# Patient Record
Sex: FEMALE | Race: WHITE | NOT HISPANIC OR LATINO | Employment: FULL TIME | URBAN - METROPOLITAN AREA
[De-identification: names, ages, dates, MRNs, and addresses within clinical notes are randomized per-mention and may not be internally consistent; named-entity substitution may affect disease eponyms.]

---

## 2018-06-06 ENCOUNTER — APPOINTMENT (EMERGENCY)
Dept: RADIOLOGY | Facility: HOSPITAL | Age: 63
End: 2018-06-06
Payer: COMMERCIAL

## 2018-06-06 ENCOUNTER — HOSPITAL ENCOUNTER (EMERGENCY)
Facility: HOSPITAL | Age: 63
Discharge: HOME | End: 2018-06-06
Attending: EMERGENCY MEDICINE | Admitting: EMERGENCY MEDICINE
Payer: COMMERCIAL

## 2018-06-06 VITALS
DIASTOLIC BLOOD PRESSURE: 65 MMHG | OXYGEN SATURATION: 97 % | SYSTOLIC BLOOD PRESSURE: 155 MMHG | RESPIRATION RATE: 16 BRPM | TEMPERATURE: 97.9 F | HEART RATE: 66 BPM

## 2018-06-06 DIAGNOSIS — S80.211A ABRASION OF RIGHT KNEE: ICD-10-CM

## 2018-06-06 DIAGNOSIS — M25.522 LEFT ELBOW PAIN: Primary | ICD-10-CM

## 2018-06-06 DIAGNOSIS — S60.511A: ICD-10-CM

## 2018-06-06 PROCEDURE — 73080 X-RAY EXAM OF ELBOW: CPT

## 2018-06-06 PROCEDURE — 99283 EMERGENCY DEPT VISIT LOW MDM: CPT

## 2018-06-06 PROCEDURE — 73090 X-RAY EXAM OF FOREARM: CPT

## 2018-06-06 RX ORDER — SIMVASTATIN 20 MG
20 TABLET ORAL
COMMUNITY

## 2018-06-06 RX ORDER — HYDROCHLOROTHIAZIDE 12.5 MG/1
12.5 TABLET ORAL DAILY
COMMUNITY

## 2018-06-06 RX ORDER — LISINOPRIL 20 MG/1
20 TABLET ORAL DAILY
COMMUNITY

## 2018-06-06 NOTE — ED PROVIDER NOTES
History  Chief Complaint   Patient presents with    Fall     pt fell on Saturday slipped on flip flops  pt noted bruise to left arm  no swelling  no thinners does take baby asa and fish oil  able to move arm      Pt was wearing flip flops and tripped  Broke fall with both hands  Scraped right hand and right knee  Having pain in left elbow since  Noticed a large bruise to her left elbow and was encouraged by coworkers to be evaluated  Pt has full ROM of left elbow  Pt believes last Tetanus shot was in past 5 years  History provided by:  Patient   used: No    Arm Pain   Location:  Left elbow/forearm  Duration:  4 days  Timing:  Constant  Progression:  Unchanged  Chronicity:  New  Worsened by: Movement  Associated symptoms: no abdominal pain, no chest pain, no headaches, no nausea, no shortness of breath and no vomiting        Prior to Admission Medications   Prescriptions Last Dose Informant Patient Reported? Taking? ASPIRIN 81 PO   Yes Yes   Sig: Take by mouth daily   Omega-3 Fatty Acids (FISH OIL PO)   Yes Yes   Sig: Take by mouth daily   hydrochlorothiazide (HYDRODIURIL) 12 5 mg tablet   Yes Yes   Sig: Take 12 5 mg by mouth daily   lisinopril (ZESTRIL) 20 mg tablet   Yes Yes   Sig: Take 20 mg by mouth daily   simvastatin (ZOCOR) 20 mg tablet   Yes Yes   Sig: Take 20 mg by mouth daily at bedtime      Facility-Administered Medications: None       Past Medical History:   Diagnosis Date    Hypertension        Past Surgical History:   Procedure Laterality Date    HYSTERECTOMY         History reviewed  No pertinent family history  I have reviewed and agree with the history as documented  Social History   Substance Use Topics    Smoking status: Never Smoker    Smokeless tobacco: Never Used    Alcohol use Yes      Comment: social         Review of Systems   HENT: Negative for ear discharge and facial swelling  Eyes: Negative for photophobia and pain     Respiratory: Negative for chest tightness, shortness of breath and stridor  Cardiovascular: Negative for chest pain  Gastrointestinal: Negative for abdominal distention, abdominal pain, nausea and vomiting  Musculoskeletal: Negative for back pain, joint swelling and neck pain  Left elbow/forearm     Skin: Positive for wound (Abrasions to right knee/hand)  Neurological: Negative for dizziness, facial asymmetry, speech difficulty, weakness, light-headedness, numbness and headaches  All other systems reviewed and are negative  Physical Exam  Physical Exam   Constitutional: She appears well-developed and well-nourished  No distress  HENT:   Head: Normocephalic and atraumatic  Eyes: No scleral icterus  Neck: No JVD present  Cardiovascular: Intact distal pulses  Pulmonary/Chest: Effort normal    Musculoskeletal: She exhibits no edema or deformity  Right elbow: Normal        Left elbow: She exhibits normal range of motion, no swelling, no effusion, no deformity and no laceration  Tenderness found  Lateral epicondyle tenderness noted  Right wrist: Normal         Left wrist: Normal         Right knee: Normal    Neurological: She is alert  She has normal strength  No cranial nerve deficit  Gait normal    Skin: Skin is warm and dry  Abrasion (right hand, right knee) and ecchymosis (Left elbow) noted  No rash noted  She is not diaphoretic  No erythema  No pallor  Psychiatric: She has a normal mood and affect  Pleasant   Nursing note and vitals reviewed        Vital Signs  ED Triage Vitals [06/06/18 1640]   Temperature Pulse Respirations Blood Pressure SpO2   97 9 °F (36 6 °C) 66 16 (!) 186/82 97 %      Temp Source Heart Rate Source Patient Position - Orthostatic VS BP Location FiO2 (%)   Oral Monitor Sitting Right arm --      Pain Score       No Pain           Vitals:    06/06/18 1640 06/06/18 1706   BP: (!) 186/82 155/65   Pulse: 66    Patient Position - Orthostatic VS: Sitting Sitting       Visual Acuity      ED Medications  Medications - No data to display    Diagnostic Studies  Results Reviewed     None                 XR forearm 2 views LEFT   ED Interpretation by Yoanna Lockett DO (06/06 1721)   No fracture      Final Result by Madiha Malhotra MD (06/06 1732)      No acute osseous abnormality  Workstation performed: [id number]         XR elbow 3+ vw LEFT   ED Interpretation by Yoanna Lockett DO (06/06 1725)   No fracture      Final Result by Madiha Malhotra MD (06/06 1731)      No acute osseous abnormality  Workstation performed: [id number]                    Procedures  Procedures       Phone Contacts  ED Phone Contact    ED Course                               MDM  Number of Diagnoses or Management Options  Diagnosis management comments: s/p mechanical fall - Will check xrays to r/o fx, pt reports she is UTD w/ Tetanus  Amount and/or Complexity of Data Reviewed  Tests in the radiology section of CPT®: reviewed and ordered      CritCare Time    Disposition  Final diagnoses:   Left elbow pain   Abrasion of right knee   Abrasion of palm of right hand     Time reflects when diagnosis was documented in both MDM as applicable and the Disposition within this note     Time User Action Codes Description Comment    6/6/2018  5:25 PM Laurie Taylor 46 [M25 522] Left elbow pain     6/6/2018  5:26 PM Brandon, [address] Abrasion of right knee     6/6/2018  5:26 PM Brandon, [address] Abrasion of palm of right hand       ED Disposition     ED Disposition Condition Comment    Discharge  Mariel Hines discharge to home/self care      Condition at discharge: Good        Follow-up Information     Follow up With Specialties Details Why Contact Info    Shantel Womack MD Orthopedic Surgery Schedule an appointment as soon as possible for a visit If symptoms persist [address] [place] [address]  [phone]            Patient's Medications Discharge Prescriptions    No medications on file     No discharge procedures on file      ED Provider  Electronically Signed by           Yoanna Luong 22, DO  06/06/18 7493

## 2018-06-06 NOTE — DISCHARGE INSTRUCTIONS
Abrasion   WHAT YOU NEED TO KNOW:   An abrasion is a scrape on your skin  It happens when your skin rubs against a rough surface  Some examples of an abrasion include rug burn, a skinned elbow, or road rash  Abrasions can be many shapes and sizes  The wound may hurt, bleed, bruise, or swell  DISCHARGE INSTRUCTIONS:   Return to the emergency department if:   · The bleeding does not stop after 10 minutes of firm pressure  · You cannot rinse one or more foreign objects out of your wound  · You have red streaks on your skin coming from your wound  Contact your healthcare provider if:   · You have a fever or chills  · Your abrasion is red, warm, swollen, or draining pus  · You have questions or concerns about your condition or care  Care for your abrasion:   · Wash your hands and dry them with a clean towel  · Press a clean cloth against your wound to stop any bleeding  · Rinse your wound with a lot of clean water  Do not use harsh soap, alcohol, or iodine solutions  · Use a clean, wet cloth to remove any objects, such as small pieces of rocks or dirt  · Rub antibiotic ointment on your wound  This may help prevent infection and help your wound heal     · Cover the wound with a non-stick bandage  Change the bandage daily, and if gets wet or dirty  Follow up with your healthcare provider as directed:  Write down your questions so you remember to ask them during your visits  © 2017 [address] [employer] Information is for End User's use only and may not be sold, redistributed or otherwise used for commercial purposes  All illustrations and images included in CareNotes® are the copyrighted property of [employer]  or Tahir Spivey  The above information is an  only  It is not intended as medical advice for individual conditions or treatments   Talk to your doctor, nurse or pharmacist before following any medical regimen to see if it is safe and effective for you  Elbow Sprain   WHAT YOU NEED TO KNOW:   An elbow sprain is caused by a stretched or torn ligament in the elbow joint  Ligaments are the strong tissues that connect bones  DISCHARGE INSTRUCTIONS:   Return to the emergency department if:   · The skin of your injured arm looks bluish or pale (less color than normal)  · You have new or increased numbness in your injured arm  Contact your healthcare provider if:   · You have increased swelling and pain in your elbow  · You have new or increased stiffness or trouble moving your injured arm  · You have questions or concerns about your condition or care  Medicines:   · Prescription pain medicine  may be given  Ask how to take this medicine safely  · Take your medicine as directed  Contact your healthcare provider if you think your medicine is not helping or if you have side effects  Tell him or her if you are allergic to any medicine  Keep a list of the medicines, vitamins, and herbs you take  Include the amounts, and when and why you take them  Bring the list or the pill bottles to follow-up visits  Carry your medicine list with you in case of an emergency  Rest your elbow: You will need to rest your elbow for 1 to 2 days after your injury  This will help decrease the risk of more damage to your elbow  Ice your elbow:  Apply ice on your elbow for 15 to 20 minutes every hour or as directed  Use an ice pack, or put crushed ice in a plastic bag  Cover it with a towel  Ice helps prevent tissue damage and decreases swelling and pain  Compress your elbow:  Compression provides support and helps decrease swelling and movement so your elbow can heal  You may be told to keep your elbow wrapped with a tight elastic bandage  Follow instructions about how to apply your bandage  Elevate your elbow:  Elevate your elbow above the level of your heart as often as you can  This will help decrease swelling and pain   Prop your elbow on pillows or blankets to keep it elevated comfortably  Exercise your elbow: You should begin to exercise your arm in a few days, once you are able to move your elbow without pain  Exercises will help decrease stiffness and improve the strength of your arm  Ask your healthcare provider what kind of exercises you should do  Prevent another elbow sprain:   · Make sure you warm up and stretch before you exercise  · Do not exercise when you feel pain or you are tired  · Wear equipment to protect yourself when you play sports  · Stop exercising and playing sports if your symptoms from a past injury return  Follow up with your healthcare provider within 1 week:  Write down any questions you have so you remember to ask them in your follow-up visits  © 2017 [address] [facility] Information is for End User's use only and may not be sold, redistributed or otherwise used for commercial purposes  All illustrations and images included in CareNotes® are the copyrighted property of A D [employer] M [employer]  or Tahir Spivey  The above information is an  only  It is not intended as medical advice for individual conditions or treatments  Talk to your doctor, nurse or pharmacist before following any medical regimen to see if it is safe and effective for you

## 2018-10-23 ENCOUNTER — APPOINTMENT (OUTPATIENT)
Dept: LAB | Facility: MEDICAL CENTER | Age: 63
End: 2018-10-23
Payer: COMMERCIAL

## 2018-10-23 ENCOUNTER — TRANSCRIBE ORDERS (OUTPATIENT)
Dept: ADMINISTRATIVE | Facility: HOSPITAL | Age: 63
End: 2018-10-23

## 2018-10-23 DIAGNOSIS — I51.9 MYXEDEMA HEART DISEASE: ICD-10-CM

## 2018-10-23 DIAGNOSIS — E03.9 MYXEDEMA HEART DISEASE: Primary | ICD-10-CM

## 2018-10-23 DIAGNOSIS — Z51.81 ENCOUNTER FOR THERAPEUTIC DRUG MONITORING: ICD-10-CM

## 2018-10-23 DIAGNOSIS — I10 BENIGN HYPERTENSION: ICD-10-CM

## 2018-10-23 DIAGNOSIS — E03.9 MYXEDEMA HEART DISEASE: ICD-10-CM

## 2018-10-23 DIAGNOSIS — E78.5 HYPERLIPIDEMIA, UNSPECIFIED HYPERLIPIDEMIA TYPE: ICD-10-CM

## 2018-10-23 DIAGNOSIS — I51.9 MYXEDEMA HEART DISEASE: Primary | ICD-10-CM

## 2018-10-23 LAB
ALBUMIN SERPL BCP-MCNC: 3.8 G/DL (ref 3.5–5)
ALP SERPL-CCNC: 63 U/L (ref 46–116)
ALT SERPL W P-5'-P-CCNC: 31 U/L (ref 12–78)
ANION GAP SERPL CALCULATED.3IONS-SCNC: 5 MMOL/L (ref 4–13)
AST SERPL W P-5'-P-CCNC: 18 U/L (ref 5–45)
BILIRUB SERPL-MCNC: 0.47 MG/DL (ref 0.2–1)
BUN SERPL-MCNC: 16 MG/DL (ref 5–25)
CALCIUM SERPL-MCNC: 9.9 MG/DL (ref 8.3–10.1)
CHLORIDE SERPL-SCNC: 104 MMOL/L (ref 100–108)
CHOLEST SERPL-MCNC: 168 MG/DL (ref 50–200)
CO2 SERPL-SCNC: 28 MMOL/L (ref 21–32)
CREAT SERPL-MCNC: 0.68 MG/DL (ref 0.6–1.3)
GFR SERPL CREATININE-BSD FRML MDRD: 92 ML/MIN/1.73SQ M
GLUCOSE P FAST SERPL-MCNC: 84 MG/DL (ref 65–99)
HDLC SERPL-MCNC: 46 MG/DL (ref 40–60)
LDLC SERPL CALC-MCNC: 85 MG/DL (ref 0–100)
NONHDLC SERPL-MCNC: 122 MG/DL
POTASSIUM SERPL-SCNC: 4.4 MMOL/L (ref 3.5–5.3)
PROT SERPL-MCNC: 7.2 G/DL (ref 6.4–8.2)
SODIUM SERPL-SCNC: 137 MMOL/L (ref 136–145)
TRIGL SERPL-MCNC: 185 MG/DL
TSH SERPL DL<=0.05 MIU/L-ACNC: 4.95 UIU/ML (ref 0.36–3.74)

## 2018-10-23 PROCEDURE — 80053 COMPREHEN METABOLIC PANEL: CPT

## 2018-10-23 PROCEDURE — 36415 COLL VENOUS BLD VENIPUNCTURE: CPT

## 2018-10-23 PROCEDURE — 80061 LIPID PANEL: CPT

## 2018-10-23 PROCEDURE — 84443 ASSAY THYROID STIM HORMONE: CPT

## 2019-02-15 ENCOUNTER — APPOINTMENT (OUTPATIENT)
Dept: LAB | Facility: MEDICAL CENTER | Age: 64
End: 2019-02-15
Payer: COMMERCIAL

## 2019-02-15 ENCOUNTER — TRANSCRIBE ORDERS (OUTPATIENT)
Dept: ADMINISTRATIVE | Facility: HOSPITAL | Age: 64
End: 2019-02-15

## 2019-02-15 DIAGNOSIS — Z51.81 ENCOUNTER FOR THERAPEUTIC DRUG MONITORING: ICD-10-CM

## 2019-02-15 DIAGNOSIS — E03.9 HYPOTHYROIDISM (ACQUIRED): ICD-10-CM

## 2019-02-15 DIAGNOSIS — E03.9 HYPOTHYROIDISM (ACQUIRED): Primary | ICD-10-CM

## 2019-02-15 LAB — TSH SERPL DL<=0.05 MIU/L-ACNC: 3.35 UIU/ML (ref 0.36–3.74)

## 2019-02-15 PROCEDURE — 36415 COLL VENOUS BLD VENIPUNCTURE: CPT

## 2019-02-15 PROCEDURE — 84443 ASSAY THYROID STIM HORMONE: CPT

## 2019-04-29 ENCOUNTER — APPOINTMENT (OUTPATIENT)
Dept: LAB | Facility: MEDICAL CENTER | Age: 64
End: 2019-04-29
Payer: COMMERCIAL

## 2019-04-29 ENCOUNTER — TRANSCRIBE ORDERS (OUTPATIENT)
Dept: ADMINISTRATIVE | Facility: HOSPITAL | Age: 64
End: 2019-04-29

## 2019-04-29 DIAGNOSIS — Z51.81 ENCOUNTER FOR MEDICATION MONITORING: ICD-10-CM

## 2019-04-29 DIAGNOSIS — E03.9 HYPOTHYROIDISM, ADULT: ICD-10-CM

## 2019-04-29 DIAGNOSIS — E03.9 HYPOTHYROIDISM, ADULT: Primary | ICD-10-CM

## 2019-04-29 LAB — TSH SERPL DL<=0.05 MIU/L-ACNC: 3.71 UIU/ML (ref 0.36–3.74)

## 2019-04-29 PROCEDURE — 84443 ASSAY THYROID STIM HORMONE: CPT

## 2019-04-29 PROCEDURE — 36415 COLL VENOUS BLD VENIPUNCTURE: CPT

## 2019-07-13 ENCOUNTER — HOSPITAL ENCOUNTER (EMERGENCY)
Facility: HOSPITAL | Age: 64
Discharge: HOME | End: 2019-07-13
Attending: EMERGENCY MEDICINE
Payer: COMMERCIAL

## 2019-07-13 ENCOUNTER — APPOINTMENT (EMERGENCY)
Dept: RADIOLOGY | Facility: HOSPITAL | Age: 64
End: 2019-07-13
Payer: COMMERCIAL

## 2019-07-13 ENCOUNTER — APPOINTMENT (EMERGENCY)
Dept: CT IMAGING | Facility: HOSPITAL | Age: 64
End: 2019-07-13
Payer: COMMERCIAL

## 2019-07-13 VITALS
DIASTOLIC BLOOD PRESSURE: 60 MMHG | WEIGHT: 203.93 LBS | SYSTOLIC BLOOD PRESSURE: 126 MMHG | RESPIRATION RATE: 16 BRPM | HEART RATE: 93 BPM | OXYGEN SATURATION: 100 % | TEMPERATURE: 98.1 F

## 2019-07-13 DIAGNOSIS — K21.9 GERD (GASTROESOPHAGEAL REFLUX DISEASE): ICD-10-CM

## 2019-07-13 DIAGNOSIS — E03.9 HYPOTHYROIDISM, UNSPECIFIED TYPE: ICD-10-CM

## 2019-07-13 DIAGNOSIS — Z51.81 ENCOUNTER FOR THERAPEUTIC DRUG MONITORING: ICD-10-CM

## 2019-07-13 DIAGNOSIS — R10.9 ABDOMINAL PAIN: Primary | ICD-10-CM

## 2019-07-13 DIAGNOSIS — K44.9 HIATAL HERNIA: ICD-10-CM

## 2019-07-13 DIAGNOSIS — R51.9 FACIAL PAIN: ICD-10-CM

## 2019-07-13 DIAGNOSIS — J32.9 CHRONIC SINUSITIS, UNSPECIFIED LOCATION: ICD-10-CM

## 2019-07-13 DIAGNOSIS — R09.81 NASAL CONGESTION: ICD-10-CM

## 2019-07-13 DIAGNOSIS — R05.9 COUGH: ICD-10-CM

## 2019-07-13 LAB
ALBUMIN SERPL BCP-MCNC: 3.6 G/DL (ref 3.5–5)
ALP SERPL-CCNC: 163 U/L (ref 46–116)
ALT SERPL W P-5'-P-CCNC: 43 U/L (ref 12–78)
ANION GAP SERPL CALCULATED.3IONS-SCNC: 9 MMOL/L (ref 4–13)
AST SERPL W P-5'-P-CCNC: 26 U/L (ref 5–45)
BACTERIA UR QL AUTO: ABNORMAL /HPF
BASOPHILS # BLD AUTO: 0.04 THOUSANDS/ΜL (ref 0–0.1)
BASOPHILS NFR BLD AUTO: 0 % (ref 0–1)
BILIRUB SERPL-MCNC: 0.41 MG/DL (ref 0.2–1)
BILIRUB UR QL STRIP: NEGATIVE
BUN SERPL-MCNC: 19 MG/DL (ref 5–25)
CALCIUM SERPL-MCNC: 9.6 MG/DL (ref 8.3–10.1)
CHLORIDE SERPL-SCNC: 100 MMOL/L (ref 100–108)
CLARITY UR: CLEAR
CO2 SERPL-SCNC: 27 MMOL/L (ref 21–32)
COLOR UR: YELLOW
CREAT SERPL-MCNC: 1.05 MG/DL (ref 0.6–1.3)
EOSINOPHIL # BLD AUTO: 1.97 THOUSAND/ΜL (ref 0–0.61)
EOSINOPHIL NFR BLD AUTO: 17 % (ref 0–6)
ERYTHROCYTE [DISTWIDTH] IN BLOOD BY AUTOMATED COUNT: 12.4 % (ref 11.6–15.1)
GFR SERPL CREATININE-BSD FRML MDRD: 55 ML/MIN/1.73SQ M
GLUCOSE SERPL-MCNC: 119 MG/DL (ref 65–140)
GLUCOSE UR STRIP-MCNC: NEGATIVE MG/DL
HCT VFR BLD AUTO: 45.2 % (ref 34.8–46.1)
HGB BLD-MCNC: 14.9 G/DL (ref 11.5–15.4)
HGB UR QL STRIP.AUTO: NEGATIVE
IMM GRANULOCYTES # BLD AUTO: 0.06 THOUSAND/UL (ref 0–0.2)
IMM GRANULOCYTES NFR BLD AUTO: 1 % (ref 0–2)
KETONES UR STRIP-MCNC: NEGATIVE MG/DL
LEUKOCYTE ESTERASE UR QL STRIP: ABNORMAL
LIPASE SERPL-CCNC: 113 U/L (ref 73–393)
LYMPHOCYTES # BLD AUTO: 0.31 THOUSANDS/ΜL (ref 0.6–4.47)
LYMPHOCYTES NFR BLD AUTO: 3 % (ref 14–44)
MCH RBC QN AUTO: 30.7 PG (ref 26.8–34.3)
MCHC RBC AUTO-ENTMCNC: 33 G/DL (ref 31.4–37.4)
MCV RBC AUTO: 93 FL (ref 82–98)
MONOCYTES # BLD AUTO: 0.79 THOUSAND/ΜL (ref 0.17–1.22)
MONOCYTES NFR BLD AUTO: 7 % (ref 4–12)
NEUTROPHILS # BLD AUTO: 8.51 THOUSANDS/ΜL (ref 1.85–7.62)
NEUTS SEG NFR BLD AUTO: 72 % (ref 43–75)
NITRITE UR QL STRIP: NEGATIVE
NON-SQ EPI CELLS URNS QL MICRO: ABNORMAL /HPF
NRBC BLD AUTO-RTO: 0 /100 WBCS
PH UR STRIP.AUTO: 5.5 [PH] (ref 4.5–8)
PLATELET # BLD AUTO: 254 THOUSANDS/UL (ref 149–390)
PMV BLD AUTO: 9.6 FL (ref 8.9–12.7)
POTASSIUM SERPL-SCNC: 4.2 MMOL/L (ref 3.5–5.3)
PROT SERPL-MCNC: 8 G/DL (ref 6.4–8.2)
PROT UR STRIP-MCNC: NEGATIVE MG/DL
RBC # BLD AUTO: 4.85 MILLION/UL (ref 3.81–5.12)
RBC #/AREA URNS AUTO: ABNORMAL /HPF
SODIUM SERPL-SCNC: 136 MMOL/L (ref 136–145)
SP GR UR STRIP.AUTO: <=1.005 (ref 1–1.03)
TROPONIN I SERPL-MCNC: <0.02 NG/ML
UROBILINOGEN UR QL STRIP.AUTO: 0.2 E.U./DL
WBC # BLD AUTO: 11.68 THOUSAND/UL (ref 4.31–10.16)
WBC #/AREA URNS AUTO: ABNORMAL /HPF

## 2019-07-13 PROCEDURE — 99284 EMERGENCY DEPT VISIT MOD MDM: CPT | Performed by: PHYSICIAN ASSISTANT

## 2019-07-13 PROCEDURE — 80053 COMPREHEN METABOLIC PANEL: CPT | Performed by: PHYSICIAN ASSISTANT

## 2019-07-13 PROCEDURE — 36415 COLL VENOUS BLD VENIPUNCTURE: CPT | Performed by: PHYSICIAN ASSISTANT

## 2019-07-13 PROCEDURE — 84484 ASSAY OF TROPONIN QUANT: CPT | Performed by: PHYSICIAN ASSISTANT

## 2019-07-13 PROCEDURE — 74177 CT ABD & PELVIS W/CONTRAST: CPT

## 2019-07-13 PROCEDURE — 83690 ASSAY OF LIPASE: CPT | Performed by: PHYSICIAN ASSISTANT

## 2019-07-13 PROCEDURE — 96361 HYDRATE IV INFUSION ADD-ON: CPT

## 2019-07-13 PROCEDURE — 71046 X-RAY EXAM CHEST 2 VIEWS: CPT

## 2019-07-13 PROCEDURE — 81001 URINALYSIS AUTO W/SCOPE: CPT

## 2019-07-13 PROCEDURE — 96360 HYDRATION IV INFUSION INIT: CPT

## 2019-07-13 PROCEDURE — 85025 COMPLETE CBC W/AUTO DIFF WBC: CPT | Performed by: PHYSICIAN ASSISTANT

## 2019-07-13 PROCEDURE — 99285 EMERGENCY DEPT VISIT HI MDM: CPT

## 2019-07-13 PROCEDURE — 93005 ELECTROCARDIOGRAM TRACING: CPT

## 2019-07-13 RX ORDER — OMEPRAZOLE 40 MG/1
40 CAPSULE, DELAYED RELEASE ORAL DAILY
Qty: 20 CAPSULE | Refills: 0 | Status: SHIPPED | OUTPATIENT
Start: 2019-07-13

## 2019-07-13 RX ORDER — LEVOTHYROXINE SODIUM 0.03 MG/1
25 TABLET ORAL DAILY
COMMUNITY

## 2019-07-13 RX ORDER — ONDANSETRON 4 MG/1
4 TABLET, ORALLY DISINTEGRATING ORAL EVERY 8 HOURS PRN
Qty: 10 TABLET | Refills: 0 | Status: SHIPPED | OUTPATIENT
Start: 2019-07-13 | End: 2019-09-25

## 2019-07-13 RX ORDER — ONDANSETRON 2 MG/ML
4 INJECTION INTRAMUSCULAR; INTRAVENOUS ONCE
Status: DISCONTINUED | OUTPATIENT
Start: 2019-07-13 | End: 2019-07-13 | Stop reason: HOSPADM

## 2019-07-13 RX ADMIN — SODIUM CHLORIDE 1000 ML: 0.9 INJECTION, SOLUTION INTRAVENOUS at 15:17

## 2019-07-13 RX ADMIN — IOHEXOL 100 ML: 350 INJECTION, SOLUTION INTRAVENOUS at 15:55

## 2019-07-13 NOTE — ED PROVIDER NOTES
History  Chief Complaint   Patient presents with    Abdominal Pain     abdomen pain and vomiting     75 y o  Female who presents for evaluation of N/V and waking up sweating since approximately 03:00, notes occasional wheeze  vague generalized abdominal discomfort worse LUQ  Notes similar episode approximately 1 week ago self limitng  Pt notes mild nausea at presents, abdominal discomfort;  Denies fevers, chills, SOB, CP, HA, blurry vision, no known sick contacts  Notes she has had post nasal discharge, congestion and nasal sounding voice x several weeks was treated with levaquin for sinusitis several weeks ago  + former smoker, + maternal CAD history  Prior to Admission Medications   Prescriptions Last Dose Informant Patient Reported? Taking? ASPIRIN 81 PO   Yes Yes   Sig: Take by mouth daily   Omega-3 Fatty Acids (FISH OIL PO)   Yes Yes   Sig: Take by mouth daily   hydrochlorothiazide (HYDRODIURIL) 12 5 mg tablet   Yes Yes   Sig: Take 12 5 mg by mouth daily   levothyroxine 25 mcg tablet   Yes Yes   Sig: Take 25 mcg by mouth daily   lisinopril (ZESTRIL) 20 mg tablet   Yes Yes   Sig: Take 20 mg by mouth daily   simvastatin (ZOCOR) 20 mg tablet   Yes Yes   Sig: Take 20 mg by mouth daily at bedtime      Facility-Administered Medications: None       Past Medical History:   Diagnosis Date    Hypertension        Past Surgical History:   Procedure Laterality Date    HYSTERECTOMY         History reviewed  No pertinent family history  I have reviewed and agree with the history as documented  Social History     Tobacco Use    Smoking status: Former Smoker    Smokeless tobacco: Never Used   Substance Use Topics    Alcohol use: Yes     Comment: social     Drug use: No        Review of Systems   Constitutional: Positive for diaphoresis  HENT: Positive for postnasal drip and rhinorrhea  Respiratory: Positive for wheezing  Gastrointestinal: Positive for nausea and vomiting     All other systems reviewed and are negative  Physical Exam  Physical Exam   Constitutional: She is oriented to person, place, and time  She appears well-developed  Non-toxic appearance  She does not appear ill  Eyes: Pupils are equal, round, and reactive to light  EOM are normal    Cardiovascular: Normal rate and normal heart sounds  Pulmonary/Chest: Effort normal and breath sounds normal    Abdominal: Soft  Normal appearance and bowel sounds are normal  There is tenderness in the left upper quadrant  There is no rigidity, no rebound, no guarding, no CVA tenderness, no tenderness at McBurney's point and negative Saldaña's sign  Neurological: She is alert and oriented to person, place, and time  Skin: Skin is warm and dry  Capillary refill takes less than 2 seconds  Psychiatric: She has a normal mood and affect  Her behavior is normal    Nursing note and vitals reviewed        Vital Signs  ED Triage Vitals   Temperature Pulse Respirations Blood Pressure SpO2   07/13/19 1418 07/13/19 1418 07/13/19 1418 07/13/19 1418 07/13/19 1418   98 1 °F (36 7 °C) (!) 108 16 151/75 98 %      Temp Source Heart Rate Source Patient Position - Orthostatic VS BP Location FiO2 (%)   07/13/19 1418 07/13/19 1418 07/13/19 1418 07/13/19 1418 --   Temporal Monitor Sitting Right arm       Pain Score       07/13/19 1501       No Pain           Vitals:    07/13/19 1418 07/13/19 1501   BP: 151/75 122/57   Pulse: (!) 108 90   Patient Position - Orthostatic VS: Sitting Sitting         Visual Acuity      ED Medications  Medications   sodium chloride 0 9 % bolus 1,000 mL (1,000 mL Intravenous New Bag 7/13/19 1517)   ondansetron (ZOFRAN) injection 4 mg (4 mg Intravenous Not Given 7/13/19 1520)   iohexol (OMNIPAQUE) 350 MG/ML injection (MULTI-DOSE) 100 mL (100 mL Intravenous Given 7/13/19 1555)       Diagnostic Studies  Results Reviewed     Procedure Component Value Units Date/Time    Comprehensive metabolic panel [[id number]]  (Abnormal) Collected:  07/13/19 1517    Lab Status:  Final result Specimen:  Blood from Arm, Right Updated:  07/13/19 1544     Sodium 136 mmol/L      Potassium 4 2 mmol/L      Chloride 100 mmol/L      CO2 27 mmol/L      ANION GAP 9 mmol/L      BUN 19 mg/dL      Creatinine 1 05 mg/dL      Glucose 119 mg/dL      Calcium 9 6 mg/dL      AST 26 U/L      ALT 43 U/L      Alkaline Phosphatase 163 U/L      Total Protein 8 0 g/dL      Albumin 3 6 g/dL      Total Bilirubin 0 41 mg/dL      eGFR 55 ml/min/1 73sq m     Narrative:       National Kidney Disease Foundation guidelines for Chronic Kidney Disease (CKD):     Stage 1 with normal or high GFR (GFR > 90 mL/min/1 73 square meters)    Stage 2 Mild CKD (GFR = 60-89 mL/min/1 73 square meters)    Stage 3A Moderate CKD (GFR = 45-59 mL/min/1 73 square meters)    Stage 3B Moderate CKD (GFR = 30-44 mL/min/1 73 square meters)    Stage 4 Severe CKD (GFR = 15-29 mL/min/1 73 square meters)    Stage 5 End Stage CKD (GFR <15 mL/min/1 73 square meters)  Note: GFR calculation is accurate only with a steady state creatinine    Lipase [[id number]]  (Normal) Collected:  07/13/19 1517    Lab Status:  Final result Specimen:  Blood from Arm, Right Updated:  07/13/19 1544     Lipase 113 u/L     Troponin I [[id number]]  (Normal) Collected:  07/13/19 1517    Lab Status:  Final result Specimen:  Blood from Arm, Right Updated:  07/13/19 1542     Troponin I <0 02 ng/mL     CBC and differential [[id number]]  (Abnormal) Collected:  07/13/19 1517    Lab Status:  Final result Specimen:  Blood from Arm, Right Updated:  07/13/19 1523     WBC 11 68 Thousand/uL      RBC 4 85 Million/uL      Hemoglobin 14 9 g/dL      Hematocrit 45 2 %      MCV 93 fL      MCH 30 7 pg      MCHC 33 0 g/dL      RDW 12 4 %      MPV 9 6 fL      Platelets 545 Thousands/uL      nRBC 0 /100 WBCs      Neutrophils Relative 72 %      Immat GRANS % 1 %      Lymphocytes Relative 3 %      Monocytes Relative 7 %      Eosinophils Relative 17 %      Basophils Relative 0 % Neutrophils Absolute 8 51 Thousands/µL      Immature Grans Absolute 0 06 Thousand/uL      Lymphocytes Absolute 0 31 Thousands/µL      Monocytes Absolute 0 79 Thousand/µL      Eosinophils Absolute 1 97 Thousand/µL      Basophils Absolute 0 04 Thousands/µL     POCT urinalysis dipstick [[id number]]     Lab Status:  No result Specimen:  Urine                  XR chest 2 views   Final Result by Buzz Ramon MD (07/13 4116)      No acute cardiopulmonary disease  Workstation performed: [id number]         CT abdomen pelvis with contrast    (Results Pending)              Procedures  Procedures       ED Course                               MDM  Number of Diagnoses or Management Options  Diagnosis management comments: Will check CBC, CMP, IVF, zofran, troponin, EKG, CXR and CT abdomen and pelvis  CXR no acute findings, CBC mild elevated WBCs, troponin negative, EKG no acute MI,   Pt was signed out to Dr Anuradha Perkins for further evaluation and management CT results pending      Disposition  Final diagnoses:   None     ED Disposition     None      Follow-up Information    None         Patient's Medications   Discharge Prescriptions    No medications on file     No discharge procedures on file      ED Provider  Electronically Signed by           Helene Orozco PA-C  07/13/19 2605

## 2019-07-13 NOTE — ED CARE HANDOFF
Emergency Department Sign Out Note        Sign out and transfer of care from [facility]  See Separate Emergency Department note  The patient, Galen Nieto, was evaluated by the previous provider for Abdominal pain  Workup Completed:  Labs, urine, CT A/P     ED Course / Workup Pending (followup):  Please see note below  ED Course as of Jul 13 1714   Sat Jul 13, 2019   1650 Patient received in sign out for follow up of CT results, no acute abnormality, incidental findings discussed with patient; advised follow up with PCP for further evaluation  We will give Omeprazole and Zofran for possible GERD symptoms  Procedures  MDM    Disposition  Final diagnoses:   Abdominal pain   GERD (gastroesophageal reflux disease)   Hiatal hernia     Time reflects when diagnosis was documented in both MDM as applicable and the Disposition within this note     Time User Action Codes Description Comment    7/13/2019  4:54 PM Nai Juarez [R10 9] Abdominal pain     7/13/2019  4:54 PM Nai Juarez [K21 9] GERD (gastroesophageal reflux disease)     7/13/2019  4:55 PM Nai Juarez [K44 9] Hiatal hernia       ED Disposition     ED Disposition Condition Date/Time Comment    Discharge Stable Sat Jul 13, 2019  4:54 PM Jesica Hines discharge to home/self care  Follow-up Information     Follow up With Specialties Details Why Contact Slim Vazquez MD Family Medicine   [address]  [phone]          Discharge Medication List as of 7/13/2019  4:56 PM      START taking these medications    Details   omeprazole (PriLOSEC) 40 MG capsule Take 1 capsule (40 mg total) by mouth daily, Starting Sat 7/13/2019, Print      ondansetron (ZOFRAN-ODT) 4 mg disintegrating tablet Take 1 tablet (4 mg total) by mouth every 8 (eight) hours as needed for nausea or vomiting for up to 5 days, Starting Sat 7/13/2019, Until u 7/18/2019, Print         CONTINUE these medications which have NOT CHANGED    Details   ASPIRIN 81 PO Take by mouth daily, Historical Med      hydrochlorothiazide (HYDRODIURIL) 12 5 mg tablet Take 12 5 mg by mouth daily, Historical Med      levothyroxine 25 mcg tablet Take 25 mcg by mouth daily, Historical Med      lisinopril (ZESTRIL) 20 mg tablet Take 20 mg by mouth daily, Historical Med      Omega-3 Fatty Acids (FISH OIL PO) Take by mouth daily, Historical Med      simvastatin (ZOCOR) 20 mg tablet Take 20 mg by mouth daily at bedtime, Historical Med           No discharge procedures on file         ED Provider  Electronically Signed by     Austin Kyle MD  07/13/19 7004

## 2019-07-13 NOTE — ED NOTES
Patient transported to [facility]  07/13/19 6944

## 2019-07-15 LAB
ATRIAL RATE: 85 BPM
P AXIS: -42 DEGREES
PR INTERVAL: 96 MS
QRS AXIS: 62 DEGREES
QRSD INTERVAL: 76 MS
QT INTERVAL: 338 MS
QTC INTERVAL: 402 MS
T WAVE AXIS: 33 DEGREES
VENTRICULAR RATE: 85 BPM

## 2019-07-15 PROCEDURE — 93010 ELECTROCARDIOGRAM REPORT: CPT | Performed by: INTERNAL MEDICINE

## 2019-07-22 ENCOUNTER — TRANSCRIBE ORDERS (OUTPATIENT)
Dept: ADMINISTRATIVE | Facility: HOSPITAL | Age: 64
End: 2019-07-22

## 2019-07-22 DIAGNOSIS — R51.9 FACIAL PAIN: Primary | ICD-10-CM

## 2019-07-22 DIAGNOSIS — E03.9 HYPOTHYROIDISM, UNSPECIFIED TYPE: ICD-10-CM

## 2019-07-22 DIAGNOSIS — Z51.81 ENCOUNTER FOR THERAPEUTIC DRUG MONITORING: ICD-10-CM

## 2019-07-22 DIAGNOSIS — J32.9 CHRONIC SINUSITIS, UNSPECIFIED LOCATION: ICD-10-CM

## 2019-07-22 DIAGNOSIS — R05.9 COUGH: ICD-10-CM

## 2019-07-31 ENCOUNTER — APPOINTMENT (OUTPATIENT)
Dept: LAB | Facility: HOSPITAL | Age: 64
End: 2019-07-31
Payer: COMMERCIAL

## 2019-07-31 DIAGNOSIS — R09.81 NASAL CONGESTION: ICD-10-CM

## 2019-07-31 DIAGNOSIS — E03.9 HYPOTHYROIDISM, UNSPECIFIED TYPE: ICD-10-CM

## 2019-07-31 DIAGNOSIS — Z51.81 ENCOUNTER FOR THERAPEUTIC DRUG MONITORING: ICD-10-CM

## 2019-07-31 DIAGNOSIS — R51.9 FACIAL PAIN: ICD-10-CM

## 2019-07-31 DIAGNOSIS — J32.9 CHRONIC SINUSITIS, UNSPECIFIED LOCATION: ICD-10-CM

## 2019-07-31 DIAGNOSIS — R05.9 COUGH: ICD-10-CM

## 2019-07-31 LAB — TSH SERPL DL<=0.05 MIU/L-ACNC: 4.27 UIU/ML (ref 0.36–3.74)

## 2019-07-31 PROCEDURE — 36415 COLL VENOUS BLD VENIPUNCTURE: CPT

## 2019-07-31 PROCEDURE — 86003 ALLG SPEC IGE CRUDE XTRC EA: CPT

## 2019-07-31 PROCEDURE — 84443 ASSAY THYROID STIM HORMONE: CPT

## 2019-07-31 PROCEDURE — 82785 ASSAY OF IGE: CPT

## 2019-08-05 ENCOUNTER — TRANSCRIBE ORDERS (OUTPATIENT)
Dept: LAB | Facility: HOSPITAL | Age: 64
End: 2019-08-05

## 2019-08-05 DIAGNOSIS — R51.9 FACIAL PAIN: ICD-10-CM

## 2019-08-05 DIAGNOSIS — R05.9 COUGH: ICD-10-CM

## 2019-08-05 DIAGNOSIS — Z51.81 ENCOUNTER FOR THERAPEUTIC DRUG MONITORING: ICD-10-CM

## 2019-08-05 DIAGNOSIS — J32.9 CHRONIC SINUSITIS, UNSPECIFIED LOCATION: ICD-10-CM

## 2019-08-05 DIAGNOSIS — R09.81 NASAL CONGESTION: Primary | ICD-10-CM

## 2019-08-05 DIAGNOSIS — E03.9 HYPOTHYROIDISM, UNSPECIFIED TYPE: ICD-10-CM

## 2019-08-05 LAB
ALLERGEN COMMENT: ABNORMAL
ALMOND IGE QN: 0.24 KUA/I
CASHEW NUT IGE QN: 0.2 KUA/I
CODFISH IGE QN: <0.1 KUA/I
EGG WHITE IGE QN: 1.41 KUA/I
GLUTEN IGE QN: 0.39 KUA/I
HAZELNUT IGE QN: 0.14 KUA/L
MILK IGE QN: 0.66 KUA/I
PEANUT IGE QN: 0.23 KUA/I
SALMON IGE QN: 0.11 KUA/I
SCALLOP IGE QN: 0.22 KUA/L
SESAME SEED IGE QN: 0.24 KUA/I
SHRIMP IGE QN: 0.2 KUA/L
SOYBEAN IGE QN: 0.27 KUA/I
TOTAL IGE SMQN RAST: >5000 KU/L (ref 0–113)
TUNA IGE QN: <0.1 KUA/I
WALNUT IGE QN: 0.18 KUA/I
WHEAT IGE QN: 0.4 KUA/I

## 2019-08-06 ENCOUNTER — APPOINTMENT (OUTPATIENT)
Dept: LAB | Facility: HOSPITAL | Age: 64
End: 2019-08-06
Payer: COMMERCIAL

## 2019-08-06 DIAGNOSIS — R51.9 FACIAL PAIN: ICD-10-CM

## 2019-08-06 DIAGNOSIS — E03.9 HYPOTHYROIDISM, UNSPECIFIED TYPE: ICD-10-CM

## 2019-08-06 DIAGNOSIS — Z51.81 ENCOUNTER FOR THERAPEUTIC DRUG MONITORING: ICD-10-CM

## 2019-08-06 DIAGNOSIS — R05.9 COUGH: ICD-10-CM

## 2019-08-06 DIAGNOSIS — R09.81 NASAL CONGESTION: ICD-10-CM

## 2019-08-06 DIAGNOSIS — J32.9 CHRONIC SINUSITIS, UNSPECIFIED LOCATION: ICD-10-CM

## 2019-08-06 PROCEDURE — 36415 COLL VENOUS BLD VENIPUNCTURE: CPT

## 2019-08-06 PROCEDURE — 86008 ALLG SPEC IGE RECOMB EA: CPT

## 2019-08-08 LAB
A-LACTALB IGE QN: 0.46 KAU/I
B-LACTOGLOB IGE QN: 0.3 KAU/I
CASEIN IGE QN: 0.57 KAU/I
OVALB IGE QN: 0.34 KAU/I
OVOMUCOID IGE QN: 0.7 KAU/I
PEANUT (RARA H) 1 IGE QN: 0.21 KUA/I
PEANUT (RARA H) 2 IGE QN: 0.23 KUA/I
PEANUT (RARA H) 3 IGE QN: 0.24 KUA/I
PEANUT (RARA H) 8 IGE QN: 0.22 KUA/I
PEANUT (RARA H) 9 IGE QN: 0.26 KUA/I

## 2019-11-11 ENCOUNTER — TRANSCRIBE ORDERS (OUTPATIENT)
Dept: ADMINISTRATIVE | Facility: HOSPITAL | Age: 64
End: 2019-11-11

## 2019-11-11 ENCOUNTER — APPOINTMENT (OUTPATIENT)
Dept: LAB | Facility: MEDICAL CENTER | Age: 64
End: 2019-11-11
Payer: COMMERCIAL

## 2019-11-11 DIAGNOSIS — Z51.81 ENCOUNTER FOR THERAPEUTIC DRUG MONITORING: ICD-10-CM

## 2019-11-11 DIAGNOSIS — E78.5 HYPERLIPIDEMIA, UNSPECIFIED HYPERLIPIDEMIA TYPE: ICD-10-CM

## 2019-11-11 DIAGNOSIS — I10 ESSENTIAL HYPERTENSION, MALIGNANT: ICD-10-CM

## 2019-11-11 DIAGNOSIS — R94.5 ABNORMAL RESULTS OF LIVER FUNCTION STUDIES: ICD-10-CM

## 2019-11-11 DIAGNOSIS — E03.9 HYPOTHYROIDISM, ADULT: ICD-10-CM

## 2019-11-11 DIAGNOSIS — R94.5 ABNORMAL RESULTS OF LIVER FUNCTION STUDIES: Primary | ICD-10-CM

## 2019-11-11 DIAGNOSIS — E55.9 VITAMIN D DEFICIENCY: ICD-10-CM

## 2019-11-11 LAB
ALBUMIN SERPL BCP-MCNC: 4.3 G/DL (ref 3.5–5)
ANION GAP SERPL CALCULATED.3IONS-SCNC: 6 MMOL/L (ref 4–13)
BASOPHILS # BLD AUTO: 0.04 THOUSANDS/ΜL (ref 0–0.1)
BASOPHILS NFR BLD AUTO: 1 % (ref 0–1)
BUN SERPL-MCNC: 18 MG/DL (ref 5–25)
CALCIUM ALBUM COR SERPL-MCNC: 10.5 MG/DL (ref 8.3–10.1)
CALCIUM SERPL-MCNC: 10.7 MG/DL (ref 8.3–10.1)
CALCIUM SERPL-MCNC: 10.7 MG/DL (ref 8.3–10.1)
CHLORIDE SERPL-SCNC: 106 MMOL/L (ref 100–108)
CHOLEST SERPL-MCNC: 207 MG/DL (ref 50–200)
CO2 SERPL-SCNC: 28 MMOL/L (ref 21–32)
CREAT SERPL-MCNC: 0.77 MG/DL (ref 0.6–1.3)
EOSINOPHIL # BLD AUTO: 0.12 THOUSAND/ΜL (ref 0–0.61)
EOSINOPHIL NFR BLD AUTO: 2 % (ref 0–6)
ERYTHROCYTE [DISTWIDTH] IN BLOOD BY AUTOMATED COUNT: 12.3 % (ref 11.6–15.1)
GFR SERPL CREATININE-BSD FRML MDRD: 81 ML/MIN/1.73SQ M
GLUCOSE P FAST SERPL-MCNC: 101 MG/DL (ref 65–99)
HCT VFR BLD AUTO: 44.2 % (ref 34.8–46.1)
HDLC SERPL-MCNC: 42 MG/DL
HGB BLD-MCNC: 14 G/DL (ref 11.5–15.4)
IMM GRANULOCYTES # BLD AUTO: 0.02 THOUSAND/UL (ref 0–0.2)
IMM GRANULOCYTES NFR BLD AUTO: 0 % (ref 0–2)
LDLC SERPL CALC-MCNC: 113 MG/DL (ref 0–100)
LYMPHOCYTES # BLD AUTO: 1.78 THOUSANDS/ΜL (ref 0.6–4.47)
LYMPHOCYTES NFR BLD AUTO: 25 % (ref 14–44)
MCH RBC QN AUTO: 29.4 PG (ref 26.8–34.3)
MCHC RBC AUTO-ENTMCNC: 31.7 G/DL (ref 31.4–37.4)
MCV RBC AUTO: 93 FL (ref 82–98)
MONOCYTES # BLD AUTO: 0.57 THOUSAND/ΜL (ref 0.17–1.22)
MONOCYTES NFR BLD AUTO: 8 % (ref 4–12)
NEUTROPHILS # BLD AUTO: 4.63 THOUSANDS/ΜL (ref 1.85–7.62)
NEUTS SEG NFR BLD AUTO: 64 % (ref 43–75)
NONHDLC SERPL-MCNC: 165 MG/DL
NRBC BLD AUTO-RTO: 0 /100 WBCS
PLATELET # BLD AUTO: 267 THOUSANDS/UL (ref 149–390)
PMV BLD AUTO: 10.3 FL (ref 8.9–12.7)
POTASSIUM SERPL-SCNC: 4.6 MMOL/L (ref 3.5–5.3)
RBC # BLD AUTO: 4.76 MILLION/UL (ref 3.81–5.12)
SODIUM SERPL-SCNC: 140 MMOL/L (ref 136–145)
TRIGL SERPL-MCNC: 261 MG/DL
TSH SERPL DL<=0.05 MIU/L-ACNC: 1.39 UIU/ML (ref 0.36–3.74)
WBC # BLD AUTO: 7.16 THOUSAND/UL (ref 4.31–10.16)

## 2019-11-11 PROCEDURE — 80061 LIPID PANEL: CPT

## 2019-11-11 PROCEDURE — 82040 ASSAY OF SERUM ALBUMIN: CPT

## 2019-11-11 PROCEDURE — 36415 COLL VENOUS BLD VENIPUNCTURE: CPT

## 2019-11-11 PROCEDURE — 85025 COMPLETE CBC W/AUTO DIFF WBC: CPT

## 2019-11-11 PROCEDURE — 84443 ASSAY THYROID STIM HORMONE: CPT

## 2019-11-11 PROCEDURE — 82652 VIT D 1 25-DIHYDROXY: CPT

## 2019-11-11 PROCEDURE — 80048 BASIC METABOLIC PNL TOTAL CA: CPT

## 2019-11-13 LAB — 1,25(OH)2D3 SERPL-MCNC: 31.9 PG/ML (ref 19.9–79.3)

## 2020-05-15 ENCOUNTER — TRANSCRIBE ORDERS (OUTPATIENT)
Dept: LAB | Facility: CLINIC | Age: 65
End: 2020-05-15

## 2020-05-15 ENCOUNTER — APPOINTMENT (OUTPATIENT)
Dept: LAB | Facility: CLINIC | Age: 65
End: 2020-05-15
Payer: COMMERCIAL

## 2020-05-15 DIAGNOSIS — Z51.81 ENCOUNTER FOR THERAPEUTIC DRUG MONITORING: ICD-10-CM

## 2020-05-15 DIAGNOSIS — D50.8 OTHER IRON DEFICIENCY ANEMIA: ICD-10-CM

## 2020-05-15 DIAGNOSIS — R94.5 NONSPECIFIC ABNORMAL RESULTS OF LIVER FUNCTION STUDY: ICD-10-CM

## 2020-05-15 DIAGNOSIS — E03.9 MYXEDEMA HEART DISEASE: ICD-10-CM

## 2020-05-15 DIAGNOSIS — D50.8 OTHER IRON DEFICIENCY ANEMIA: Primary | ICD-10-CM

## 2020-05-15 DIAGNOSIS — I51.9 MYXEDEMA HEART DISEASE: ICD-10-CM

## 2020-05-15 DIAGNOSIS — I10 ESSENTIAL HYPERTENSION, MALIGNANT: ICD-10-CM

## 2020-05-15 DIAGNOSIS — E55.9 AVITAMINOSIS D: ICD-10-CM

## 2020-05-15 DIAGNOSIS — E78.2 MIXED HYPERLIPIDEMIA: ICD-10-CM

## 2020-05-15 LAB
25(OH)D3 SERPL-MCNC: 26 NG/ML (ref 30–100)
ALBUMIN SERPL BCP-MCNC: 4.1 G/DL (ref 3.5–5)
ALP SERPL-CCNC: 75 U/L (ref 46–116)
ALT SERPL W P-5'-P-CCNC: 31 U/L (ref 12–78)
ANION GAP SERPL CALCULATED.3IONS-SCNC: 5 MMOL/L (ref 4–13)
AST SERPL W P-5'-P-CCNC: 19 U/L (ref 5–45)
BASOPHILS # BLD AUTO: 0.04 THOUSANDS/ΜL (ref 0–0.1)
BASOPHILS NFR BLD AUTO: 1 % (ref 0–1)
BILIRUB SERPL-MCNC: 0.44 MG/DL (ref 0.2–1)
BUN SERPL-MCNC: 20 MG/DL (ref 5–25)
CALCIUM ALBUM COR SERPL-MCNC: 10.2 MG/DL (ref 8.3–10.1)
CALCIUM SERPL-MCNC: 10.3 MG/DL (ref 8.3–10.1)
CHLORIDE SERPL-SCNC: 106 MMOL/L (ref 100–108)
CHOLEST SERPL-MCNC: 190 MG/DL (ref 50–200)
CO2 SERPL-SCNC: 28 MMOL/L (ref 21–32)
CREAT SERPL-MCNC: 0.72 MG/DL (ref 0.6–1.3)
EOSINOPHIL # BLD AUTO: 0.03 THOUSAND/ΜL (ref 0–0.61)
EOSINOPHIL NFR BLD AUTO: 1 % (ref 0–6)
ERYTHROCYTE [DISTWIDTH] IN BLOOD BY AUTOMATED COUNT: 12.7 % (ref 11.6–15.1)
GFR SERPL CREATININE-BSD FRML MDRD: 87 ML/MIN/1.73SQ M
GLUCOSE P FAST SERPL-MCNC: 90 MG/DL (ref 65–99)
HCT VFR BLD AUTO: 40.1 % (ref 34.8–46.1)
HDLC SERPL-MCNC: 47 MG/DL
HGB BLD-MCNC: 12.9 G/DL (ref 11.5–15.4)
IMM GRANULOCYTES # BLD AUTO: 0.01 THOUSAND/UL (ref 0–0.2)
IMM GRANULOCYTES NFR BLD AUTO: 0 % (ref 0–2)
LDLC SERPL CALC-MCNC: 102 MG/DL (ref 0–100)
LYMPHOCYTES # BLD AUTO: 1.94 THOUSANDS/ΜL (ref 0.6–4.47)
LYMPHOCYTES NFR BLD AUTO: 35 % (ref 14–44)
MCH RBC QN AUTO: 29.9 PG (ref 26.8–34.3)
MCHC RBC AUTO-ENTMCNC: 32.2 G/DL (ref 31.4–37.4)
MCV RBC AUTO: 93 FL (ref 82–98)
MONOCYTES # BLD AUTO: 0.53 THOUSAND/ΜL (ref 0.17–1.22)
MONOCYTES NFR BLD AUTO: 10 % (ref 4–12)
NEUTROPHILS # BLD AUTO: 3.03 THOUSANDS/ΜL (ref 1.85–7.62)
NEUTS SEG NFR BLD AUTO: 53 % (ref 43–75)
NONHDLC SERPL-MCNC: 143 MG/DL
NRBC BLD AUTO-RTO: 0 /100 WBCS
PLATELET # BLD AUTO: 239 THOUSANDS/UL (ref 149–390)
PMV BLD AUTO: 10.6 FL (ref 8.9–12.7)
POTASSIUM SERPL-SCNC: 4.2 MMOL/L (ref 3.5–5.3)
PROT SERPL-MCNC: 7.3 G/DL (ref 6.4–8.2)
PTH-INTACT SERPL-MCNC: 81.6 PG/ML (ref 18.4–80.1)
RBC # BLD AUTO: 4.32 MILLION/UL (ref 3.81–5.12)
SODIUM SERPL-SCNC: 139 MMOL/L (ref 136–145)
TRIGL SERPL-MCNC: 203 MG/DL
TSH SERPL DL<=0.05 MIU/L-ACNC: 2.87 UIU/ML (ref 0.36–3.74)
WBC # BLD AUTO: 5.58 THOUSAND/UL (ref 4.31–10.16)

## 2020-05-15 PROCEDURE — 36415 COLL VENOUS BLD VENIPUNCTURE: CPT

## 2020-05-15 PROCEDURE — 83970 ASSAY OF PARATHORMONE: CPT

## 2020-05-15 PROCEDURE — 82306 VITAMIN D 25 HYDROXY: CPT

## 2020-05-15 PROCEDURE — 85025 COMPLETE CBC W/AUTO DIFF WBC: CPT

## 2020-05-15 PROCEDURE — 84443 ASSAY THYROID STIM HORMONE: CPT

## 2020-05-15 PROCEDURE — 80061 LIPID PANEL: CPT

## 2020-05-15 PROCEDURE — 80053 COMPREHEN METABOLIC PANEL: CPT

## 2020-12-29 ENCOUNTER — IMMUNIZATIONS (OUTPATIENT)
Dept: FAMILY MEDICINE CLINIC | Facility: HOSPITAL | Age: 65
End: 2020-12-29
Payer: COMMERCIAL

## 2020-12-29 DIAGNOSIS — Z23 ENCOUNTER FOR IMMUNIZATION: ICD-10-CM

## 2020-12-29 PROCEDURE — 91301 SARS-COV-2 / COVID-19 MRNA VACCINE (MODERNA) 100 MCG: CPT

## 2020-12-29 PROCEDURE — 0011A SARS-COV-2 / COVID-19 MRNA VACCINE (MODERNA) 100 MCG: CPT

## 2021-01-15 ENCOUNTER — APPOINTMENT (OUTPATIENT)
Dept: LAB | Facility: CLINIC | Age: 66
End: 2021-01-15
Payer: COMMERCIAL

## 2021-01-15 ENCOUNTER — TRANSCRIBE ORDERS (OUTPATIENT)
Dept: LAB | Facility: CLINIC | Age: 66
End: 2021-01-15

## 2021-01-15 DIAGNOSIS — E78.5 HYPERLIPIDEMIA, UNSPECIFIED HYPERLIPIDEMIA TYPE: ICD-10-CM

## 2021-01-15 DIAGNOSIS — E03.9 HYPOTHYROIDISM, UNSPECIFIED TYPE: ICD-10-CM

## 2021-01-15 DIAGNOSIS — I10 HYPERTENSION, UNSPECIFIED TYPE: ICD-10-CM

## 2021-01-15 DIAGNOSIS — Z51.81 ENCOUNTER FOR THERAPEUTIC DRUG MONITORING: ICD-10-CM

## 2021-01-15 DIAGNOSIS — E78.5 HYPERLIPIDEMIA, UNSPECIFIED HYPERLIPIDEMIA TYPE: Primary | ICD-10-CM

## 2021-01-15 DIAGNOSIS — E83.52 HYPERCALCEMIA: ICD-10-CM

## 2021-01-15 LAB
ALT SERPL W P-5'-P-CCNC: 28 U/L (ref 12–78)
ANION GAP SERPL CALCULATED.3IONS-SCNC: 3 MMOL/L (ref 4–13)
BUN SERPL-MCNC: 27 MG/DL (ref 5–25)
CALCIUM SERPL-MCNC: 10.5 MG/DL (ref 8.3–10.1)
CHLORIDE SERPL-SCNC: 104 MMOL/L (ref 100–108)
CO2 SERPL-SCNC: 30 MMOL/L (ref 21–32)
CREAT SERPL-MCNC: 0.84 MG/DL (ref 0.6–1.3)
GFR SERPL CREATININE-BSD FRML MDRD: 72 ML/MIN/1.73SQ M
GLUCOSE P FAST SERPL-MCNC: 81 MG/DL (ref 65–99)
LDLC SERPL DIRECT ASSAY-MCNC: 126 MG/DL (ref 0–100)
POTASSIUM SERPL-SCNC: 4 MMOL/L (ref 3.5–5.3)
SODIUM SERPL-SCNC: 137 MMOL/L (ref 136–145)
TSH SERPL DL<=0.05 MIU/L-ACNC: 3.08 UIU/ML (ref 0.36–3.74)

## 2021-01-15 PROCEDURE — 84443 ASSAY THYROID STIM HORMONE: CPT

## 2021-01-15 PROCEDURE — 36415 COLL VENOUS BLD VENIPUNCTURE: CPT

## 2021-01-15 PROCEDURE — 80048 BASIC METABOLIC PNL TOTAL CA: CPT

## 2021-01-15 PROCEDURE — 84460 ALANINE AMINO (ALT) (SGPT): CPT

## 2021-01-15 PROCEDURE — 83721 ASSAY OF BLOOD LIPOPROTEIN: CPT

## 2021-01-25 ENCOUNTER — IMMUNIZATIONS (OUTPATIENT)
Dept: FAMILY MEDICINE CLINIC | Facility: HOSPITAL | Age: 66
End: 2021-01-25

## 2021-01-25 DIAGNOSIS — Z23 ENCOUNTER FOR IMMUNIZATION: Primary | ICD-10-CM

## 2021-01-25 PROCEDURE — 0012A SARS-COV-2 / COVID-19 MRNA VACCINE (MODERNA) 100 MCG: CPT

## 2021-01-25 PROCEDURE — 91301 SARS-COV-2 / COVID-19 MRNA VACCINE (MODERNA) 100 MCG: CPT

## 2021-05-11 ENCOUNTER — TRANSCRIBE ORDERS (OUTPATIENT)
Dept: LAB | Facility: CLINIC | Age: 66
End: 2021-05-11

## 2021-05-11 ENCOUNTER — APPOINTMENT (OUTPATIENT)
Dept: LAB | Facility: CLINIC | Age: 66
End: 2021-05-11
Payer: COMMERCIAL

## 2021-05-11 DIAGNOSIS — Z51.81 ENCOUNTER FOR THERAPEUTIC DRUG MONITORING: ICD-10-CM

## 2021-05-11 DIAGNOSIS — E03.9 HYPOTHYROIDISM, UNSPECIFIED TYPE: ICD-10-CM

## 2021-05-11 DIAGNOSIS — E21.3 HYPERPARATHYROIDISM, UNSPECIFIED (HCC): ICD-10-CM

## 2021-05-11 DIAGNOSIS — E03.9 HYPOTHYROIDISM, UNSPECIFIED TYPE: Primary | ICD-10-CM

## 2021-05-11 LAB
PTH-INTACT SERPL-MCNC: 73.2 PG/ML (ref 18.4–80.1)
TSH SERPL DL<=0.05 MIU/L-ACNC: 3.44 UIU/ML (ref 0.36–3.74)

## 2021-05-11 PROCEDURE — 36415 COLL VENOUS BLD VENIPUNCTURE: CPT

## 2021-05-11 PROCEDURE — 83970 ASSAY OF PARATHORMONE: CPT

## 2021-05-11 PROCEDURE — 84443 ASSAY THYROID STIM HORMONE: CPT

## 2021-05-13 ENCOUNTER — TRANSCRIBE ORDERS (OUTPATIENT)
Dept: ADMINISTRATIVE | Facility: HOSPITAL | Age: 66
End: 2021-05-13

## 2021-05-13 DIAGNOSIS — N83.201 UNSPECIFIED OVARIAN CYST, RIGHT SIDE: ICD-10-CM

## 2021-05-13 DIAGNOSIS — N18.9 CHRONIC KIDNEY DISEASE, UNSPECIFIED: ICD-10-CM

## 2021-05-13 DIAGNOSIS — R91.8 OTHER NONSPECIFIC ABNORMAL FINDING OF LUNG FIELD: Primary | ICD-10-CM

## 2021-05-14 ENCOUNTER — OFFICE VISIT (OUTPATIENT)
Dept: BARIATRICS | Facility: CLINIC | Age: 66
End: 2021-05-14
Payer: COMMERCIAL

## 2021-05-14 VITALS
DIASTOLIC BLOOD PRESSURE: 60 MMHG | TEMPERATURE: 99.1 F | BODY MASS INDEX: 33.41 KG/M2 | HEIGHT: 66 IN | WEIGHT: 207.9 LBS | HEART RATE: 89 BPM | SYSTOLIC BLOOD PRESSURE: 108 MMHG | RESPIRATION RATE: 16 BRPM

## 2021-05-14 DIAGNOSIS — E66.9 OBESITY, CLASS I, BMI 30-34.9: Primary | ICD-10-CM

## 2021-05-14 DIAGNOSIS — E03.9 HYPOTHYROIDISM: ICD-10-CM

## 2021-05-14 DIAGNOSIS — E78.5 HYPERLIPIDEMIA: ICD-10-CM

## 2021-05-14 DIAGNOSIS — I10 ESSENTIAL (PRIMARY) HYPERTENSION: ICD-10-CM

## 2021-05-14 PROBLEM — I34.0 NONRHEUMATIC MITRAL (VALVE) INSUFFICIENCY: Status: ACTIVE | Noted: 2021-05-14

## 2021-05-14 PROBLEM — Q61.3 POLYCYSTIC KIDNEY, UNSPECIFIED: Status: ACTIVE | Noted: 2021-05-14

## 2021-05-14 PROCEDURE — 1036F TOBACCO NON-USER: CPT | Performed by: PHYSICIAN ASSISTANT

## 2021-05-14 PROCEDURE — 99242 OFF/OP CONSLTJ NEW/EST SF 20: CPT | Performed by: PHYSICIAN ASSISTANT

## 2021-05-14 PROCEDURE — 1160F RVW MEDS BY RX/DR IN RCRD: CPT | Performed by: PHYSICIAN ASSISTANT

## 2021-05-14 PROCEDURE — 3008F BODY MASS INDEX DOCD: CPT | Performed by: PHYSICIAN ASSISTANT

## 2021-05-14 RX ORDER — MULTIVIT-MIN/IRON/FOLIC ACID/K 18-600-40
2000 CAPSULE ORAL DAILY
COMMUNITY

## 2021-05-14 RX ORDER — ALPRAZOLAM 0.25 MG/1
1 TABLET ORAL AS NEEDED
COMMUNITY
Start: 2021-05-12

## 2021-05-14 NOTE — PROGRESS NOTES
-Discussed options of Healthy[facility]-Intensive Lifestyle Intervention Program, Very Low Calorie Diet-VLCD, Conservative Program, Johnnie-En-Y Gastric Bypass and Vertical Sleeve Gastrectomy and the role of weight loss medications   -Initial weight loss goal of 5-10% weight loss for improved health  -Screening labs completed   -Patient is interested in pursuing [facility]-Intensive Lifestyle Intervention Program - she is travelling end of this month and wants to start program after she returns at the beginning of June   - advised monitoring her BP as she loses weight - note for any BP under 100/60  - will see patient longterm through [facility]     Assessment/Plan:      Goals:  Food log (ie ) www myfitnesspal com,sparkpeople  com,loseit com,calorieking  com,etc  baritastic  No sugary beverages  At least 64oz of water daily  Increase physical activity by 10 minutes daily  Gradually increase physical activity to a goal of 5 days per week for 30 minutes of MODERATE intensity PLUS 2 days per week of FULL BODY resistance training  5-10 servings of fruits and vegetables per day and 25-35 grams of dietary fiber per day, gradually increasing    Diagnoses and all orders for this visit:    Obesity, Class I, BMI 30-34 9    Essential (primary) hypertension    Hyperlipidemia    Hypothyroidism    Other orders  -     Cholecalciferol (Vitamin D) 50 MCG (2000 UT) CAPS; 2,000 Units daily  -     ALPRAZolam (Xanax) 0 25 mg tablet; Take 1 tablet by mouth as needed  -     nystatin-triamcinolone (MYCOLOG-II) cream; Apply 1 application topically as needed        Subjective:   Chief Complaint   Patient presents with    Consult     MWM consult, stopbang measurements taken     Patient ID: Odalis Mohan  is a 74 y o  female with excess weight/obesity here to pursue weight management    Past Medical History:   Diagnosis Date    Allergic rhinitis     Disease of thyroid gland     GERD (gastroesophageal reflux disease)     Hypercholesteremia  Hypertension     Hypertension      HPI:  Obesity/Excess Weight:  Severity: class II   Onset:  Since teenage years     Modifiers: Diet and Exercise  Contributing factors: Poor Food Choices and Insufficient Physical Activity  Associated symptoms: comorbid conditions and clothes do not fit  Colonoscopy-UTD    Goals: at least 50 lbs   STOPBAN/8    The following portions of the patient's history were reviewed and updated as appropriate: allergies, current medications, past family history, past medical history, past social history, past surgical history and problem list     Review of Systems   Constitutional: Negative  Respiratory: Negative  Cardiovascular: Negative  Gastrointestinal: Negative  Neurological: Negative  Psychiatric/Behavioral: Negative  Objective:    /60 (BP Location: Left arm, Patient Position: Sitting, Cuff Size: Adult)   Pulse 89   Temp 99 1 °F (37 3 °C) (Tympanic)   Resp 16   Ht 5' 5 5" (1 664 m)   Wt 94 3 kg (207 lb 14 4 oz)   BMI 34 07 kg/m²     Physical Exam  Vitals signs and nursing note reviewed  Constitutional:       Appearance: Normal appearance  She is obese  HENT:      Head: Normocephalic and atraumatic  Eyes:      Extraocular Movements: Extraocular movements intact  Pupils: Pupils are equal, round, and reactive to light  Neck:      Musculoskeletal: Normal range of motion  Cardiovascular:      Rate and Rhythm: Normal rate  Pulmonary:      Effort: Pulmonary effort is normal    Musculoskeletal: Normal range of motion  Skin:     General: Skin is warm and dry  Neurological:      General: No focal deficit present  Mental Status: She is alert and oriented to person, place, and time     Psychiatric:         Mood and Affect: Mood normal

## 2021-05-14 NOTE — PATIENT INSTRUCTIONS
Goals: Food log (ie ) www myfitnesspal com,sparkpeople  com,loseit com,calorieking  com,etc  baritastic  No sugary beverages  At least 64oz of water daily  Increase physical activity by 10 minutes daily   Gradually increase physical activity to a goal of 5 days per week for 30 minutes of MODERATE intensity PLUS 2 days per week of FULL BODY resistance training  5-10 servings of fruits and vegetables per day and 25-35 grams of dietary fiber per day, gradually increasing  Monitor BP with weight loss (notufy provider if under 100/60)

## 2021-07-02 NOTE — PROGRESS NOTES
Weight Management Medical Nutrition Assessment  Rosaline Bermudez is here for initial RD session for [facility]  Current wt 211 3 lbs  Reports minimal water intake and larger portions than needed  Also lacking protein at times  Meal plan and suggestions provided as well as food scale  Benefits of interval eating discussed  Patient seen by Medical Provider in past 6 months:  yes  Requested to schedule appointment with Medical Provider: Yes    Anthropometric Measurements  Start Weight (#): 211 3 lb   Ideal Body Weight (#): 127 5 lbs  Goal Weight (#): lose 50 lbs  Highest: 225 lbs (teen years 240 lbs)  Lowest: 160 lbs     Weight Loss History  Previous weight loss attempts: Exercise  Self Created Diets (Portion Control, Healthy Food Choices, etc )  Optifast    Food and Nutrition Related History  Wake up: 7:00   Bed Time:     Food Recall  Breakfast: 9:00-9:30: lowfat greek yogurt    Snack: skip  Lunch: 12:30: leftover pasta, meatball OR out chicken in [place] OR 1/2 s/w (wegmans oatmeal), 5 pieces thin turkey or ham, 1 piece of cheese, 1 tsp alarcon & soup from [facility]   Snack: skip  Dinner: 5:30-6:00: ~6 oz protein, ~1/2 cup carb (corn), tomato/cucumber/feta salad  Snack: mini yulissa daez    Beverages: water and coffee/tea, coke zero, social alcohol   Volume of beverage intake: maybe 2 bottles water, 1 1/2 cup coffee w/h/h, sweet cream, truvia, 1 coke zero, iced tea sweetened     Weekends: Same  Cravings: pizza, chips  Trouble area of day: night    Frequency of Eating out: 1x/wk  Food restrictions: n/a  Cooking: self   Food Shopping: self    Physical Activity Intake  Activity:none currently  Frequency:n/a  Physical limitations/barriers to exercise: knee pain    Estimated Needs  Energy  [employer] [place] Energy Needs:  BMR :7367   1-2# loss weekly sedentary: 002-4131             1-2# loss weekly lightly active: 3946-2147  Maintenance calories for sedentary activity level:  1797  Protein: 70-87 gm      (1 2-1 5g/kg IBW)  Fluid: 68 oz (35mL/kg IBW)    Nutrition Diagnosis  Yes; Overweight/obesity  related to Excess energy intake as evidenced by  BMI more than normative standard for age and sex (obesity-grade I 26-30  9)       Nutrition Intervention    Nutrition Prescription  Calories: 8407-6100  Protein:  gm pro    Meal Plan (Jayy/Pro)  Breakfast: 150-225, 22-30  Snack: skip  Lunch: 350-450, 20-28  Snack: 100-150, 5-10  Dinner: 325-400, 21-28  Snack: 100-150, 5-10    Nutrition Education:    Healthy Core Manual  Calorie controlled menu  Lean protein food choices  Healthy snack options  Food journaling tips      Nutrition Counseling:  Strategies: meal planning, portion sizes, healthy snack choices, hydration, fiber intake, protein intake, exercise, food journal      Monitoring and Evaluation:  Evaluation criteria:  Energy Intake  Meet protein needs  Maintain adequate hydration  Monitor weekly weight  Meal planning/preparation  Food journal   Decreased portions at mealtimes and snacks  Physical activity     Barriers to learning:none  Readiness to change: Preparation:  (Getting ready to change)   Comprehension: good  Expected Compliance: good

## 2021-07-07 ENCOUNTER — OFFICE VISIT (OUTPATIENT)
Dept: BARIATRICS | Facility: CLINIC | Age: 66
End: 2021-07-07

## 2021-07-07 VITALS — WEIGHT: 211.3 LBS | HEIGHT: 66 IN | BODY MASS INDEX: 33.96 KG/M2

## 2021-07-07 DIAGNOSIS — R63.5 ABNORMAL WEIGHT GAIN: ICD-10-CM

## 2021-07-07 PROCEDURE — WMPFE WEIGHT MANAGEMENT PRO FEE EMPLOYEE

## 2021-07-07 PROCEDURE — 3008F BODY MASS INDEX DOCD: CPT | Performed by: PHYSICIAN ASSISTANT

## 2021-07-07 PROCEDURE — RECHECK

## 2021-07-22 ENCOUNTER — CLINICAL SUPPORT (OUTPATIENT)
Dept: BARIATRICS | Facility: CLINIC | Age: 66
End: 2021-07-22

## 2021-07-22 VITALS — BODY MASS INDEX: 34.41 KG/M2 | WEIGHT: 210 LBS

## 2021-07-22 DIAGNOSIS — R63.5 ABNORMAL WEIGHT GAIN: Primary | ICD-10-CM

## 2021-07-22 PROCEDURE — RECHECK

## 2021-08-05 ENCOUNTER — CLINICAL SUPPORT (OUTPATIENT)
Dept: BARIATRICS | Facility: CLINIC | Age: 66
End: 2021-08-05

## 2021-08-05 VITALS — BODY MASS INDEX: 34.82 KG/M2 | WEIGHT: 209 LBS | HEIGHT: 65 IN

## 2021-08-05 DIAGNOSIS — R63.5 ABNORMAL WEIGHT GAIN: Primary | ICD-10-CM

## 2021-08-05 PROCEDURE — RECHECK

## 2021-08-12 ENCOUNTER — CLINICAL SUPPORT (OUTPATIENT)
Dept: BARIATRICS | Facility: CLINIC | Age: 66
End: 2021-08-12

## 2021-08-12 VITALS — HEIGHT: 65 IN | BODY MASS INDEX: 34.92 KG/M2 | WEIGHT: 209.6 LBS

## 2021-08-12 DIAGNOSIS — R63.5 ABNORMAL WEIGHT GAIN: Primary | ICD-10-CM

## 2021-08-12 PROCEDURE — RECHECK

## 2021-08-26 ENCOUNTER — CLINICAL SUPPORT (OUTPATIENT)
Dept: BARIATRICS | Facility: CLINIC | Age: 66
End: 2021-08-26

## 2021-08-26 VITALS — HEIGHT: 65 IN | BODY MASS INDEX: 34.92 KG/M2 | WEIGHT: 209.6 LBS

## 2021-08-26 DIAGNOSIS — R63.5 ABNORMAL WEIGHT GAIN: Primary | ICD-10-CM

## 2021-08-26 PROCEDURE — RECHECK

## 2021-09-02 ENCOUNTER — CLINICAL SUPPORT (OUTPATIENT)
Dept: BARIATRICS | Facility: CLINIC | Age: 66
End: 2021-09-02

## 2021-09-02 VITALS — WEIGHT: 208.2 LBS | HEIGHT: 65 IN | BODY MASS INDEX: 34.69 KG/M2

## 2021-09-02 DIAGNOSIS — R63.5 ABNORMAL WEIGHT GAIN: Primary | ICD-10-CM

## 2021-09-02 PROCEDURE — RECHECK

## 2021-09-14 NOTE — PROGRESS NOTES
Name was verified by patient stating name? Yes   verified by patient stating? Yes  Verified the patient is alone? Yes  Offered patient a live visit but are now consenting to this virtual visit? Yes  Verified with the patient this visit will go towards their pre paid charges? Yes     Weight Management Medical Nutrition Assessment  Nidia Camara is present for f/u RD session for [facility]  Current wt unknown  Seca not yet completed as her schedule has made it difficult to have her f/u appointments  Has been having some abdominal spasms but does not feel it is affecting her appetite  Feels she has really improved water intake, almost double what she was drinking  Focusing on getting adequate protein  Does feel she needs a snack between breakfast and lunch, suggestions provided  She is noticing positive changes to her clothing size       Patient seen by Medical Provider in past 6 months:  yes  Requested to schedule appointment with Medical Provider: No    Anthropometric Measurements  Start Weight (#): 211 3 lb 21  Current wt: - lbs  Ideal Body Weight (#): 127 5 lbs  Goal Weight (#): lose 50 lbs  Highest: 225 lbs (teen years 240 lbs)  Lowest: 160 lbs     Weight Loss History  Previous weight loss attempts: Exercise  Self Created Diets (Portion Control, Healthy Food Choices, etc )  Optifast    Food and Nutrition Related History  Wake up: 7:00   Bed Time:     Food Recall  Breakfast: 8:30-9:00: premier protein, english muffin, pb OR greek yogurt, fruit   Snack: skip  Lunch: 12:30: leftovers 3-4 oz protein, 1/2 cup carb, salad     Snack: protein bar OR string cheese/fruit  Dinner: 6:30-7:00: ~6 oz protein, large veggies  Snack: yasso (160 venkatesh)    Beverages: water and coffee/tea, coke zero, social alcohol   Volume of beverage intake: 4-5 bottles water, 1 1/2 cup coffee w/h/h, sweet cream, truvia, 1 coke zero, iced tea sweetened     Weekends: Same  Cravings: pizza, chips  Trouble area of day: night    Frequency of Eating out: 1x/wk  Food restrictions: n/a  Cooking: self   Food Shopping: self    Physical Activity Intake  Activity:none currently  Frequency:n/a  Physical limitations/barriers to exercise: knee pain    Estimated Needs  Energy  [place] Energy Needs: BMR :3211   1-2# loss weekly sedentary: 799-0147             1-2# loss weekly lightly active: 5472-2837  Maintenance calories for sedentary activity level:  1797  Protein: 70-87 gm      (1 2-1 5g/kg IBW)  Fluid: 68 oz   (35mL/kg IBW)    Nutrition Diagnosis  Yes; Overweight/obesity  related to Excess energy intake as evidenced by  BMI more than normative standard for age and sex (obesity-grade I 26-30  9)       Nutrition Intervention    Nutrition Prescription  Calories: 3231-5878  Protein:  gm pro    Meal Plan (Jayy/Pro)  Breakfast: 150-225, 22-30  Snack: 100-150, 5-10  Lunch: 300-400, 20-28  Snack: 100-150, 5-10  Dinner: 300-400, 21-28  Snack: 100-150, 5-10    Nutrition Education:    Healthy Core Manual  Calorie controlled menu  Lean protein food choices  Healthy snack options  Food journaling tips      Nutrition Counseling:  Strategies: meal planning, portion sizes, healthy snack choices, hydration, fiber intake, protein intake, exercise, food journal      Monitoring and Evaluation:  Evaluation criteria:  Energy Intake  Meet protein needs  Maintain adequate hydration  Monitor weekly weight  Meal planning/preparation  Food journal   Decreased portions at mealtimes and snacks  Physical activity     Barriers to learning:none  Readiness to change: Action:  (Changing behavior)  Comprehension: good  Expected Compliance: good

## 2021-09-20 ENCOUNTER — TELEMEDICINE (OUTPATIENT)
Dept: BARIATRICS | Facility: CLINIC | Age: 66
End: 2021-09-20

## 2021-09-20 DIAGNOSIS — R63.5 ABNORMAL WEIGHT GAIN: Primary | ICD-10-CM

## 2021-09-20 PROCEDURE — RECHECK

## 2021-09-23 ENCOUNTER — CLINICAL SUPPORT (OUTPATIENT)
Dept: BARIATRICS | Facility: CLINIC | Age: 66
End: 2021-09-23

## 2021-09-23 VITALS — HEIGHT: 65 IN | WEIGHT: 205.4 LBS | BODY MASS INDEX: 34.22 KG/M2

## 2021-09-23 DIAGNOSIS — R63.5 ABNORMAL WEIGHT GAIN: Primary | ICD-10-CM

## 2021-09-23 PROCEDURE — RECHECK

## 2021-10-07 ENCOUNTER — CLINICAL SUPPORT (OUTPATIENT)
Dept: BARIATRICS | Facility: CLINIC | Age: 66
End: 2021-10-07

## 2021-10-07 VITALS — WEIGHT: 205.8 LBS | HEIGHT: 65 IN | BODY MASS INDEX: 34.29 KG/M2

## 2021-10-07 DIAGNOSIS — R63.5 ABNORMAL WEIGHT GAIN: Primary | ICD-10-CM

## 2021-10-07 PROCEDURE — RECHECK

## 2021-10-21 ENCOUNTER — CLINICAL SUPPORT (OUTPATIENT)
Dept: BARIATRICS | Facility: CLINIC | Age: 66
End: 2021-10-21

## 2021-10-21 VITALS — BODY MASS INDEX: 34.29 KG/M2 | WEIGHT: 205.8 LBS | HEIGHT: 65 IN

## 2021-10-21 DIAGNOSIS — R63.5 ABNORMAL WEIGHT GAIN: Primary | ICD-10-CM

## 2021-10-21 PROCEDURE — RECHECK

## 2021-11-01 ENCOUNTER — OFFICE VISIT (OUTPATIENT)
Dept: BARIATRICS | Facility: CLINIC | Age: 66
End: 2021-11-01

## 2021-11-01 VITALS — BODY MASS INDEX: 32.92 KG/M2 | WEIGHT: 204.8 LBS | HEIGHT: 66 IN

## 2021-11-01 DIAGNOSIS — R63.5 ABNORMAL WEIGHT GAIN: Primary | ICD-10-CM

## 2021-11-01 PROCEDURE — RECHECK

## 2021-11-11 ENCOUNTER — CLINICAL SUPPORT (OUTPATIENT)
Dept: BARIATRICS | Facility: CLINIC | Age: 66
End: 2021-11-11

## 2021-11-11 VITALS — HEIGHT: 66 IN | BODY MASS INDEX: 32.78 KG/M2 | WEIGHT: 204 LBS

## 2021-11-11 DIAGNOSIS — R63.5 ABNORMAL WEIGHT GAIN: Primary | ICD-10-CM

## 2021-11-11 PROCEDURE — RECHECK

## 2021-11-23 ENCOUNTER — CLINICAL SUPPORT (OUTPATIENT)
Dept: BARIATRICS | Facility: CLINIC | Age: 66
End: 2021-11-23

## 2021-11-23 DIAGNOSIS — R63.5 ABNORMAL WEIGHT GAIN: Primary | ICD-10-CM

## 2021-11-23 PROCEDURE — RECHECK

## 2021-11-24 VITALS — BODY MASS INDEX: 33.27 KG/M2 | WEIGHT: 203 LBS

## 2021-12-09 ENCOUNTER — CLINICAL SUPPORT (OUTPATIENT)
Dept: BARIATRICS | Facility: CLINIC | Age: 66
End: 2021-12-09

## 2021-12-09 VITALS — HEIGHT: 66 IN | WEIGHT: 203.6 LBS | BODY MASS INDEX: 32.72 KG/M2

## 2021-12-09 DIAGNOSIS — R63.5 ABNORMAL WEIGHT GAIN: Primary | ICD-10-CM

## 2021-12-09 PROCEDURE — RECHECK

## 2021-12-14 ENCOUNTER — OFFICE VISIT (OUTPATIENT)
Dept: BARIATRICS | Facility: CLINIC | Age: 66
End: 2021-12-14

## 2021-12-14 VITALS — BODY MASS INDEX: 32.58 KG/M2 | HEIGHT: 66 IN | WEIGHT: 202.72 LBS

## 2021-12-14 DIAGNOSIS — R63.5 ABNORMAL WEIGHT GAIN: Primary | ICD-10-CM

## 2021-12-14 PROCEDURE — RECHECK

## 2021-12-16 ENCOUNTER — OFFICE VISIT (OUTPATIENT)
Dept: BARIATRICS | Facility: CLINIC | Age: 66
End: 2021-12-16
Payer: COMMERCIAL

## 2021-12-16 VITALS
HEIGHT: 66 IN | HEART RATE: 67 BPM | TEMPERATURE: 97.3 F | WEIGHT: 203.7 LBS | DIASTOLIC BLOOD PRESSURE: 70 MMHG | BODY MASS INDEX: 32.74 KG/M2 | RESPIRATION RATE: 16 BRPM | SYSTOLIC BLOOD PRESSURE: 116 MMHG

## 2021-12-16 DIAGNOSIS — I10 ESSENTIAL (PRIMARY) HYPERTENSION: ICD-10-CM

## 2021-12-16 DIAGNOSIS — E66.9 OBESITY, CLASS I, BMI 30-34.9: ICD-10-CM

## 2021-12-16 DIAGNOSIS — E78.5 HYPERLIPIDEMIA: ICD-10-CM

## 2021-12-16 DIAGNOSIS — E03.9 HYPOTHYROIDISM: Primary | ICD-10-CM

## 2021-12-16 PROBLEM — E66.811 OBESITY, CLASS I, BMI 30-34.9: Status: ACTIVE | Noted: 2021-12-16

## 2021-12-16 PROCEDURE — 99214 OFFICE O/P EST MOD 30 MIN: CPT | Performed by: PHYSICIAN ASSISTANT

## 2021-12-16 RX ORDER — FLUOROURACIL 50 MG/G
1 CREAM TOPICAL DAILY
COMMUNITY

## 2021-12-16 RX ORDER — LATANOPROSTENE BUNOD 0.24 MG/ML
1 SOLUTION/ DROPS OPHTHALMIC DAILY
COMMUNITY

## 2021-12-30 ENCOUNTER — APPOINTMENT (OUTPATIENT)
Dept: LAB | Facility: CLINIC | Age: 66
End: 2021-12-30
Payer: COMMERCIAL

## 2021-12-30 DIAGNOSIS — Z51.81 MEDICATION MONITORING ENCOUNTER: ICD-10-CM

## 2021-12-30 DIAGNOSIS — I10 HYPERTENSION, UNSPECIFIED TYPE: ICD-10-CM

## 2021-12-30 DIAGNOSIS — E78.5 HYPERLIPIDEMIA, UNSPECIFIED HYPERLIPIDEMIA TYPE: ICD-10-CM

## 2021-12-30 DIAGNOSIS — E03.9 HYPOTHYROIDISM, UNSPECIFIED TYPE: ICD-10-CM

## 2021-12-30 DIAGNOSIS — E55.9 MILD VITAMIN D DEFICIENCY: ICD-10-CM

## 2021-12-30 LAB
25(OH)D3 SERPL-MCNC: 35.8 NG/ML (ref 30–100)
ALBUMIN SERPL BCP-MCNC: 3.9 G/DL (ref 3.5–5)
ALP SERPL-CCNC: 59 U/L (ref 46–116)
ALT SERPL W P-5'-P-CCNC: 26 U/L (ref 12–78)
ANION GAP SERPL CALCULATED.3IONS-SCNC: 4 MMOL/L (ref 4–13)
AST SERPL W P-5'-P-CCNC: 16 U/L (ref 5–45)
BILIRUB SERPL-MCNC: 0.57 MG/DL (ref 0.2–1)
BUN SERPL-MCNC: 24 MG/DL (ref 5–25)
CALCIUM SERPL-MCNC: 10.6 MG/DL (ref 8.3–10.1)
CHLORIDE SERPL-SCNC: 107 MMOL/L (ref 100–108)
CHOLEST SERPL-MCNC: 167 MG/DL
CO2 SERPL-SCNC: 28 MMOL/L (ref 21–32)
CREAT SERPL-MCNC: 0.89 MG/DL (ref 0.6–1.3)
GFR SERPL CREATININE-BSD FRML MDRD: 66 ML/MIN/1.73SQ M
GLUCOSE P FAST SERPL-MCNC: 90 MG/DL (ref 65–99)
HDLC SERPL-MCNC: 43 MG/DL
LDLC SERPL CALC-MCNC: 79 MG/DL (ref 0–100)
NONHDLC SERPL-MCNC: 124 MG/DL
POTASSIUM SERPL-SCNC: 4.1 MMOL/L (ref 3.5–5.3)
PROT SERPL-MCNC: 7.1 G/DL (ref 6.4–8.2)
SODIUM SERPL-SCNC: 139 MMOL/L (ref 136–145)
TRIGL SERPL-MCNC: 227 MG/DL
TSH SERPL DL<=0.05 MIU/L-ACNC: 4.75 UIU/ML (ref 0.36–3.74)

## 2021-12-30 PROCEDURE — 84443 ASSAY THYROID STIM HORMONE: CPT

## 2021-12-30 PROCEDURE — 82306 VITAMIN D 25 HYDROXY: CPT

## 2021-12-30 PROCEDURE — 80061 LIPID PANEL: CPT

## 2021-12-30 PROCEDURE — 80053 COMPREHEN METABOLIC PANEL: CPT

## 2021-12-30 PROCEDURE — 36415 COLL VENOUS BLD VENIPUNCTURE: CPT

## 2022-01-03 NOTE — PROGRESS NOTES
Name was verified by patient stating name? Yes   verified by patient stating? Yes  Verified the patient is alone? Yes  Offered patient a live visit but are now consenting to this virtual visit? Yes  Verified with the patient this visit will go towards their pre paid charges? Yes   Verified that patient is located in the state of [place]? [place]    Weight Management Medical Nutrition Assessment  Martin Rodriguez is present virtually for f/u RD session for [facility]  Feels she has maintained her weight over the holidays  Less hunger in the evening now that she is making more of an effort to have breakfast  Has been going back to the gym which she is happy about  Continues to work on water intake  She has no concerns at this time  She will f/u in 1 month        Patient seen by Medical Provider in past 6 months:  yes  Requested to schedule appointment with Medical Provider: No    Anthropometric Measurements  Start Weight (#): 211 3 lb 21  Current wt: -   lbs  Ideal Body Weight (#): 127 5 lbs  Goal Weight (#): lose 50 lbs  Highest: 225 lbs (teen years 240 lbs)  Lowest: 160 lbs     Weight Loss History  Previous weight loss attempts: Exercise  Self Created Diets (Portion Control, Healthy Food Choices, etc )  [facility]    Food and Nutrition Related History  Wake up: 7:00   Bed Time:     Food Recall  Breakfast: 8:30-10:00:  Premier protein OR cottage cheese, fruit OR oatmeal, fruit  Snack: skip     Lunch: 12:30: turkey s/w, veggies OR leftovers 3-4 oz protein, 1/2 cup rice/noodles, salad or other veggie  OR 1/2 s/w, 1/2 cup soup    Snack: fruit & string cheese, celery OR cashews OR yogurt  Dinner: 6:30-7:00: ~3-4 oz protein, large veggies, 1/2 cup carb  Snack: yasso OR fruit    Beverages: water and coffee/tea, coke zero, social alcohol   Volume of beverage intake: water varies, 1 1/2 cup coffee w/h/h, sweet cream, truvia, 1 coke zero, iced tea sweetened     Weekends: Same  Cravings: pizza, chips  Trouble area of day: night    Frequency of Eating out: 1x/wk  Food restrictions: n/a  Cooking: self   Food Shopping: self    Physical Activity Intake  Activity:none currently  Frequency:n/a  Physical limitations/barriers to exercise: knee pain    Estimated Needs  Energy  Seca REE 1494x1  [phone]=235  ReeVue REE 1469, wt loss w/o exercise 1910-0303, w/exercise 6935-3319  [place] [address] Energy Needs: BMR :1959   1-2# loss weekly sedentary: 985-4488             1-2# loss weekly lightly active: 0622-5075  Maintenance calories for sedentary activity level:  1797  Protein: 70-87 gm      (1 2-1 5g/kg IBW)  Fluid: 68 oz   (35mL/kg IBW)    Nutrition Diagnosis  Yes; Overweight/obesity  related to Excess energy intake as evidenced by  BMI more than normative standard for age and sex (obesity-grade I 26-30  9)       Nutrition Intervention    Nutrition Prescription  Calories: 7713-4773  Protein:  gm pro    Meal Plan (Jayy/Pro)  Breakfast: 150-225, 22-30  Snack: 100-150, 5-10  Lunch: 300-400, 20-28  Snack: 100-150, 5-10  Dinner: 300-400, 21-28  Snack: 100-150, 5-10    Nutrition Education:    Healthy Core Manual  Calorie controlled menu  Lean protein food choices  Healthy snack options  Food journaling tips      Nutrition Counseling:  Strategies: meal planning, portion sizes, healthy snack choices, hydration, fiber intake, protein intake, exercise, food journal      Monitoring and Evaluation:  Evaluation criteria:  Energy Intake  Meet protein needs  Maintain adequate hydration  Monitor weekly weight  Meal planning/preparation  Food journal   Decreased portions at mealtimes and snacks  Physical activity     Barriers to learning:none  Readiness to change: Action:  (Changing behavior)  Comprehension: good  Expected Compliance: good

## 2022-01-04 ENCOUNTER — OFFICE VISIT (OUTPATIENT)
Dept: BARIATRICS | Facility: CLINIC | Age: 67
End: 2022-01-04

## 2022-01-04 DIAGNOSIS — R63.5 ABNORMAL WEIGHT GAIN: Primary | ICD-10-CM

## 2022-01-04 PROCEDURE — RECHECK

## 2022-01-28 NOTE — PROGRESS NOTES
Name was verified by patient stating name? Yes   verified by patient stating? Yes  Verified the patient is alone? Yes  Offered patient a live visit but are now consenting to this virtual visit? Yes  Verified with the patient that she will be contacted to collect payment? Yes   Verified that patient is located in the state of [place]? Yes    Weight Management Medical Nutrition Assessment  Tali Lewis is here virtually for bundle  Self reported weight: 200-201 lbs  Met with exercise physiologist last night to get strength training plan  Goal to go to gym 3-4x/wk  Feels she is doing well with interval eating, not skipping meals  Based on recall recommend add fruit to turkey roll ups at lunch and increase eggs to 2 at breakfast  She has no concerns at this time  F/u in 1 month       Dislikes beans      Patient seen by Medical Provider in past 6 months:  yes  Requested to schedule appointment with Medical Provider: No    Anthropometric Measurements  Start Weight (#): 211 3 lb 21  Current wt: 200-201   Lbs self reported   Ideal Body Weight (#): 127 5 lbs  Goal Weight (#): lose 50 lbs  Highest: 225 lbs (teen years 240 lbs)  Lowest: 160 lbs     Weight Loss History  Previous weight loss attempts: Exercise  Self Created Diets (Portion Control, Healthy Food Choices, etc )  Optifast    Food and Nutrition Related History  Wake up: 7:00   Bed Time:     Food Recall  Breakfast: 8:30-10:00:  Premier protein OR cottage cheese or greek yogurt, fruit OR special k protein, fruit OR egg, fruit OR frozen waffle, turkey sausage   Snack: skip     Lunch: 12:30: turkey roll ups with pickle OR ground chicken chili, veggies OR leftovers 3-4 oz protein, 1/2 cup rice/noodles, salad or other veggie  OR 1/2 s/w, 1/2 cup soup    Snack: fruit OR pistachios    Dinner: 6:30-7:00: ~3-4 oz protein, large veggies, 1/2 cup carb  Snack: fruit    Beverages: water and coffee/tea, coke zero, social alcohol   Volume of beverage intake: water varies, 1 1/2 cup coffee w/h/h, sweet cream, truvia, 1 coke zero, iced tea sweetened     Weekends: Same  Cravings: pizza, chips  Trouble area of day: night    Frequency of Eating out: 1x/wk  Food restrictions: n/a  Cooking: self   Food Shopping: self    Physical Activity Intake  Activity: just starting the gym (strength and cardio)  Frequency:goal 3-4x/wk  Physical limitations/barriers to exercise: knee pain    Estimated Needs  Energy  Seca REE 1494x1  [id number]  ReeVue REE 1469, wt loss w/o exercise 5807-7983, w/exercise 9850-0505  [place] Energy Needs: BMR :0913   1-2# loss weekly sedentary: 525-1142             1-2# loss weekly lightly active: 6301-2784  Maintenance calories for sedentary activity level:  1797  Protein: 70-87 gm      (1 2-1 5g/kg IBW)  Fluid: 68 oz   (35mL/kg IBW)    Nutrition Diagnosis  Yes; Overweight/obesity  related to Excess energy intake as evidenced by  BMI more than normative standard for age and sex (obesity-grade I 26-30  9)       Nutrition Intervention    Nutrition Prescription  Calories: 7686-6386  Protein:  gm pro    Meal Plan (Jayy/Pro)  Breakfast: 150-225, 22-30  Snack: 100-150, 5-10  Lunch: 300-400, 20-28  Snack: 100-150, 5-10  Dinner: 300-400, 21-28  Snack: 100-150, 5-10    Nutrition Education:    Healthy Core Manual  Calorie controlled menu  Lean protein food choices  Healthy snack options  Food journaling tips      Nutrition Counseling:  Strategies: meal planning, portion sizes, healthy snack choices, hydration, fiber intake, protein intake, exercise, food journal      Monitoring and Evaluation:  Evaluation criteria:  Energy Intake  Meet protein needs  Maintain adequate hydration  Monitor weekly weight  Meal planning/preparation  Food journal   Decreased portions at mealtimes and snacks  Physical activity     Barriers to learning:none  Readiness to change: Action:  (Changing behavior)  Comprehension: good  Expected Compliance: good

## 2022-02-01 ENCOUNTER — OFFICE VISIT (OUTPATIENT)
Dept: BARIATRICS | Facility: CLINIC | Age: 67
End: 2022-02-01

## 2022-02-01 DIAGNOSIS — R63.5 ABNORMAL WEIGHT GAIN: ICD-10-CM

## 2022-02-01 PROCEDURE — DB12PK

## 2022-02-01 PROCEDURE — RECHECK

## 2022-02-28 NOTE — PROGRESS NOTES
Name was verified by patient stating name? Yes   verified by patient stating? Yes  Verified the patient is alone? Yes  Offered patient a live visit but are now consenting to this virtual visit? Yes  Verified with the patient that there is no charge for this visit as it is part of pre paid charges? Yes   Verified that patient is located in the state [place]? Yes    Weight Management Medical Nutrition Assessment  Felipe Milligan is here virtually for bundle   Self reported weight: 199 lbs, loss of 12 3 lbs x ~8 months  Busier week this week due to moving but other than that getting to the gym  This has really helped with her water intake  Doing well with interval eating and portion control  She has no concerns at this time  F/u in 2 months       Dislikes beans      Patient seen by Medical Provider in past 6 months:  yes  Requested to schedule appointment with Medical Provider: No    Anthropometric Measurements  Start Weight (#): 211 3 lb 21  Current wt: 199   Lbs self reported   Ideal Body Weight (#): 127 5 lbs  Goal Weight (#): lose 50 lbs  Highest: 225 lbs (teen years 240 lbs)  Lowest: 160 lbs     Weight Loss History  Previous weight loss attempts: Exercise  Self Created Diets (Portion Control, Healthy Food Choices, etc )  Optifast    Food and Nutrition Related History  Wake up: 7:00   Bed Time: 11:30    Food Recall  Breakfast: 8:30-10:00:  Premier protein OR noosa yogurt  Snack: skip     Lunch: 12:30:  ~3 oz salmon, leftover cauli risotto or sweet potato or leftover pasta, veggies  OR tuna on light bread OR turkey/ham roll ups   Snack: fruit  OR yogurt  Dinner: 6:30-7:00: ~3-4 oz protein, large veggies, 1/2 cup carb  Snack: fruit    Beverages: water and coffee/tea, coke zero, social alcohol   Volume of beverage intake: at least 48 oz water, 1 1/2 cup coffee w/h/h, sweet cream, truvia, 1 coke zero, iced tea sweetened     Weekends: Same  Cravings: pizza, chips  Trouble area of day: night    Frequency of Eating out: 1x/wk  Food restrictions: n/a  Cooking: self   Food Shopping: self    Physical Activity Intake  Activity: just starting the gym (strength and cardio)  Frequency:goal 3-4x/wk  Physical limitations/barriers to exercise: knee pain    Estimated Needs  Energy  Seca REE 1494x1  3-1511=029  ReeVue REE 1469, wt loss w/o exercise 6682-4139, w/exercise 4890-8906  [place] Energy Needs: BMR :5643   1-2# loss weekly sedentary: 914-8342             1-2# loss weekly lightly active: 0409-3775  Maintenance calories for sedentary activity level:  1797  Protein: 70-87 gm      (1 2-1 5g/kg IBW)  Fluid: 68 oz   (35mL/kg IBW)    Nutrition Diagnosis  Yes; Overweight/obesity  related to Excess energy intake as evidenced by  BMI more than normative standard for age and sex (obesity-grade I 26-30  9)       Nutrition Intervention    Nutrition Prescription  Calories: 7217-3401  Protein:  gm pro    Meal Plan (Jayy/Pro)  Breakfast: 150-225, 22-30  Snack: 100-150, 5-10  Lunch: 300-400, 20-28  Snack: 100-150, 5-10  Dinner: 300-400, 21-28  Snack: 100-150, 5-10    Nutrition Education:    Healthy Core Manual  Calorie controlled menu  Lean protein food choices  Healthy snack options  Food journaling tips      Nutrition Counseling:  Strategies: meal planning, portion sizes, healthy snack choices, hydration, fiber intake, protein intake, exercise, food journal      Monitoring and Evaluation:  Evaluation criteria:  Energy Intake  Meet protein needs  Maintain adequate hydration  Monitor weekly weight  Meal planning/preparation  Food journal   Decreased portions at mealtimes and snacks  Physical activity     Barriers to learning:none  Readiness to change: Action:  (Changing behavior)  Comprehension: good  Expected Compliance: good

## 2022-03-04 ENCOUNTER — OFFICE VISIT (OUTPATIENT)
Dept: BARIATRICS | Facility: CLINIC | Age: 67
End: 2022-03-04

## 2022-03-04 VITALS — WEIGHT: 199 LBS | BODY MASS INDEX: 31.98 KG/M2 | HEIGHT: 66 IN

## 2022-03-04 DIAGNOSIS — R63.5 ABNORMAL WEIGHT GAIN: Primary | ICD-10-CM

## 2022-03-04 PROCEDURE — RECHECK

## 2022-04-18 ENCOUNTER — APPOINTMENT (OUTPATIENT)
Dept: LAB | Facility: CLINIC | Age: 67
End: 2022-04-18
Payer: COMMERCIAL

## 2022-04-18 DIAGNOSIS — E03.9 HYPOTHYROIDISM, UNSPECIFIED TYPE: ICD-10-CM

## 2022-04-18 DIAGNOSIS — I10 HYPERTENSION, UNSPECIFIED TYPE: ICD-10-CM

## 2022-04-18 DIAGNOSIS — Z51.81 ENCOUNTER FOR THERAPEUTIC DRUG LEVEL MONITORING: ICD-10-CM

## 2022-04-18 LAB
ANION GAP SERPL CALCULATED.3IONS-SCNC: 4 MMOL/L (ref 4–13)
BUN SERPL-MCNC: 24 MG/DL (ref 5–25)
CALCIUM SERPL-MCNC: 10.6 MG/DL (ref 8.3–10.1)
CHLORIDE SERPL-SCNC: 105 MMOL/L (ref 100–108)
CO2 SERPL-SCNC: 28 MMOL/L (ref 21–32)
CREAT SERPL-MCNC: 0.88 MG/DL (ref 0.6–1.3)
GFR SERPL CREATININE-BSD FRML MDRD: 67 ML/MIN/1.73SQ M
GLUCOSE SERPL-MCNC: 105 MG/DL (ref 65–140)
POTASSIUM SERPL-SCNC: 4.4 MMOL/L (ref 3.5–5.3)
SODIUM SERPL-SCNC: 137 MMOL/L (ref 136–145)
TSH SERPL DL<=0.05 MIU/L-ACNC: 1.9 UIU/ML (ref 0.45–4.5)

## 2022-04-18 PROCEDURE — 84443 ASSAY THYROID STIM HORMONE: CPT

## 2022-04-18 PROCEDURE — 36415 COLL VENOUS BLD VENIPUNCTURE: CPT

## 2022-04-18 PROCEDURE — 80048 BASIC METABOLIC PNL TOTAL CA: CPT

## 2022-05-12 ENCOUNTER — OFFICE VISIT (OUTPATIENT)
Dept: BARIATRICS | Facility: CLINIC | Age: 67
End: 2022-05-12

## 2022-05-12 VITALS — BODY MASS INDEX: 31.98 KG/M2 | HEIGHT: 66 IN | WEIGHT: 199 LBS

## 2022-05-12 DIAGNOSIS — R63.5 ABNORMAL WEIGHT GAIN: Primary | ICD-10-CM

## 2022-05-12 PROCEDURE — RECHECK

## 2022-05-12 NOTE — PROGRESS NOTES
Name was verified by patient stating name? Yes   verified by patient stating? Yes  Verified the patient is alone? Yes  Offered patient a live visit but are now consenting to this virtual visit? Yes  Verified with the patient that there is no charge for this visit as it is part of pre paid charges? Yes   Verified that patient is located in the state of [place]? Yes    Weight Management Medical Nutrition Assessment  [facility] is here virtually for bundle 3/12  Self reported weight:  199 lbs, she has maintained her weight over the last 2 1/2 months with overall loss of  12 3 lbs x ~10 months  Has been sick over the last many weeks and is only now starting to feel better  During this time she found herself craving carbs and had a hard time tolerating protein  She was unable to go to the gym during that time  Has been having to take antihistamines which are likely to cause weight gain  Discussed that maintenance during this time is great  Upcoming trip discussed  She has already found a [employer] to go to while there  She will f/u in 5 wks       Dislikes beans      Patient seen by Medical Provider in past 6 months:  yes  Requested to schedule appointment with Medical Provider: No    Anthropometric Measurements  Start Weight (#): 211 3 lb 21  Current wt: 199   Lbs self reported   Ideal Body Weight (#): 127 5 lbs  Goal Weight (#): lose 50 lbs  Highest: 225 lbs (teen years 240 lbs)  Lowest: 160 lbs     Weight Loss History  Previous weight loss attempts: Exercise  Self Created Diets (Portion Control, Healthy Food Choices, etc )  \[facility] [place] [facility]\""    Food and Nutrition Related History  Wake up: 7:00   Bed Time: 11:30    Food Recall>>>not completed 22 due to illness   Breakfast: 8:30-10:00:  Premier protein OR noosa yogurt  Snack: skip     Lunch: 12:30:  ~3 oz salmon, leftover cauli risotto or sweet potato or leftover pasta, veggies  OR tuna on light bread OR turkey/ham roll ups   Snack: fruit  OR yogurt  Dinner: 6:30-7:00: ~3-4 oz protein, large veggies, 1/2 cup carb  Snack: fruit    Beverages: water and coffee/tea, coke zero, social alcohol   Volume of beverage intake: at least 48 oz water, 1 1/2 cup coffee w/h/h, sweet cream, truvia, 1 coke zero, iced tea sweetened     Weekends: Same  Cravings: pizza, chips  Trouble area of day: night    Frequency of Eating out: 1x/wk  Food restrictions: n/a  Cooking: self   Food Shopping: self    Physical Activity Intake  Activity: just starting the gym (strength and cardio)>>>>less currently due to illness   Frequency:goal 3-4x/wk  Physical limitations/barriers to exercise: knee pain    Estimated Needs  Energy  Seca REE 1494x1  [phone]=[phone]  ReeVue REE 1469, wt loss w/o exercise 7939-6278, w/exercise 9397-1870  [facility] Energy Needs: BMR :8150   1-2# loss weekly sedentary: [phone]             1-2# loss weekly lightly active: 8833-1172  Maintenance calories for sedentary activity level:  1797  Protein: 70-87 gm      (1 2-1 5g/kg IBW)  Fluid: 68 oz   (35mL/kg IBW)    Nutrition Diagnosis  Yes; Overweight/obesity  related to Excess energy intake as evidenced by  BMI more than normative standard for age and sex (obesity-grade I 26-30  9)       Nutrition Intervention    Nutrition Prescription  Calories: 9047-5411  Protein:  gm pro    Meal Plan (Jayy/Pro)  Breakfast: 150-225, 22-30  Snack: 100-150, 5-10  Lunch: 300-400, 20-28  Snack: 100-150, 5-10  Dinner: 300-400, 21-28  Snack: 100-150, 5-10    Nutrition Education:    Healthy Core Manual  Calorie controlled menu  Lean protein food choices  Healthy snack options  Food journaling tips      Nutrition Counseling:  Strategies: meal planning, portion sizes, healthy snack choices, hydration, fiber intake, protein intake, exercise, food journal      Monitoring and Evaluation:  Evaluation criteria:  Energy Intake  Meet protein needs  Maintain adequate hydration  Monitor weekly weight  Meal planning/preparation  Food journal   Decreased portions at mealtimes and snacks  Physical activity     Barriers to learning:none  Readiness to change: Action:  (Changing behavior)  Comprehension: good  Expected Compliance: good

## 2022-09-07 ENCOUNTER — APPOINTMENT (OUTPATIENT)
Dept: RADIOLOGY | Facility: HOSPITAL | Age: 67
End: 2022-09-07
Payer: COMMERCIAL

## 2022-09-07 ENCOUNTER — HOSPITAL ENCOUNTER (EMERGENCY)
Facility: HOSPITAL | Age: 67
Discharge: HOME | End: 2022-09-07
Attending: EMERGENCY MEDICINE
Payer: COMMERCIAL

## 2022-09-07 VITALS
WEIGHT: 200.4 LBS | HEART RATE: 73 BPM | RESPIRATION RATE: 16 BRPM | DIASTOLIC BLOOD PRESSURE: 63 MMHG | TEMPERATURE: 97.8 F | BODY MASS INDEX: 33.35 KG/M2 | OXYGEN SATURATION: 100 % | SYSTOLIC BLOOD PRESSURE: 135 MMHG

## 2022-09-07 DIAGNOSIS — K21.9 GERD (GASTROESOPHAGEAL REFLUX DISEASE): ICD-10-CM

## 2022-09-07 DIAGNOSIS — R07.89 NON-CARDIAC CHEST PAIN: Primary | ICD-10-CM

## 2022-09-07 LAB
2HR DELTA HS TROPONIN: 0 NG/L
ANION GAP SERPL CALCULATED.3IONS-SCNC: 10 MMOL/L (ref 4–13)
ATRIAL RATE: 69 BPM
ATRIAL RATE: 79 BPM
BASOPHILS # BLD AUTO: 0.05 THOUSANDS/ΜL (ref 0–0.1)
BASOPHILS NFR BLD AUTO: 0 % (ref 0–1)
BILIRUB UR QL STRIP: NEGATIVE
BUN SERPL-MCNC: 17 MG/DL (ref 5–25)
CALCIUM SERPL-MCNC: 11.1 MG/DL (ref 8.3–10.1)
CARDIAC TROPONIN I PNL SERPL HS: 3 NG/L
CARDIAC TROPONIN I PNL SERPL HS: 3 NG/L
CHLORIDE SERPL-SCNC: 102 MMOL/L (ref 96–108)
CLARITY UR: CLEAR
CO2 SERPL-SCNC: 27 MMOL/L (ref 21–32)
COLOR UR: YELLOW
CREAT SERPL-MCNC: 1 MG/DL (ref 0.6–1.3)
EOSINOPHIL # BLD AUTO: 0.22 THOUSAND/ΜL (ref 0–0.61)
EOSINOPHIL NFR BLD AUTO: 2 % (ref 0–6)
ERYTHROCYTE [DISTWIDTH] IN BLOOD BY AUTOMATED COUNT: 12.6 % (ref 11.6–15.1)
GFR SERPL CREATININE-BSD FRML MDRD: 57 ML/MIN/1.73SQ M
GLUCOSE SERPL-MCNC: 107 MG/DL (ref 65–140)
GLUCOSE UR STRIP-MCNC: NEGATIVE MG/DL
HCT VFR BLD AUTO: 47.8 % (ref 34.8–46.1)
HGB BLD-MCNC: 15.1 G/DL (ref 11.5–15.4)
HGB UR QL STRIP.AUTO: NEGATIVE
IMM GRANULOCYTES # BLD AUTO: 0.04 THOUSAND/UL (ref 0–0.2)
IMM GRANULOCYTES NFR BLD AUTO: 0 % (ref 0–2)
KETONES UR STRIP-MCNC: NEGATIVE MG/DL
LEUKOCYTE ESTERASE UR QL STRIP: NEGATIVE
LYMPHOCYTES # BLD AUTO: 2.17 THOUSANDS/ΜL (ref 0.6–4.47)
LYMPHOCYTES NFR BLD AUTO: 19 % (ref 14–44)
MCH RBC QN AUTO: 29.8 PG (ref 26.8–34.3)
MCHC RBC AUTO-ENTMCNC: 31.6 G/DL (ref 31.4–37.4)
MCV RBC AUTO: 95 FL (ref 82–98)
MONOCYTES # BLD AUTO: 0.59 THOUSAND/ΜL (ref 0.17–1.22)
MONOCYTES NFR BLD AUTO: 5 % (ref 4–12)
NEUTROPHILS # BLD AUTO: 8.51 THOUSANDS/ΜL (ref 1.85–7.62)
NEUTS SEG NFR BLD AUTO: 74 % (ref 43–75)
NITRITE UR QL STRIP: NEGATIVE
NRBC BLD AUTO-RTO: 0 /100 WBCS
NT-PROBNP SERPL-MCNC: 64 PG/ML
P AXIS: 59 DEGREES
P AXIS: 61 DEGREES
PH UR STRIP.AUTO: 5.5 [PH] (ref 4.5–8)
PLATELET # BLD AUTO: 299 THOUSANDS/UL (ref 149–390)
PMV BLD AUTO: 9.7 FL (ref 8.9–12.7)
POTASSIUM SERPL-SCNC: 3.6 MMOL/L (ref 3.5–5.3)
PR INTERVAL: 156 MS
PR INTERVAL: 164 MS
PROT UR STRIP-MCNC: NEGATIVE MG/DL
QRS AXIS: 59 DEGREES
QRS AXIS: 67 DEGREES
QRSD INTERVAL: 80 MS
QRSD INTERVAL: 82 MS
QT INTERVAL: 352 MS
QT INTERVAL: 364 MS
QTC INTERVAL: 390 MS
QTC INTERVAL: 403 MS
RBC # BLD AUTO: 5.06 MILLION/UL (ref 3.81–5.12)
SARS-COV-2 RNA RESP QL NAA+PROBE: NEGATIVE
SODIUM SERPL-SCNC: 139 MMOL/L (ref 135–147)
SP GR UR STRIP.AUTO: 1.01 (ref 1–1.03)
T WAVE AXIS: 31 DEGREES
T WAVE AXIS: 38 DEGREES
UROBILINOGEN UR QL STRIP.AUTO: 0.2 E.U./DL
VENTRICULAR RATE: 69 BPM
VENTRICULAR RATE: 79 BPM
WBC # BLD AUTO: 11.58 THOUSAND/UL (ref 4.31–10.16)

## 2022-09-07 PROCEDURE — 81003 URINALYSIS AUTO W/O SCOPE: CPT

## 2022-09-07 PROCEDURE — 83880 ASSAY OF NATRIURETIC PEPTIDE: CPT | Performed by: EMERGENCY MEDICINE

## 2022-09-07 PROCEDURE — 85025 COMPLETE CBC W/AUTO DIFF WBC: CPT | Performed by: EMERGENCY MEDICINE

## 2022-09-07 PROCEDURE — 93005 ELECTROCARDIOGRAM TRACING: CPT

## 2022-09-07 PROCEDURE — 36415 COLL VENOUS BLD VENIPUNCTURE: CPT | Performed by: EMERGENCY MEDICINE

## 2022-09-07 PROCEDURE — 99285 EMERGENCY DEPT VISIT HI MDM: CPT | Performed by: EMERGENCY MEDICINE

## 2022-09-07 PROCEDURE — U0003 INFECTIOUS AGENT DETECTION BY NUCLEIC ACID (DNA OR RNA); SEVERE ACUTE RESPIRATORY SYNDROME CORONAVIRUS 2 (SARS-COV-2) (CORONAVIRUS DISEASE [COVID-19]), AMPLIFIED PROBE TECHNIQUE, MAKING USE OF HIGH THROUGHPUT TECHNOLOGIES AS DESCRIBED BY CMS-2020-01-R: HCPCS | Performed by: EMERGENCY MEDICINE

## 2022-09-07 PROCEDURE — 93010 ELECTROCARDIOGRAM REPORT: CPT | Performed by: STUDENT IN AN ORGANIZED HEALTH CARE EDUCATION/TRAINING PROGRAM

## 2022-09-07 PROCEDURE — 80048 BASIC METABOLIC PNL TOTAL CA: CPT | Performed by: EMERGENCY MEDICINE

## 2022-09-07 PROCEDURE — 71045 X-RAY EXAM CHEST 1 VIEW: CPT

## 2022-09-07 PROCEDURE — U0005 INFEC AGEN DETEC AMPLI PROBE: HCPCS | Performed by: EMERGENCY MEDICINE

## 2022-09-07 PROCEDURE — 84484 ASSAY OF TROPONIN QUANT: CPT | Performed by: EMERGENCY MEDICINE

## 2022-09-07 PROCEDURE — 99284 EMERGENCY DEPT VISIT MOD MDM: CPT

## 2022-09-07 RX ORDER — FAMOTIDINE 20 MG/1
TABLET, FILM COATED ORAL
Qty: 60 TABLET | Refills: 0 | Status: SHIPPED | OUTPATIENT
Start: 2022-09-07

## 2022-09-07 NOTE — DISCHARGE INSTRUCTIONS
Start taking famotidine (pepcid) daily  You can start with 20mg daily and if still having any GI symptoms after 1-2 weeks, increase the dose to 20mg twice a day  Follow up with Gastroenterology for further evaluation if your symptoms continue      Return for any trouble breathing, persistent vomiting, fever more than 101, or for any concerns

## 2022-09-07 NOTE — Clinical Note
Trinidad Mendez was seen and treated in our emergency department on 9/7/2022  Diagnosis:     Alessandra Joe  may return to work on return date  She may return on this date: 09/08/2022         If you have any questions or concerns, please don't hesitate to call        Alethea Hurtado, DO    ______________________________           _______________          _______________  Hospital Representative                              Date                                Time

## 2022-09-07 NOTE — ED PROVIDER NOTES
History  Chief Complaint   Patient presents with    Chills     Pt reports cough at night, chills and on Monday had tingling down both arms that has gone away, upper back pain, Pt reports tylenol at 8am      67y F here for evaluation of multiple symptoms  Had Jannet in August and felt all of her symptoms had resolved  For the last few days having multiple waxing / waning symptoms  Denies fever, +episodic chills, no sig HA, but has had episodes of head "not feeling right" assoc w/ tingling / paresthesias to b/l UE/LE  No sig congestion/sore throat, +episodic cough - mostly clear sputum (occas trace yellow), intermittent exertional sob/jennings and substernal chest discomfort  Has had a separate episode of left side chest pain - sharper, but very brief  Has episodic heartburn / indigestion, but no abd pain  Episodic anorexia, no n/v, no changes in bms, no urinary complaints  Was changed from HCTZ to Norvasc 2 5mg - had some "low pressures" so stopped it, but then restarted while here w/ COVID  Stopped again after having b/l pedal edema - back on her HCTZ      History provided by:  Patient and medical records   used: No    Medical Problem  Location:  Mult complaints  Quality:  See above  Severity:  Mild  Onset quality:  Gradual  Timing:  Intermittent  Progression:  Waxing and waning  Chronicity:  New  Context:  Covid in august  Relieved by:  N/a  Worsened by:  N/a  Ineffective treatments:  N/a  Associated symptoms: chest pain, cough and shortness of breath    Associated symptoms: no fever, no headaches, no myalgias, no rash, no vomiting and no wheezing        Prior to Admission Medications   Prescriptions Last Dose Informant Patient Reported? Taking?    ALPRAZolam (XANAX) 0 25 mg tablet  Self Yes No   Sig: Take 1 tablet by mouth as needed   ASPIRIN 81 PO  Self Yes No   Sig: Take by mouth daily   Cholecalciferol (Vitamin D) 50 MCG ( UT) CAPS  Self Yes No   Si,000 Units daily   Latanoprostene Bunod (Vyzulta) 0 024 % SOLN   Yes No   Sig: Administer 1 drop to both eyes in the morning   Omega-3 Fatty Acids (FISH OIL PO)  Self Yes No   Sig: Take by mouth daily   amLODIPine (NORVASC) 2 5 mg tablet   Yes No   Sig: amlodipine 2 5 mg tablet   fluorouracil (EFUDEX) 5 % cream   Yes No   Sig: Apply 1 application topically in the morning   hydrochlorothiazide (HYDRODIURIL) 12 5 mg tablet  Self Yes No   Sig: Take 12 5 mg by mouth daily   Patient not taking: Reported on 8/24/2022   levocetirizine (XYZAL) 5 MG tablet   No No   Sig: Take 1 tablet (5 mg total) by mouth every evening   levothyroxine 25 mcg tablet  Self Yes No   Sig: Take 25 mcg by mouth daily 50mcg 4 days a week and 25 3days a week   lisinopril (ZESTRIL) 20 mg tablet  Self Yes No   Sig: Take 20 mg by mouth daily   montelukast (SINGULAIR) 10 mg tablet   No No   Sig: Take 1 tablet (10 mg total) by mouth daily at bedtime   nystatin-triamcinolone (MYCOLOG-II) cream  Self Yes No   Sig: Apply 1 application topically as needed     omeprazole (PriLOSEC) 40 MG capsule  Self No No   Sig: Take 1 capsule (40 mg total) by mouth daily   ondansetron (ZOFRAN-ODT) 4 mg disintegrating tablet  Self No No   Sig: Take 1 tablet (4 mg total) by mouth every 8 (eight) hours as needed for nausea or vomiting for up to 5 days   Patient not taking: Reported on 9/25/2019   simvastatin (ZOCOR) 20 mg tablet  Self Yes No   Sig: Take 20 mg by mouth daily at bedtime      Facility-Administered Medications: None       Past Medical History:   Diagnosis Date    Allergic rhinitis     Disease of thyroid gland     GERD (gastroesophageal reflux disease)     Glaucoma     Hypercholesteremia     Hypertension     Hypertension        Past Surgical History:   Procedure Laterality Date    HYSTERECTOMY      OOPHORECTOMY      TONSILLECTOMY  childhood       Family History   Problem Relation Age of Onset    Cancer Father     Colon cancer Father     Skin cancer Father     Diabetes Mother    Caprice Ghotra Heart attack Mother     Heart disease Mother     Hypertension Mother     Hypertension Sister     Prostate cancer Brother      I have reviewed and agree with the history as documented  E-Cigarette/Vaping    E-Cigarette Use Never User      E-Cigarette/Vaping Substances     Social History     Tobacco Use    Smoking status: Former Smoker     Quit date:      Years since quittin 6    Smokeless tobacco: Never Used   Vaping Use    Vaping Use: Never used   Substance Use Topics    Alcohol use: Yes     Comment: social     Drug use: No       Review of Systems   Constitutional: Negative for fever  Respiratory: Positive for cough and shortness of breath  Negative for wheezing  Cardiovascular: Positive for chest pain  Gastrointestinal: Negative for vomiting  Musculoskeletal: Negative for myalgias  Skin: Negative for rash  Neurological: Negative for headaches  All other systems reviewed and are negative  Physical Exam  Physical Exam  Vitals and nursing note reviewed  Constitutional:       Appearance: Normal appearance  HENT:      Head: Normocephalic and atraumatic  Right Ear: Tympanic membrane normal       Left Ear: Tympanic membrane normal       Nose: Nose normal    Eyes:      Conjunctiva/sclera: Conjunctivae normal       Pupils: Pupils are equal, round, and reactive to light  Cardiovascular:      Rate and Rhythm: Normal rate and regular rhythm  Pulmonary:      Effort: Pulmonary effort is normal       Breath sounds: Normal breath sounds  Abdominal:      Palpations: Abdomen is soft  Tenderness: There is no abdominal tenderness  Musculoskeletal:         General: No swelling  Cervical back: Normal range of motion  Skin:     General: Skin is warm  Neurological:      General: No focal deficit present  Mental Status: She is alert and oriented to person, place, and time     Psychiatric:         Mood and Affect: Mood normal          Vital Signs  ED Triage Vitals [09/07/22 1303]   Temperature Pulse Respirations Blood Pressure SpO2   97 8 °F (36 6 °C) 86 18 (!) 190/80 100 %      Temp Source Heart Rate Source Patient Position - Orthostatic VS BP Location FiO2 (%)   Oral Monitor Sitting Right arm --      Pain Score       --           Vitals:    09/07/22 1303 09/07/22 1634   BP: (!) 190/80 135/63   Pulse: 86 73   Patient Position - Orthostatic VS: Sitting          Visual Acuity      ED Medications  Medications - No data to display    Diagnostic Studies  Results Reviewed     Procedure Component Value Units Date/Time    HS Troponin I 2hr [[id number]]  (Normal) Collected: 09/07/22 1633    Lab Status: Final result Specimen: Blood from Arm, Left Updated: 09/07/22 1708     hs TnI 2hr 3 ng/L      Delta 2hr hsTnI 0 ng/L     HS Troponin I 4hr [[id number]]     Lab Status: No result Specimen: Blood     HS Troponin 0hr (reflex protocol) [[id number]]  (Normal) Collected: 09/07/22 1417    Lab Status: Final result Specimen: Blood from Arm, Left Updated: 09/07/22 1501     hs TnI 0hr 3 ng/L     COVID only [[id number]]  (Normal) Collected: 09/07/22 1353    Lab Status: Final result Specimen: Nares from Nose Updated: 09/07/22 1455     SARS-CoV-2 Negative    Narrative:      FOR PEDIATRIC PATIENTS - copy/paste COVID Guidelines URL to browser: [email or web address] [email or web address]  ashx    SARS-CoV-2 assay is a Nucleic Acid Amplification assay intended for the  qualitative detection of nucleic acid from SARS-CoV-2 in nasopharyngeal  swabs  Results are for the presumptive identification of SARS-CoV-2 RNA  Positive results are indicative of infection with SARS-CoV-2, the virus  causing COVID-19, but do not rule out bacterial infection or co-infection  with other viruses  Laboratories within the [place] and its  territories are required to report all positive results to the appropriate  public health authorities   Negative results do not preclude SARS-CoV-2  infection and should not be used as the sole basis for treatment or other  patient management decisions  Negative results must be combined with  clinical observations, patient history, and epidemiological information  This test has not been FDA cleared or approved  This test has been authorized by FDA under an Emergency Use Authorization  (EUA)  This test is only authorized for the duration of time the  declaration that circumstances exist justifying the authorization of the  emergency use of an in vitro diagnostic tests for detection of SARS-CoV-2  virus and/or diagnosis of COVID-19 infection under section 564(b)(1) of  the Act, 21 U  S C  769UNW-2(K)(8), unless the authorization is terminated  or revoked sooner  The test has been validated but independent review by FDA  and CLIA is pending  Test performed using [employer] GeneXpert: This RT-PCR assay targets N2,  a region unique to SARS-CoV-2  A conserved region in the E-gene was chosen  for pan-Sarbecovirus detection which includes SARS-CoV-2      Basic metabolic panel [[id number]]  (Abnormal) Collected: 09/07/22 1353    Lab Status: Final result Specimen: Blood from Arm, Left Updated: 09/07/22 1436     Sodium 139 mmol/L      Potassium 3 6 mmol/L      Chloride 102 mmol/L      CO2 27 mmol/L      ANION GAP 10 mmol/L      BUN 17 mg/dL      Creatinine 1 00 mg/dL      Glucose 107 mg/dL      Calcium 11 1 mg/dL      eGFR 57 ml/min/1 73sq m     Narrative:      [place] guidelines for Chronic Kidney Disease (CKD):     Stage 1 with normal or high GFR (GFR > 90 mL/min/1 73 square meters)    Stage 2 Mild CKD (GFR = 60-89 mL/min/1 73 square meters)    Stage 3A Moderate CKD (GFR = 45-59 mL/min/1 73 square meters)    Stage 3B Moderate CKD (GFR = 30-44 mL/min/1 73 square meters)    Stage 4 Severe CKD (GFR = 15-29 mL/min/1 73 square meters)    Stage 5 End Stage CKD (GFR <15 mL/min/1 73 square meters)  Note: GFR calculation is accurate only with a steady state creatinine    NT-BNP PRO [[id number]]  (Normal) Collected: 09/07/22 1353    Lab Status: Final result Specimen: Blood from Arm, Left Updated: 09/07/22 1436     NT-proBNP 64 pg/mL     CBC and differential [[id number]]  (Abnormal) Collected: 09/07/22 1353    Lab Status: Final result Specimen: Blood from Arm, Left Updated: 09/07/22 1405     WBC 11 58 Thousand/uL      RBC 5 06 Million/uL      Hemoglobin 15 1 g/dL      Hematocrit 47 8 %      MCV 95 fL      MCH 29 8 pg      MCHC 31 6 g/dL      RDW 12 6 %      MPV 9 7 fL      Platelets 345 Thousands/uL      nRBC 0 /100 WBCs      Neutrophils Relative 74 %      Immat GRANS % 0 %      Lymphocytes Relative 19 %      Monocytes Relative 5 %      Eosinophils Relative 2 %      Basophils Relative 0 %      Neutrophils Absolute 8 51 Thousands/µL      Immature Grans Absolute 0 04 Thousand/uL      Lymphocytes Absolute 2 17 Thousands/µL      Monocytes Absolute 0 59 Thousand/µL      Eosinophils Absolute 0 22 Thousand/µL      Basophils Absolute 0 05 Thousands/µL     Urine Macroscopic, POC [[id number]] Collected: 09/07/22 1356    Lab Status: Final result Specimen: Urine Updated: 09/07/22 1358     Color, UA Yellow     Clarity, UA Clear     pH, UA 5 5     Leukocytes, UA Negative     Nitrite, UA Negative     Protein, UA Negative mg/dl      Glucose, UA Negative mg/dl      Ketones, UA Negative mg/dl      Urobilinogen, UA 0 2 E U /dl      Bilirubin, UA Negative     Occult Blood, UA Negative     Specific Gravity, UA 1 010    Narrative:      CLINITEK RESULT                 XR chest 1 view portable   ED Interpretation by Johanny Ramos DO (09/07 5734)   See below      Final Result by Eusebia Reynolds MD (09/07 1411)      No acute cardiopulmonary disease        Findings are stable            Workstation performed: [id number]                    Procedures  ECG 12 Lead Documentation Only    Date/Time: 9/7/2022 1:55 PM  Performed by: Johanny Ramos DO  Authorized by: Johanny Ramos DO Indications / Diagnosis:  Episodic pain  ECG reviewed by me, the ED Provider: yes    Patient location:  ED  Previous ECG:     Previous ECG:  Compared to current  Interpretation:     Interpretation: non-specific    Rate:     ECG rate:  79    ECG rate assessment: normal    Rhythm:     Rhythm: sinus rhythm    Ectopy:     Ectopy: none    QRS:     QRS axis:  Normal  ST segments:     ST segments:  Non-specific  T waves:     T waves: non-specific      ECG 12 Lead Documentation Only    Date/Time: 9/7/2022 4:43 PM  Performed by: Sandy Welch DO  Authorized by: Sandy Welch DO     Indications / Diagnosis:  Delta  ECG reviewed by me, the ED Provider: no    Patient location:  ED  Previous ECG:     Previous ECG:  Compared to current    Similarity:  No change  Interpretation:     Interpretation: non-specific    Rate:     ECG rate:  69    ECG rate assessment: normal    Rhythm:     Rhythm: sinus rhythm    Ectopy:     Ectopy: none    QRS:     QRS axis:  Normal  ST segments:     ST segments:  Non-specific  T waves:     T waves: non-specific               ED Course  ED Course as of 09/07/22 1728   Wed Sep 07, 2022   1614 D/w pt lab/rad    Will get 2nd trop   1709 Delta 2hr hsTnI: 0  Will d/c home                                             MDM  Number of Diagnoses or Management Options  GERD (gastroesophageal reflux disease): new and requires workup  Non-cardiac chest pain: new and requires workup  Diagnosis management comments: Multiple complaints with wax/wane symptoms - ddx broad includes/not limited to viral syndrome, gerd, atypical acs/anginal equivalents, anxiety - will ck labs, covid, cxr, ekg, trop and re-eval       Amount and/or Complexity of Data Reviewed  Clinical lab tests: reviewed and ordered  Tests in the radiology section of CPT®: reviewed and ordered  Tests in the medicine section of CPT®: reviewed and ordered  Independent visualization of images, tracings, or specimens: yes        Disposition  Final diagnoses:   Non-cardiac chest pain   GERD (gastroesophageal reflux disease)     Time reflects when diagnosis was documented in both MDM as applicable and the Disposition within this note     Time User Action Codes Description Comment    9/7/2022  5:12 PM Elizabeth Anglin [R07 [id number]] Non-cardiac chest pain     9/7/2022  5:12 PM Elizabeth Anglin [K21 [id number]] GERD (gastroesophageal reflux disease)       ED Disposition     ED Disposition   Discharge    Condition   Stable    Date/Time   Wed Sep 7, 2022  5:12 PM    Comment   Dave Hines discharge to home/self care  Follow-up Information     Follow up With Specialties Details Why Yanely Santiago MD Family Medicine Schedule an appointment as soon as possible for a visit  If symptoms worsen or if no improvement [address]  [phone]            Patient's Medications   Discharge Prescriptions    FAMOTIDINE (PEPCID) 20 MG TABLET    Start taking 20mg daily  If still having symptoms after 2 weeks, increase dose to 20mg bid  Start Date: 9/7/2022  End Date: --       Order Dose: --       Quantity: 60 tablet    Refills: 0       No discharge procedures on file      PDMP Review     None          ED Provider  Electronically Signed by           Lalo Culver DO  09/07/22 3417

## 2022-09-08 ENCOUNTER — RA CDI HCC (OUTPATIENT)
Dept: OTHER | Facility: HOSPITAL | Age: 67
End: 2022-09-08

## 2022-09-08 NOTE — PROGRESS NOTES
[facility]  coding opportunities       Chart reviewed, no opportunity found: CHART REVIEWED, NO OPPORTUNITY FOUND        Patients Insurance        Commercial Insurance: [address] [place] [address]

## 2022-09-15 ENCOUNTER — OFFICE VISIT (OUTPATIENT)
Dept: FAMILY MEDICINE CLINIC | Facility: CLINIC | Age: 67
End: 2022-09-15
Payer: COMMERCIAL

## 2022-09-15 VITALS
BODY MASS INDEX: 33.42 KG/M2 | HEART RATE: 97 BPM | OXYGEN SATURATION: 96 % | WEIGHT: 200.6 LBS | DIASTOLIC BLOOD PRESSURE: 78 MMHG | SYSTOLIC BLOOD PRESSURE: 142 MMHG | HEIGHT: 65 IN | TEMPERATURE: 98 F

## 2022-09-15 DIAGNOSIS — E83.52 SERUM CALCIUM ELEVATED: ICD-10-CM

## 2022-09-15 DIAGNOSIS — Z13.1 SCREENING FOR DIABETES MELLITUS: ICD-10-CM

## 2022-09-15 DIAGNOSIS — Z76.89 ENCOUNTER TO ESTABLISH CARE: ICD-10-CM

## 2022-09-15 DIAGNOSIS — E03.9 HYPOTHYROIDISM, UNSPECIFIED TYPE: ICD-10-CM

## 2022-09-15 DIAGNOSIS — I10 ESSENTIAL (PRIMARY) HYPERTENSION: Primary | ICD-10-CM

## 2022-09-15 DIAGNOSIS — Z78.0 POST-MENOPAUSAL: ICD-10-CM

## 2022-09-15 DIAGNOSIS — Z13.820 SCREENING FOR OSTEOPOROSIS: ICD-10-CM

## 2022-09-15 DIAGNOSIS — D72.829 LEUKOCYTOSIS, UNSPECIFIED TYPE: ICD-10-CM

## 2022-09-15 DIAGNOSIS — Z87.891 HISTORY OF TOBACCO ABUSE: ICD-10-CM

## 2022-09-15 DIAGNOSIS — E78.5 HYPERLIPIDEMIA, UNSPECIFIED HYPERLIPIDEMIA TYPE: ICD-10-CM

## 2022-09-15 DIAGNOSIS — I34.0 NONRHEUMATIC MITRAL (VALVE) INSUFFICIENCY: ICD-10-CM

## 2022-09-15 PROCEDURE — 3725F SCREEN DEPRESSION PERFORMED: CPT | Performed by: NURSE PRACTITIONER

## 2022-09-15 PROCEDURE — 99203 OFFICE O/P NEW LOW 30 MIN: CPT | Performed by: NURSE PRACTITIONER

## 2022-09-15 RX ORDER — KETOCONAZOLE 20 MG/G
CREAM TOPICAL DAILY
COMMUNITY

## 2022-09-15 NOTE — PROGRESS NOTES
[facility]    ASSESSMENT AND PLAN     1  Essential (primary) hypertension  Assessment & Plan:  /78 today  Compliant with Lisinopril/HCTZ  But does admit to not taking her HCTZ yet today-this is unusual, she is normally on time and compliant  Notes her home BP to average 110 -120/ 60s-70s  Fasting lab work ordered today as below  She will complete within the next few weeks, and return for an annual physical and review at that time  She will bring her home pressure cuff with her for comparison, we will recheck her BP then  Dose adjust medications at that time if indicated  Orders:  -     Comprehensive metabolic panel; Future    2  Post-menopausal  -     DXA bone density spine hip and pelvis; Future; Expected date: 09/15/2022    3  Screening for osteoporosis  -     DXA bone density spine hip and pelvis; Future; Expected date: 09/15/2022    4  Hypothyroidism, unspecified type  Assessment & Plan:    Component Ref Range & Units 4/18/22 11:38 AM 12/30/21  8:34 AM 5/11/21  9:44 AM 1/15/21 10:24 AM 5/15/20  9:54 AM 11/11/19  9:29 AM 7/31/19  5:30 PM   TSH 3RD GENERATON 0 450 - 4 500 uIU/mL 1 900  4 750 High  R, CM  3 440 R, CM  3 080 R, CM  2 870 R, CM  1 390 R, CM  4 272 High  R, CM      Continue levothyroxine 50/25 alternating      5  Hyperlipidemia, unspecified hyperlipidemia type  Assessment & Plan:  Stopped statin in May  Was on simvastatin 20  Last lipid panel:  167/227/43/79  Recheck fasting today  Will restart statin dose accordingly      Orders:  -     Lipid panel; Future    6  Nonrheumatic mitral (valve) insufficiency  Assessment & Plan:  Noted in history  Records unavailable at this time to review  In light of recent ER visit, although symptoms suspected to be GERD, will get echo to assess cardiac structure  Consider stress and or referral to Cardiology if indicated  EKG: Sinus rhythm with marked sinus arrhythmia  Note she has had no further symptoms since discharge    ER precautions with any acute change prior    Orders:  -     Echo complete w/ contrast if indicated; Future; Expected date: 09/15/2022    7  Screening for diabetes mellitus  -     Comprehensive metabolic panel; Future    8  Serum calcium elevated  Assessment & Plan:  Calcium 8 3 - 10 1 mg/dL 11 1 High   10 6 High   10 6 High   10 5 High   10 3 High   10 7 High   9 6      Calcium trends reviewed back to 2019  Does not take a calcium supplement  Consider secondary to HCTZ-although it is a very low dose  Follow-up lab work ordered  Consider thyroid imaging: PTH; referral to endo  9  Leukocytosis, unspecified type  Assessment & Plan:  White count in recent our visit:  11 5  Absolute neutrophils 8 5  Suspect viral   Will recheck CBC    Orders:  -     CBC and differential; Future    10  History of tobacco abuse  Assessment & Plan:  20 5 pack per year times 40 years  Quit 8 years ago  Admittedly vaped for short time after quitting  Nothing in years  Strongly encourage lung screen CT per guidelines  Patient hesitant at this time  Note Abdomenal/pelvis CT 2019: Incidentally noted multiple subcentimeter pulmonary nodules in visualized lungs measuring up to 6 mm in average axial dimension  Based on current Fleischner Society 2017 Guidelines on incidental pulmonary nodule, followup non-contrast CT is   recommended at 6-12 months from the initial examination and, if stable at that time, an additional followup is recommended for 18-24 months from the initial examination  Recommend obtaining baseline chest CT and further follow-up per described guideline     Note:  Patient with significant anxiety when discussing a lung CT scan  Will discuss and provide further education at next follow-up in 2 weeks  11  Encounter to establish care  Comments:  Establishing primary care in our office         SUBJECTIVE       Patient ID: Josee Brush is a 69 y o  female      Chief Complaint   Patient presents with  Establish Care     Wants to speak about bp management, gerd diagnosed in ER put on pepcid 20mg-bid       HISTORY OF PRESENT ILLNESS    Presents to establish primary care in our office  Transitioning from Dr Emilia Cassidy is retiring  Recently evaluated in the ER for nonspecific chest pain  Diagnosed with GERD, and has a follow-up with GI for tomorrow  Chronic conditions:  Hypertension, hyperlipidemia, allergies, hypothyroidism and healthcare anxiety  The following portions of the patient's history were reviewed and updated as appropriate: allergies, current medications, past family history, past medical history, past social history, past surgical history, and problem list     REVIEW OF SYSTEMS  Review of Systems   Constitutional: Negative  HENT: Negative  Respiratory: Negative  Cardiovascular: Negative  Gastrointestinal: Negative  Genitourinary: Negative  Musculoskeletal: Negative  Skin:        Follows with Dermatology yearly   Neurological: Negative  Psychiatric/Behavioral: Negative  OBJECTIVE      VITAL SIGNS  /78 (BP Location: Right arm, Patient Position: Sitting, Cuff Size: Standard)   Pulse 97   Temp 98 °F (36 7 °C) (Temporal)   Ht 5' 4 9" (1 648 m)   Wt 91 kg (200 lb 9 6 oz)   SpO2 96%   BMI 33 48 kg/m²     CURRENT MEDICATIONS    Current Outpatient Medications:     ALPRAZolam (XANAX) 0 25 mg tablet, Take 1 tablet by mouth as needed, Disp: , Rfl:     ASPIRIN 81 PO, Take by mouth daily, Disp: , Rfl:     Cholecalciferol (Vitamin D) 50 MCG (2000 UT) CAPS, 2,000 Units daily, Disp: , Rfl:     famotidine (PEPCID) 20 mg tablet, Start taking 20mg daily   If still having symptoms after 2 weeks, increase dose to 20mg bid , Disp: 60 tablet, Rfl: 0    fluorouracil (EFUDEX) 5 % cream, Apply 1 application topically in the morning, Disp: , Rfl:     hydrochlorothiazide (HYDRODIURIL) 12 5 mg tablet, Take 12 5 mg by mouth daily, Disp: , Rfl:     ketoconazole (NIZORAL) 2 % cream, Apply topically daily Athletes foot, Disp: , Rfl:     Latanoprostene Bunod (Vyzulta) 0 024 % SOLN, Administer 1 drop to both eyes in the morning, Disp: , Rfl:     levocetirizine (XYZAL) 5 MG tablet, Take 1 tablet (5 mg total) by mouth every evening, Disp: 90 tablet, Rfl: 3    levothyroxine 25 mcg tablet, Take 25 mcg by mouth daily 50mcg 4 days a week and 25 3days a week, Disp: , Rfl:     lisinopril (ZESTRIL) 20 mg tablet, Take 20 mg by mouth daily, Disp: , Rfl:     montelukast (SINGULAIR) 10 mg tablet, Take 1 tablet (10 mg total) by mouth daily at bedtime, Disp: 90 tablet, Rfl: 3    Omega-3 Fatty Acids (FISH OIL PO), Take by mouth daily, Disp: , Rfl:     nystatin-triamcinolone (MYCOLOG-II) cream, Apply 1 application topically as needed   (Patient not taking: Reported on 9/15/2022), Disp: , Rfl:     simvastatin (ZOCOR) 20 mg tablet, Take 20 mg by mouth daily at bedtime (Patient not taking: Reported on 9/15/2022), Disp: , Rfl:       PHYSICAL EXAMINATION   Physical Exam  Vitals and nursing note reviewed  Constitutional:       General: She is not in acute distress  Appearance: Normal appearance  She is not ill-appearing  HENT:      Head: Normocephalic  Cardiovascular:      Rate and Rhythm: Normal rate and regular rhythm  Heart sounds: Normal heart sounds  Pulmonary:      Effort: Pulmonary effort is normal  No respiratory distress  Breath sounds: Normal breath sounds and air entry  Musculoskeletal:      Right lower leg: No edema  Left lower leg: No edema  Skin:     General: Skin is warm and dry  Comments: scattered nevi/freckles   Neurological:      General: No focal deficit present  Mental Status: She is alert and oriented to person, place, and time     Psychiatric:         Attention and Perception: Attention normal          Mood and Affect: Mood normal          Behavior: Behavior normal

## 2022-09-16 ENCOUNTER — OFFICE VISIT (OUTPATIENT)
Dept: GASTROENTEROLOGY | Facility: CLINIC | Age: 67
End: 2022-09-16
Payer: COMMERCIAL

## 2022-09-16 ENCOUNTER — TELEPHONE (OUTPATIENT)
Dept: ADMINISTRATIVE | Facility: OTHER | Age: 67
End: 2022-09-16

## 2022-09-16 VITALS
HEIGHT: 65 IN | SYSTOLIC BLOOD PRESSURE: 120 MMHG | DIASTOLIC BLOOD PRESSURE: 84 MMHG | BODY MASS INDEX: 33.52 KG/M2 | WEIGHT: 201.2 LBS | OXYGEN SATURATION: 99 % | HEART RATE: 68 BPM | TEMPERATURE: 98.1 F

## 2022-09-16 DIAGNOSIS — R10.13 EPIGASTRIC PAIN: Primary | ICD-10-CM

## 2022-09-16 PROCEDURE — 99203 OFFICE O/P NEW LOW 30 MIN: CPT | Performed by: INTERNAL MEDICINE

## 2022-09-16 PROCEDURE — 1160F RVW MEDS BY RX/DR IN RCRD: CPT | Performed by: INTERNAL MEDICINE

## 2022-09-16 NOTE — PROGRESS NOTES
She has had GERD for years with dietary triggers  Was taking omeprazole 40 years ago but not helping much and stopped  THen stopped, then intermittent famotidine  Recently, had COVID in July, with loss of smell, taste, nausea, diarrhea, a lot of GI symptoms, poor appetite  Was taking famotidine  She was also taking amlodipine for BP ;  She then developed leg edema and nausea and poor appetite and stopped amlodipine  But a few days later, she developed epigastric discomfort radiating to the back  Went to the ER, ruled out for MI  Then pain returned and radiated to the shoulders  Feels fine now  Some burping  Avoids spicy foods  She takes famotidien 20 mg bid  No dysphagia  No nausea or vomiting  Cousin with gastric cancer  Regular BMs  Father with CRC  Last colonoscopy 6/2019, no polyps  Due in 2024        normal exam    EGD years ago for anemia

## 2022-09-16 NOTE — LETTER
Procedure Request Form: Colonoscopy and CT Colonography      Date Requested: 22  Patient: Mili Barreto  Patient : 1955   Referring Provider: MILLY Muller        Date of Procedure ______________________________       The above patient has informed us that they have completed their   most recent Colonoscopy and CT Colonography at your facility  Please complete   this form and attach all corresponding procedure reports/results  Comments ___Dr Lapos _______________________________________________________  ____________________________________________________________________  ____________________________________________________________________  ____________________________________________________________________    Facility Completing Procedure _________________________________________    Form Completed By (print name) _______________________________________      Signature __________________________________________________________      These reports are needed for  compliance  Please fax this completed form and a copy of the procedure report to our office located at [place] [address] as soon as possible to [phone] attention [place]: Phone [phone]    We thank you for your assistance in treating our mutual patient

## 2022-09-16 NOTE — LETTER
Procedure Request Form: CT Colonography      Date Requested: 22  Patient: Beau Flower  Patient : 1955   Referring Provider: Fish Si, CRNP        Date of Procedure ______________________________       The above patient has informed us that they have completed their   most recent CT Colonography at your facility  Please complete   this form and attach all corresponding procedure reports/results  Comments _Dr  Latanya Helm _________________________________________________________  ____________________________________________________________________  ____________________________________________________________________  ____________________________________________________________________    Facility Completing Procedure _________________________________________    Form Completed By (print name) _______________________________________      Signature __________________________________________________________      These reports are needed for  compliance  Please fax this completed form and a copy of the procedure report to our office located at [place] [address] as soon as possible to [phone] sj Nelson: Phone [phone]    We thank you for your assistance in treating our mutual patient

## 2022-09-16 NOTE — TELEPHONE ENCOUNTER
----- Message from Michael Palmer LPN sent at 2/44/0782  5:26 PM EDT -----  Regarding: Quality Outreach  09/15/22 5:26 PM    Hello, our patient Jeff Oconnor has had Mammogram completed/performed  Please assist in updating the patient chart by making an External outreach to [facility]   The date of service is 10/2021    Thank you,  Michael Palmer LPN  [facility]

## 2022-09-16 NOTE — ASSESSMENT & PLAN NOTE
/78 today  Compliant with Lisinopril/HCTZ  But does admit to not taking her HCTZ yet today-this is unusual, she is normally on time and compliant  Notes her home BP to average 110 -120/ 60s-70s  Fasting lab work ordered today as below  She will complete within the next few weeks, and return for an annual physical and review at that time  She will bring her home pressure cuff with her for comparison, we will recheck her BP then  Dose adjust medications at that time if indicated

## 2022-09-16 NOTE — ASSESSMENT & PLAN NOTE
Component Ref Range & Units 4/18/22 11:38 AM 12/30/21  8:34 AM 5/11/21  9:44 AM 1/15/21 10:24 AM 5/15/20  9:54 AM 11/11/19  9:29 AM 7/31/19  5:30 PM   TSH 3RD GENERATON 0 450 - 4 500 uIU/mL 1 900  4 750 High  R, CM  3 440 R, CM  3 080 R, CM  2 870 R, CM  1 390 R, CM  4 272 High  R, CM      Continue levothyroxine 50/25 alternating

## 2022-09-16 NOTE — ASSESSMENT & PLAN NOTE
Stopped statin in May  Was on simvastatin 20  Last lipid panel:  167/227/43/79  Recheck fasting today    Will restart statin dose accordingly

## 2022-09-16 NOTE — TELEPHONE ENCOUNTER
Upon review of the In Basket request we were able to locate, review, and update the patient chart as requested for Mammogram     Any additional questions or concerns should be emailed to the Practice Liaisons via [email or web address] email, please do not reply via In Basket      Thank you  Margo Newby

## 2022-09-16 NOTE — TELEPHONE ENCOUNTER
Upon review of the In Basket request and the patient's chart, initial outreach has been made via fax, please see Contacts section for details        Att x1 CT colo    Thank you  Maricruz Dominguez

## 2022-09-16 NOTE — ASSESSMENT & PLAN NOTE
Noted in history  Records unavailable at this time to review  In light of recent ER visit, although symptoms suspected to be GERD, will get echo to assess cardiac structure  Consider stress and or referral to Cardiology if indicated  EKG: Sinus rhythm with marked sinus arrhythmia  Note she has had no further symptoms since discharge    ER precautions with any acute change prior

## 2022-09-16 NOTE — TELEPHONE ENCOUNTER
----- Message from Aaliyah Elder LPN sent at 9/70/0121  5:23 PM EDT -----  Regarding: Quality Outreach  09/15/22 5:23 PM    Hello, our patient Keven Ao has had CT Colonography completed/performed  Please assist in updating the patient chart by making an External outreach to [facility],   facility located in [place], [place]  Provider reported from pt as Dr Jose Claudio, phone number- [phone]        Thank you,  Aaliyah Elder LPN  [facility]

## 2022-09-16 NOTE — ASSESSMENT & PLAN NOTE
Calcium 8 3 - 10 1 mg/dL 11 1 High   10 6 High   10 6 High   10 5 High   10 3 High   10 7 High   9 6      Calcium trends reviewed back to 2019  Does not take a calcium supplement  Consider secondary to HCTZ-although it is a very low dose  Follow-up lab work ordered  Consider thyroid imaging: PTH; referral to endo

## 2022-09-16 NOTE — ASSESSMENT & PLAN NOTE
20 5 pack per year times 40 years  Quit 8 years ago  Admittedly vaped for short time after quitting  Nothing in years  Strongly encourage lung screen CT per guidelines  Patient hesitant at this time  Note Abdomenal/pelvis CT 2019: Incidentally noted multiple subcentimeter pulmonary nodules in visualized lungs measuring up to 6 mm in average axial dimension  Based on current Fleischner Society 2017 Guidelines on incidental pulmonary nodule, followup non-contrast CT is   recommended at 6-12 months from the initial examination and, if stable at that time, an additional followup is recommended for 18-24 months from the initial examination  Recommend obtaining baseline chest CT and further follow-up per described guideline     Note:  Patient with significant anxiety when discussing a lung CT scan  Will discuss and provide further education at next follow-up in 2 weeks

## 2022-09-16 NOTE — PROGRESS NOTES
[facility] - Outpatient Consultation  Beau Flower 69 y o  female MRN: [id number]  Encounter: [id number]    HPI:    Baeu Flower is a 69 y o  female who presents with longstanding GERD and an episode of epigastric pain radiating to the back  She has had GERD for years and is aware of specific dietary triggers  She used to take omeprazole 40 mg daily for several years but then switched to p r n  famotidine which has been working fairly well for her  In July, she had COVID infection with multiple GI symptoms including loss of smell and taste, nausea, diarrhea, decreased appetite, abdominal pain  She continued to take famotidine  She eventually recovered  Around the same time she started taking amlodipine for blood pressure which caused leg edema and nausea, so she discontinued it  A few days later, she developed epigastric pain radiating to the back and went to the emergency room  She had EKG, troponin tests chest x-ray, which were normal   She was then advised to start taking famotidine 20 mg twice daily as a standing medication  Several days later, she had recurrence of the epigastric pain which now radiated to her shoulders, but then subsided  Currently, she feels fine  She is taking famotidine 20 mg twice daily and avoiding spicy foods  She is not having epigastric pain anymore  There is no dysphagia  No nausea or vomiting  Occasional burping  She is up-to-date with colon cancer screening and her last colonoscopy was in 2019  Her father had colorectal cancer, so she gets colonoscopies every 5 years  Bowel movements are regular  Cousin with gastric cancer  She has not had recent EGD  Labs from September with normal CBC and metabolic panel  REVIEW OF SYSTEMS:  CONSTITUTIONAL: Denies any fever, chills, rigors, and weight loss  HEENT: No earache or tinnitus  Denies hearing loss or visual disturbances    CARDIOVASCULAR: No chest pain or palpitations  RESPIRATORY: Denies any cough, hemoptysis, shortness of breath or dyspnea on exertion  GASTROINTESTINAL: As noted in the History of Present Illness  GENITOURINARY: No problems with urination  Denies any hematuria or dysuria  NEUROLOGIC: No dizziness or vertigo, denies headaches  MUSCULOSKELETAL: Denies any muscle or joint pain  SKIN: Denies skin rashes or itching  ENDOCRINE: Denies excessive thirst  Denies intolerance to heat or cold  PSYCHOSOCIAL: Denies depression or anxiety  Denies any recent memory loss       Historical Information   Past Medical History:   Diagnosis Date    Allergic rhinitis     Disease of thyroid gland     GERD (gastroesophageal reflux disease)     Glaucoma     Hypercholesteremia     Hypertension     Hypertension      Past Surgical History:   Procedure Laterality Date    HYSTERECTOMY      OOPHORECTOMY      TONSILLECTOMY  childhood     Social History   Social History     Substance and Sexual Activity   Alcohol Use Yes    Comment: social      Social History     Substance and Sexual Activity   Drug Use No     Social History     Tobacco Use   Smoking Status Former Smoker    Packs/day: 0 50    Years: 41 00    Pack years: 20 50    Types: Cigarettes    Start date:     Quit date:     Years since quittin 7   Smokeless Tobacco Never Used     Family History   Problem Relation Age of Onset   [facility] Cancer Father     Colon cancer Father     Skin cancer Father     Diabetes Mother     Heart attack Mother     Heart disease Mother     Hypertension Mother     Hypertension Sister     Prostate cancer Brother        Meds/Allergies       Current Outpatient Medications:     ALPRAZolam (XANAX) 0 25 mg tablet    ASPIRIN 81 PO    Cholecalciferol (Vitamin D) 50 MCG (2000) CAPS    famotidine (PEPCID) 20 mg tablet    fluorouracil (EFUDEX) 5 % cream    hydrochlorothiazide (HYDRODIURIL) 12 5 mg tablet    ketoconazole (NIZORAL) 2 % cream    Latanoprostene Bunod (Vyzulta) 0 024 % SOLN    levocetirizine (XYZAL) 5 MG tablet    levothyroxine 25 mcg tablet    lisinopril (ZESTRIL) 20 mg tablet    montelukast (SINGULAIR) 10 mg tablet    Omega-3 Fatty Acids (FISH OIL PO)    nystatin-triamcinolone (MYCOLOG-II) cream    simvastatin (ZOCOR) 20 mg tablet    Allergies   Allergen Reactions    Doxycycline GI Intolerance    Erythromycin GI Intolerance     Sensitivity to medication    sensitivity  Sensitivity to medication  sensitivity      Norvasc [Amlodipine] Swelling       Objective   Blood pressure 120/84, pulse 68, temperature 98 1 °F (36 7 °C), temperature source Tympanic, height 5' 4 9" (1 648 m), weight 91 3 kg (201 lb 3 2 oz), SpO2 99 %  Body mass index is 33 58 kg/m²  PHYSICAL EXAM:    General Appearance:   Alert, cooperative, no distress   HEENT:   Normocephalic, atraumatic, anicteric  Neck:  Supple, symmetrical, trachea midline   Lungs:   Clear to auscultation bilaterally; no rales, rhonchi or wheezing; respirations unlabored    Heart[de-identified]   Regular rate and rhythm; no murmur, rub, or gallop  Abdomen:   Soft, non-tender, non-distended; normal bowel sounds; no masses, no organomegaly    Genitalia:   Deferred    Rectal:   Deferred    Extremities:  No cyanosis, clubbing or edema    Pulses:  2+ and symmetric    Skin:  No jaundice, rashes, or lesions    Lymph nodes:  No palpable cervical lymphadenopathy        Lab Results:   No visits with results within 1 Day(s) from this visit     Latest known visit with results is:   Admission on 09/07/2022, Discharged on 09/07/2022   Component Date Value    WBC 09/07/2022 11 58 (A)    RBC 09/07/2022 5 06     Hemoglobin 09/07/2022 15 1     Hematocrit 09/07/2022 47 8 (A)    MCV 09/07/2022 95     MCH 09/07/2022 29 8     MCHC 09/07/2022 31 6     RDW 09/07/2022 12 6     MPV 09/07/2022 9 7     Platelets 10/33/2256 299     nRBC 09/07/2022 0     Neutrophils Relative 09/07/2022 74     Immat GRANS % 09/07/2022 0     Lymphocytes Relative 09/07/2022 19     Monocytes Relative 09/07/2022 5     Eosinophils Relative 09/07/2022 2     Basophils Relative 09/07/2022 0     Neutrophils Absolute 09/07/2022 8 51 (A)    Immature Grans Absolute 09/07/2022 0 04     Lymphocytes Absolute 09/07/2022 2 17     Monocytes Absolute 09/07/2022 0 59     Eosinophils Absolute 09/07/2022 0 22     Basophils Absolute 09/07/2022 0 05     Sodium 09/07/2022 139     Potassium 09/07/2022 3 6     Chloride 09/07/2022 102     CO2 09/07/2022 27     ANION GAP 09/07/2022 10     BUN 09/07/2022 17     Creatinine 09/07/2022 1 00     Glucose 09/07/2022 107     Calcium 09/07/2022 11 1 (A)    eGFR 09/07/2022 57     SARS-CoV-2 09/07/2022 Negative     Ventricular Rate 09/07/2022 79     Atrial Rate 09/07/2022 79     NM Interval 09/07/2022 156     QRSD Interval 09/07/2022 80     QT Interval 09/07/2022 352     QTC Interval 09/07/2022 403     P Axis 09/07/2022 61     QRS Axis 09/07/2022 59     T Wave Axis 09/07/2022 38     Color, UA 09/07/2022 Yellow     Clarity, UA 09/07/2022 Clear     pH, UA 09/07/2022 5 5     Leukocytes, UA 09/07/2022 Negative     Nitrite, UA 09/07/2022 Negative     Protein, UA 09/07/2022 Negative     Glucose, UA 09/07/2022 Negative     Ketones, UA 09/07/2022 Negative     Urobilinogen, UA 09/07/2022 0 2     Bilirubin, UA 09/07/2022 Negative     Occult Blood, UA 09/07/2022 Negative     Specific Gravity, UA 09/07/2022 1 010     NT-proBNP 09/07/2022 64     hs TnI 0hr 09/07/2022 3     hs TnI 2hr 09/07/2022 3     Delta 2hr hsTnI 09/07/2022 0     Ventricular Rate 09/07/2022 69     Atrial Rate 09/07/2022 69     NM Interval 09/07/2022 164     QRSD Interval 09/07/2022 82     QT Interval 09/07/2022 364     QTC Interval 09/07/2022 390     P Axis 09/07/2022 59     QRS Axis 09/07/2022 67     T Wave Axis 09/07/2022 31        Lab Results   Component Value Date    WBC 11 58 (H) 09/07/2022    HGB 15 1 09/07/2022    HCT 47 8 (H) 09/07/2022    MCV 95 09/07/2022     09/07/2022       Lab Results   Component Value Date    SODIUM 139 09/07/2022    K 3 6 09/07/2022     09/07/2022    CO2 27 09/07/2022    AGAP 10 09/07/2022    BUN 17 09/07/2022    CREATININE 1 00 09/07/2022    GLUC 107 09/07/2022    GLUF 90 12/30/2021    CALCIUM 11 1 (H) 09/07/2022    AST 16 12/30/2021    ALT 26 12/30/2021    ALKPHOS 59 12/30/2021    TP 7 1 12/30/2021    TBILI 0 57 12/30/2021    EGFR 57 09/07/2022       No results found for: CRP    Lab Results   Component Value Date    EMD4AGPARFKP 1 900 04/18/2022       No results found for: IRON, TIBC, FERRITIN    Radiology Results:   XR chest 1 view portable    Result Date: 9/7/2022  Narrative: CHEST INDICATION:   pain  COMPARISON:  7/13/2019 EXAM PERFORMED/VIEWS:  XR CHEST PORTABLE Single view FINDINGS: Cardiomediastinal silhouette appears unremarkable  The lungs are clear  No pneumothorax or pleural effusion  Osseous structures appear within normal limits for patient age  Impression: No acute cardiopulmonary disease  Findings are stable Workstation performed: [id number]       ______________________________________________________________________  ASSESSMENT AND PLAN:    Roge Webb is a 69 y o  female with longstanding GERD, who developed acute epigastric pain in the setting recent COVID infection  She has not had recent EGD  She currently feels fine on twice daily famotidine  It is possible that this was a consequence of COVID infection  Alternatively, she could have peptic ulcer disease, Helicobacter pylori infection, gallstones  1  We are going to schedule EGD to screen for Moraes's esophagus, H pylori infection, peptic ulcer disease  2  We are also going order R upper quadrant ultrasound to rule out gallstone disease  3  She will continue to take famotidine 20 mg twice daily and avoid spicy foods        1  Epigastric pain        Orders Placed This Encounter   Procedures    US right upper quadrant    EGD

## 2022-09-16 NOTE — ASSESSMENT & PLAN NOTE
White count in recent our visit:  11 5  Absolute neutrophils 8 5    Suspect viral   Will recheck CBC

## 2022-09-23 NOTE — TELEPHONE ENCOUNTER
As a follow-up, a second attempt has been made for outreach via fax, please see Contacts section for details       Att x2 colo Ct    Thank you  Gem Gins

## 2022-09-27 NOTE — TELEPHONE ENCOUNTER
Upon review of the In Basket request we were able to locate, review, and update the patient chart as requested for CRC: Colonoscopy  Any additional questions or concerns should be emailed to the Practice Liaisons via [email or web address] email, please do not reply via In Basket      Thank you  Ab Ambrocio

## 2022-10-04 ENCOUNTER — RA CDI HCC (OUTPATIENT)
Dept: OTHER | Facility: HOSPITAL | Age: 67
End: 2022-10-04

## 2022-10-04 NOTE — PROGRESS NOTES
[facility]       Chart reviewed, no opportunity found: CHART REVIEWED, NO OPPORTUNITY FOUND        Patients Insurance        Commercial Insurance: [address] [place] [address]

## 2022-11-01 DIAGNOSIS — K21.9 GERD (GASTROESOPHAGEAL REFLUX DISEASE): ICD-10-CM

## 2022-11-01 DIAGNOSIS — I10 ESSENTIAL (PRIMARY) HYPERTENSION: Primary | ICD-10-CM

## 2022-11-02 RX ORDER — LISINOPRIL 20 MG/1
20 TABLET ORAL DAILY
Qty: 90 TABLET | Refills: 1 | Status: SHIPPED | OUTPATIENT
Start: 2022-11-02

## 2022-11-02 RX ORDER — FAMOTIDINE 20 MG/1
TABLET, FILM COATED ORAL
Qty: 90 TABLET | Refills: 1 | Status: SHIPPED | OUTPATIENT
Start: 2022-11-02

## 2022-11-03 ENCOUNTER — APPOINTMENT (OUTPATIENT)
Dept: LAB | Facility: CLINIC | Age: 67
End: 2022-11-03

## 2022-11-03 DIAGNOSIS — I10 ESSENTIAL (PRIMARY) HYPERTENSION: ICD-10-CM

## 2022-11-03 DIAGNOSIS — D72.829 LEUKOCYTOSIS, UNSPECIFIED TYPE: ICD-10-CM

## 2022-11-03 DIAGNOSIS — Z13.1 SCREENING FOR DIABETES MELLITUS: ICD-10-CM

## 2022-11-03 DIAGNOSIS — E78.5 HYPERLIPIDEMIA, UNSPECIFIED HYPERLIPIDEMIA TYPE: ICD-10-CM

## 2022-11-03 LAB
ALBUMIN SERPL BCP-MCNC: 3.8 G/DL (ref 3.5–5)
ALP SERPL-CCNC: 67 U/L (ref 46–116)
ALT SERPL W P-5'-P-CCNC: 24 U/L (ref 12–78)
ANION GAP SERPL CALCULATED.3IONS-SCNC: 7 MMOL/L (ref 4–13)
AST SERPL W P-5'-P-CCNC: 15 U/L (ref 5–45)
BASOPHILS # BLD AUTO: 0.03 THOUSANDS/ÂΜL (ref 0–0.1)
BASOPHILS NFR BLD AUTO: 1 % (ref 0–1)
BILIRUB SERPL-MCNC: 0.32 MG/DL (ref 0.2–1)
BUN SERPL-MCNC: 18 MG/DL (ref 5–25)
CALCIUM SERPL-MCNC: 10.9 MG/DL (ref 8.3–10.1)
CHLORIDE SERPL-SCNC: 109 MMOL/L (ref 96–108)
CHOLEST SERPL-MCNC: 276 MG/DL
CO2 SERPL-SCNC: 24 MMOL/L (ref 21–32)
CREAT SERPL-MCNC: 0.84 MG/DL (ref 0.6–1.3)
EOSINOPHIL # BLD AUTO: 0.26 THOUSAND/ÂΜL (ref 0–0.61)
EOSINOPHIL NFR BLD AUTO: 5 % (ref 0–6)
ERYTHROCYTE [DISTWIDTH] IN BLOOD BY AUTOMATED COUNT: 12.2 % (ref 11.6–15.1)
GFR SERPL CREATININE-BSD FRML MDRD: 71 ML/MIN/1.73SQ M
GLUCOSE P FAST SERPL-MCNC: 93 MG/DL (ref 65–99)
HCT VFR BLD AUTO: 42.4 % (ref 34.8–46.1)
HDLC SERPL-MCNC: 45 MG/DL
HGB BLD-MCNC: 13.1 G/DL (ref 11.5–15.4)
IMM GRANULOCYTES # BLD AUTO: 0.01 THOUSAND/UL (ref 0–0.2)
IMM GRANULOCYTES NFR BLD AUTO: 0 % (ref 0–2)
LDLC SERPL CALC-MCNC: 163 MG/DL (ref 0–100)
LYMPHOCYTES # BLD AUTO: 2.15 THOUSANDS/ÂΜL (ref 0.6–4.47)
LYMPHOCYTES NFR BLD AUTO: 37 % (ref 14–44)
MCH RBC QN AUTO: 29.1 PG (ref 26.8–34.3)
MCHC RBC AUTO-ENTMCNC: 30.9 G/DL (ref 31.4–37.4)
MCV RBC AUTO: 94 FL (ref 82–98)
MONOCYTES # BLD AUTO: 0.55 THOUSAND/ÂΜL (ref 0.17–1.22)
MONOCYTES NFR BLD AUTO: 10 % (ref 4–12)
NEUTROPHILS # BLD AUTO: 2.79 THOUSANDS/ÂΜL (ref 1.85–7.62)
NEUTS SEG NFR BLD AUTO: 47 % (ref 43–75)
NONHDLC SERPL-MCNC: 231 MG/DL
NRBC BLD AUTO-RTO: 0 /100 WBCS
PLATELET # BLD AUTO: 272 THOUSANDS/UL (ref 149–390)
PMV BLD AUTO: 10.9 FL (ref 8.9–12.7)
POTASSIUM SERPL-SCNC: 4.4 MMOL/L (ref 3.5–5.3)
PROT SERPL-MCNC: 7.1 G/DL (ref 6.4–8.4)
RBC # BLD AUTO: 4.5 MILLION/UL (ref 3.81–5.12)
SODIUM SERPL-SCNC: 140 MMOL/L (ref 135–147)
TRIGL SERPL-MCNC: 340 MG/DL
WBC # BLD AUTO: 5.79 THOUSAND/UL (ref 4.31–10.16)

## 2022-11-04 ENCOUNTER — OFFICE VISIT (OUTPATIENT)
Dept: FAMILY MEDICINE CLINIC | Facility: CLINIC | Age: 67
End: 2022-11-04

## 2022-11-04 ENCOUNTER — HOSPITAL ENCOUNTER (OUTPATIENT)
Dept: ULTRASOUND IMAGING | Facility: MEDICAL CENTER | Age: 67
Discharge: HOME | End: 2022-11-04

## 2022-11-04 VITALS
DIASTOLIC BLOOD PRESSURE: 74 MMHG | HEIGHT: 65 IN | HEART RATE: 77 BPM | OXYGEN SATURATION: 97 % | SYSTOLIC BLOOD PRESSURE: 102 MMHG | WEIGHT: 204.4 LBS | BODY MASS INDEX: 34.05 KG/M2 | TEMPERATURE: 99 F

## 2022-11-04 DIAGNOSIS — Z00.00 ANNUAL PHYSICAL EXAM: Primary | ICD-10-CM

## 2022-11-04 DIAGNOSIS — E78.2 MIXED HYPERLIPIDEMIA: ICD-10-CM

## 2022-11-04 DIAGNOSIS — E83.52 HYPERCALCEMIA: ICD-10-CM

## 2022-11-04 DIAGNOSIS — R10.13 EPIGASTRIC PAIN: ICD-10-CM

## 2022-11-04 DIAGNOSIS — Z23 NEED FOR TDAP VACCINATION: ICD-10-CM

## 2022-11-04 PROBLEM — E21.3 HYPERPARATHYROIDISM, UNSPECIFIED (HCC): Status: ACTIVE | Noted: 2022-11-04

## 2022-11-04 RX ORDER — SIMVASTATIN 10 MG
10 TABLET ORAL
COMMUNITY

## 2022-11-04 RX ORDER — LEVOTHYROXINE SODIUM 0.05 MG/1
50 TABLET ORAL DAILY
COMMUNITY
Start: 2022-10-03

## 2022-11-04 RX ORDER — OMEGA-3-ACID ETHYL ESTERS 1 G/1
1 CAPSULE, LIQUID FILLED ORAL 2 TIMES DAILY
Qty: 180 CAPSULE | Refills: 1 | Status: SHIPPED | OUTPATIENT
Start: 2022-11-04

## 2022-11-04 NOTE — PROGRESS NOTES
[facility]    NAME: Helen Vera  AGE: 69 y o  SEX: female  : 1955     DATE: 2022     Assessment and Plan:   Comprehensive physical exam  PMH and chronic conditions reviewed  Medications reconciled  Preventative care and recommended screenings as below  (Establish and chronic conditions visit 9/15/2022)  Follow-up 6 months-routine check  Annual physicals  Follow-up sooner as needed  Problem List Items Addressed This Visit        Other    Hyperlipidemia     Lipids elevated since pt self stopped statin: 276/340/45/163  She has since restarted simvastatin 10  We will add Lovaza 1000 BID  Recheck lipids 6 months  She is working on lifestyle change as well  Relevant Medications    simvastatin (ZOCOR) 10 mg tablet    omega-3-acid ethyl esters (LOVAZA) 1 g capsule    Other Relevant Orders    Lipid panel    Hypercalcemia     Hx of  Calcium 10 9  Historically elelvated  Has not seen specialist  Refer today to Endo         Relevant Orders    Ambulatory Referral to Endocrinology      Other Visit Diagnoses     Annual physical exam    -  Primary    Need for Tdap vaccination        Administered today by MA    Relevant Orders    TDAP VACCINE GREATER THAN OR EQUAL TO 8YO IM (Completed)          Immunizations and preventive care screenings were discussed with patient today  Appropriate education was printed on patient's after visit summary  Counseling:  Alcohol/drug use: discussed moderation in alcohol intake, the recommendations for healthy alcohol use, and avoidance of illicit drug use  Dental Health: discussed importance of regular tooth brushing, flossing, and dental visits  Injury prevention: discussed safety/seat belts, safety helmets, smoke detectors, carbon dioxide detectors, and smoking near bedding or upholstery    Sexual health: discussed sexually transmitted diseases, partner selection, use of condoms, avoidance of unintended pregnancy, and contraceptive alternatives  · Exercise: the importance of regular exercise/physical activity was discussed  Recommend exercise 3-5 times per week for at least 30 minutes  Healthy lifestyle reviewed again today  Starting back at gym  Following back up with weight Management  Eating healthier - watching diet    Falls Plan of Care: No fall risk identified    Urinary Incontinence Plan of Care: No incontience  Return in about 6 months (around 5/4/2023) for Recheck  Chief Complaint:     Chief Complaint   Patient presents with   • Follow-up     Review labs from yesterday  Patient would like to hold off on Shingrix vaccine  Had flu vaccine already  History of Present Illness:     Adult Annual Physical   Patient here for a comprehensive physical exam  The patient reports no problems  Diet and Physical Activity  · Diet/Nutrition: well balanced diet, consuming 3-5 servings of fruits/vegetables daily, adequate fiber intake and working on healthier eating  · Exercise: moderate cardiovascular exercise and just started back at gym  Depression Screening  PHQ-2/9 Depression Screening         General Health  · Sleep: sleeps well  · Hearing: normal - bilateral   · Vision: no vision problems and goes for regular eye exams  Follows routinely with ophthalmologist-glaucoma  · Dental: regular dental visits  · Echo scheduled  · Following with GI:  Right upper quad ultrasound completed today  · Encourage lung CT screen-patient resistant at this time  Will continue to assess readiness  · Immunizations: Tdap updated today  States she did receive pneumococcal 23 at age 70    [address]  · Patient is: postmenopausal  · Last menstrual period: years  · Contraceptive method: none    · Women's health over due  · Mammo scheduled  · Encourage routine self breast exams  · Dexa pending  · Encourage vitamin supplementation - hold on starting Calcium until evaluated by Endo     Review of Systems:     Review of Systems   Constitutional: Negative  HENT: Negative  Follows with allergist   Eyes: Negative  Follows with Opthalmology : glaucoma   Respiratory: Negative  Cardiovascular: Negative  Gastrointestinal: Negative  Genitourinary: Negative  Musculoskeletal: Negative  Skin: Negative  Follows with [facility]  Podiatry every 6 weeks - Dr Lindsay Hampton in [facility]   Neurological: Negative  Psychiatric/Behavioral: Negative         Past Medical History:     Past Medical History:   Diagnosis Date   • Allergic rhinitis    • Disease of thyroid gland    • GERD (gastroesophageal reflux disease)    • Glaucoma    • Hypercholesteremia    • Hypertension    • Hypertension       Past Surgical History:     Past Surgical History:   Procedure Laterality Date   • HYSTERECTOMY     • OOPHORECTOMY     • TONSILLECTOMY  childhood      Social History:     Social History     Socioeconomic History   • Marital status: Single     Spouse name: None   • Number of children: None   • Years of education: None   • Highest education level: None   Occupational History   • None   Tobacco Use   • Smoking status: Former Smoker     Packs/day: 0 50     Years: 41 00     Pack years: 20 50     Types: Cigarettes     Start date:      Quit date:      Years since quittin 8   • Smokeless tobacco: Never Used   Vaping Use   • Vaping Use: Never used   Substance and Sexual Activity   • Alcohol use: Yes     Comment: social    • Drug use: No   • Sexual activity: None   Other Topics Concern   • None   Social History Narrative   • None     Social Determinants of Health     Financial Resource Strain: Not on file   Food Insecurity: Not on file   Transportation Needs: Not on file   Physical Activity: Not on file   Stress: Not on file   Social Connections: Not on file   Intimate Partner Violence: Not on file   Housing Stability: Not on file      Family History:     Family History Problem Relation Age of Onset   • Cancer Father    • Colon cancer Father    • Skin cancer Father    • Diabetes Mother    • Heart attack Mother    • Heart disease Mother    • Hypertension Mother    • Hypertension Sister    • Prostate cancer Brother       Current Medications:     Current Outpatient Medications   Medication Sig Dispense Refill   • ALPRAZolam (XANAX) 0 25 mg tablet Take 1 tablet by mouth as needed     • ASPIRIN 81 PO Take by mouth daily     • Cholecalciferol (Vitamin D) 50 MCG (2000 UT) CAPS 2,000 Units daily     • famotidine (PEPCID) 20 mg tablet Start taking 20mg daily  If still having symptoms after 2 weeks, increase dose to 20mg bid  90 tablet 1   • fluorouracil (EFUDEX) 5 % cream Apply 1 application topically in the morning     • hydrochlorothiazide (HYDRODIURIL) 12 5 mg tablet Take 12 5 mg by mouth daily     • Latanoprostene Bunod (Vyzulta) 0 024 % SOLN Administer 1 drop to both eyes in the morning     • levocetirizine (XYZAL) 5 MG tablet Take 1 tablet (5 mg total) by mouth every evening 90 tablet 3   • levothyroxine 50 mcg tablet 50 mcg in the morning     • lisinopril (ZESTRIL) 20 mg tablet Take 1 tablet (20 mg total) by mouth daily 90 tablet 1   • montelukast (SINGULAIR) 10 mg tablet Take 1 tablet (10 mg total) by mouth daily at bedtime 90 tablet 3   • omega-3-acid ethyl esters (LOVAZA) 1 g capsule Take 1 capsule (1 g total) by mouth 2 (two) times a day 180 capsule 1   • simvastatin (ZOCOR) 10 mg tablet Take 10 mg by mouth daily at bedtime     • ketoconazole (NIZORAL) 2 % cream Apply topically daily Athletes foot (Patient not taking: Reported on 11/4/2022)     • nystatin-triamcinolone (MYCOLOG-II) cream Apply 1 application topically as needed   (Patient not taking: No sig reported)       No current facility-administered medications for this visit  Allergies:      Allergies   Allergen Reactions   • Doxycycline GI Intolerance   • Erythromycin GI Intolerance     Sensitivity to medication    sensitivity  Sensitivity to medication  sensitivity     • Norvasc [Amlodipine] Swelling      Physical Exam:     /74 (BP Location: Left arm, Patient Position: Sitting, Cuff Size: Adult)   Pulse 77   Temp 99 °F (37 2 °C)   Ht 5' 5" (1 651 m)   Wt 92 7 kg (204 lb 6 4 oz)   SpO2 97%   BMI 34 01 kg/m²     Physical Exam  Vitals and nursing note reviewed  Constitutional:       General: She is not in acute distress  Appearance: Normal appearance  She is well-developed and well-groomed  She is not ill-appearing  HENT:      Head: Normocephalic  Right Ear: Tympanic membrane, ear canal and external ear normal       Left Ear: Tympanic membrane, ear canal and external ear normal       Ears:      Comments: Lavaged right ear - small amount cerumen     Nose: Nose normal       Mouth/Throat:      Mouth: Mucous membranes are moist       Pharynx: Oropharynx is clear  Eyes:      Conjunctiva/sclera: Conjunctivae normal       Pupils: Pupils are equal, round, and reactive to light  Neck:      Thyroid: No thyromegaly  Vascular: No carotid bruit  Cardiovascular:      Rate and Rhythm: Normal rate and regular rhythm  Pulses:           Posterior tibial pulses are 2+ on the right side and 2+ on the left side  Heart sounds: Normal heart sounds  Pulmonary:      Effort: Pulmonary effort is normal  No respiratory distress  Breath sounds: Normal breath sounds and air entry  Abdominal:      General: Bowel sounds are normal       Palpations: Abdomen is soft  Tenderness: There is no abdominal tenderness  Musculoskeletal:      Right lower leg: No edema  Left lower leg: No edema  Lymphadenopathy:      Cervical: No cervical adenopathy  Skin:     General: Skin is warm and dry  Neurological:      General: No focal deficit present  Mental Status: She is alert and oriented to person, place, and time     Psychiatric:         Attention and Perception: Attention normal  Mood and Affect: Mood normal          Behavior: Behavior normal          Thought Content:  Thought content normal          Judgment: Judgment normal           Lemon Chana, CRNP   [address] [facility] [address]

## 2022-11-04 NOTE — ASSESSMENT & PLAN NOTE
Lipids elevated since pt self stopped statin: 276/340/45/163  She has since restarted simvastatin 10  We will add Lovaza 1000 BID  Recheck lipids 6 months  She is working on lifestyle change as well

## 2022-11-04 NOTE — PATIENT INSTRUCTIONS

## 2022-11-07 ENCOUNTER — TELEPHONE (OUTPATIENT)
Dept: FAMILY MEDICINE CLINIC | Facility: CLINIC | Age: 67
End: 2022-11-07

## 2022-11-11 ENCOUNTER — HOSPITAL ENCOUNTER (OUTPATIENT)
Dept: BONE DENSITY | Facility: MEDICAL CENTER | Age: 67
Discharge: HOME | End: 2022-11-11

## 2022-11-11 DIAGNOSIS — Z78.0 POST-MENOPAUSAL: ICD-10-CM

## 2022-11-11 DIAGNOSIS — Z13.820 SCREENING FOR OSTEOPOROSIS: ICD-10-CM

## 2022-11-11 RX ORDER — SODIUM CHLORIDE 9 MG/ML
125 INJECTION, SOLUTION INTRAVENOUS CONTINUOUS
Status: CANCELLED | OUTPATIENT
Start: 2022-11-11

## 2022-11-14 ENCOUNTER — HOSPITAL ENCOUNTER (OUTPATIENT)
Dept: GASTROENTEROLOGY | Facility: MEDICAL CENTER | Age: 67
Setting detail: OUTPATIENT SURGERY
Discharge: HOME | End: 2022-11-14

## 2022-11-14 ENCOUNTER — ANESTHESIA (OUTPATIENT)
Dept: GASTROENTEROLOGY | Facility: MEDICAL CENTER | Age: 67
End: 2022-11-14

## 2022-11-14 ENCOUNTER — ANESTHESIA EVENT (OUTPATIENT)
Dept: GASTROENTEROLOGY | Facility: MEDICAL CENTER | Age: 67
End: 2022-11-14

## 2022-11-14 VITALS
HEART RATE: 76 BPM | DIASTOLIC BLOOD PRESSURE: 62 MMHG | RESPIRATION RATE: 16 BRPM | OXYGEN SATURATION: 99 % | TEMPERATURE: 97 F | SYSTOLIC BLOOD PRESSURE: 92 MMHG

## 2022-11-14 DIAGNOSIS — R10.13 EPIGASTRIC PAIN: ICD-10-CM

## 2022-11-14 DIAGNOSIS — K21.00 GASTROESOPHAGEAL REFLUX DISEASE WITH ESOPHAGITIS WITHOUT HEMORRHAGE: Primary | ICD-10-CM

## 2022-11-14 RX ORDER — SODIUM CHLORIDE 9 MG/ML
125 INJECTION, SOLUTION INTRAVENOUS CONTINUOUS
Status: DISCONTINUED | OUTPATIENT
Start: 2022-11-14 | End: 2022-11-18 | Stop reason: HOSPADM

## 2022-11-14 RX ORDER — LIDOCAINE HYDROCHLORIDE 20 MG/ML
INJECTION, SOLUTION EPIDURAL; INFILTRATION; INTRACAUDAL; PERINEURAL AS NEEDED
Status: DISCONTINUED | OUTPATIENT
Start: 2022-11-14 | End: 2022-11-14

## 2022-11-14 RX ORDER — OMEPRAZOLE 40 MG/1
40 CAPSULE, DELAYED RELEASE ORAL DAILY
Qty: 90 CAPSULE | Refills: 1 | Status: SHIPPED | OUTPATIENT
Start: 2022-11-14

## 2022-11-14 RX ORDER — PROPOFOL 10 MG/ML
INJECTION, EMULSION INTRAVENOUS AS NEEDED
Status: DISCONTINUED | OUTPATIENT
Start: 2022-11-14 | End: 2022-11-14

## 2022-11-14 RX ADMIN — PROPOFOL 100 MG: 10 INJECTION, EMULSION INTRAVENOUS at 07:31

## 2022-11-14 RX ADMIN — LIDOCAINE HYDROCHLORIDE 60 MG: 20 INJECTION, SOLUTION EPIDURAL; INFILTRATION; INTRACAUDAL; PERINEURAL at 07:31

## 2022-11-14 RX ADMIN — SODIUM CHLORIDE 125 ML/HR: 0.9 INJECTION, SOLUTION INTRAVENOUS at 07:28

## 2022-11-14 RX ADMIN — PROPOFOL 50 MG: 10 INJECTION, EMULSION INTRAVENOUS at 07:34

## 2022-11-14 NOTE — ANESTHESIA POSTPROCEDURE EVALUATION
Post-Op Assessment Note    CV Status:  Stable    Pain management: adequate     Mental Status:  Alert and awake   Hydration Status:  Euvolemic   PONV Controlled:  Controlled   Airway Patency:  Patent      Post Op Vitals Reviewed: Yes      Staff: Anesthesiologist         No complications documented      BP     Temp     Pulse     Resp      SpO2      BP (!) 88/51   Pulse 60   Temp (!) 97 °F (36 1 °C)   Resp 20   SpO2 95%

## 2022-11-14 NOTE — H&P
History and Physical -  [facility]  Rebekah Mcconnell 69 y o  female MRN: [id number]                  HPI: Rebekah Mcconnell is a 69y o  year old female who presents for GERD and epigastric pain  REVIEW OF SYSTEMS: Per the HPI, and otherwise unremarkable  Historical Information   Past Medical History:   Diagnosis Date   • Allergic rhinitis    • Disease of thyroid gland    • GERD (gastroesophageal reflux disease)    • Glaucoma    • Hypercholesteremia    • Hypertension    • Hypertension      Past Surgical History:   Procedure Laterality Date   • HYSTERECTOMY     • OOPHORECTOMY     • TONSILLECTOMY  childhood     Social History   Social History     Substance and Sexual Activity   Alcohol Use Yes    Comment: social      Social History     Substance and Sexual Activity   Drug Use No     Social History     Tobacco Use   Smoking Status Former Smoker   • Packs/day: 0 50   • Years: 41 00   • Pack years: 20 50   • Types: Cigarettes   • Start date:    • Quit date:    • Years since quittin 8   Smokeless Tobacco Never Used     Family History   Problem Relation Age of Onset   • Cancer Father    • Colon cancer Father    • Skin cancer Father    • Diabetes Mother    • Heart attack Mother    • Heart disease Mother    • Hypertension Mother    • Hypertension Sister    • Prostate cancer Brother        Meds/Allergies     (Not in a hospital admission)      Allergies   Allergen Reactions   • Doxycycline GI Intolerance   • Erythromycin GI Intolerance     Sensitivity to medication    sensitivity  Sensitivity to medication  sensitivity     • Norvasc [Amlodipine] Swelling       Objective     There were no vitals taken for this visit  PHYSICAL EXAMINATION:    General Appearance:   Alert, cooperative, no distress   HEENT:  Normocephalic, atraumatic, anicteric  Neck supple, symmetrical, trachea midline  Lungs:   Equal chest rise and unlabored breathing, normal effort, no coughing  Cardiovascular:   No visualized JVD  Abdomen:   No abdominal distension  Skin:   No jaundice, rashes, or lesions  Musculoskeletal:   Normal range of motion visualized  Psych:  Normal affect and normal insight  Neuro:  Alert and appropriate  ASSESSMENT/PLAN:  This is a 69y o  year old female here for EGD, and she is stable and optimized for her procedure

## 2022-11-14 NOTE — ANESTHESIA PREPROCEDURE EVALUATION
Procedure:  EGD    Relevant Problems   ANESTHESIA (within normal limits)      CARDIO   (+) Essential (primary) hypertension   (+) Hyperlipidemia   (+) Nonrheumatic mitral (valve) insufficiency      ENDO   (+) Hyperparathyroidism, unspecified (HCC)   (+) Hypothyroidism      /RENAL   (+) Polycystic kidney, unspecified        Physical Exam    Airway    Mallampati score: I  TM Distance: >3 FB  Neck ROM: full     Dental       Cardiovascular  Rhythm: regular, Rate: normal,     Pulmonary  Breath sounds clear to auscultation,     Other Findings        Anesthesia Plan  ASA Score- 2     Anesthesia Type- IV sedation with anesthesia with ASA Monitors  Additional Monitors:   Airway Plan:           Plan Factors-Exercise tolerance (METS): >4 METS  Chart reviewed  Patient summary reviewed  Patient is not a current smoker  Induction- intravenous  Postoperative Plan-     Informed Consent- Anesthetic plan and risks discussed with patient

## 2022-11-15 NOTE — PROGRESS NOTES
Weight Management Medical Nutrition Assessment  Miya Odonnell is here for bundle 4/12  Last seen ~6 months ago  Current weight: 202 4 lbs, she has gained 3 4 lbs from her self reported weight over the last 6 months with overall loss of 8 9  Lbs since July 2021  Had EGD earlier this week due to increasing GERD s/p Covid infection in July  Taking pepcid 2x/day  Also found she was having side effects from Norvasc  No longer on this  During that time she was unable to tolerate her normal routine and had to rely on soft foods which were higher in carbs  She is feeling better now with the pepcid and has been able to reincorporate higher protein foods  She has also resumed the gym and doing the treadmill but intends to start strength training after her next trip  Encouragement provided  She will f/u in January       Dislikes beans      Patient seen by Medical Provider in past 6 months:  no  Requested to schedule appointment with Medical Provider: No    Anthropometric Measurements  Start Weight (#): 211 3 lb 7/7/21  Current wt: 202 4  Ideal Body Weight (#): 151 9 lbs BMI 25 (65 5" 127 5 lbs)  Goal Weight (#): lose 50 lbs  Highest: 225 lbs (teen years 240 lbs)  Lowest: 160 lbs     Weight Loss History  Previous weight loss attempts: Exercise  Self Created Diets (Portion Control, Healthy Food Choices, etc )  [facility]    Food and Nutrition Related History  Wake up: 7:00   Bed Time: 11:30    Food Recall   Breakfast: 8:30-10:00:  Light & fit yogurt    Snack: skip     Lunch: 12:30:  1/2 roll, chicken, 1/2 cup panera soup chicken/wild rice   Snack: peanuts  Dinner: 6:30-7:00: salad, chicken salad, macaroni salad  Snack: 90 venkatesh rice pudding OR yasso bar    Beverages: water and coffee/tea, coke zero, social alcohol   Volume of beverage intake: at least 50 oz water, 1 1/2 cup coffee w/h/h, sweet cream, truvia, 1 coke zero, iced tea sweetened     Weekends: Same  Cravings: pizza, chips  Trouble area of day: night    Frequency of Eating out: 1x/wk  Food restrictions: n/a  Cooking: self   Food Shopping: self    Physical Activity Intake  Activity: treadmill at gym  Frequency:45 minutes, 2x/wk  Physical limitations/barriers to exercise: knee pain    Estimated Needs  Energy  Bear Barb Energy Needs: BMR :9407   1-1 5# loss weekly sedentary: 741-3245             1-2# loss weekly lightly active: 1768-0078  Maintenance calories for sedentary activity level:  1742  Protein: 70-87 gm      (1 2-1 5g/kg IBW)  Fluid: 68 oz   (35mL/kg IBW)    Nutrition Diagnosis  Yes; Overweight/obesity  related to Excess energy intake as evidenced by  BMI more than normative standard for age and sex (obesity-grade I 26-30  9)       Nutrition Intervention    Nutrition Prescription  Calories: 6756-1307  Protein:  gm pro    Meal Plan (Jayy/Pro)  Breakfast: 150-225, 22-30  Snack: 100-150, 5-10  Lunch: 300-400, 20-28  Snack: 100-150, 5-10  Dinner: 300-400, 21-28  Snack: 100-150, 5-10    Nutrition Education:    Healthy Core Manual  Calorie controlled menu  Lean protein food choices  Healthy snack options  Food journaling tips      Nutrition Counseling:  Strategies: meal planning, portion sizes, healthy snack choices, hydration, fiber intake, protein intake, exercise, food journal      Monitoring and Evaluation:  Evaluation criteria:  Energy Intake  Meet protein needs  Maintain adequate hydration  Monitor weekly weight  Meal planning/preparation  Food journal   Decreased portions at mealtimes and snacks  Physical activity     Barriers to learning:none  Readiness to change: Action:  (Changing behavior)  Comprehension: good  Expected Compliance: good

## 2022-11-18 ENCOUNTER — OFFICE VISIT (OUTPATIENT)
Dept: BARIATRICS | Facility: CLINIC | Age: 67
End: 2022-11-18

## 2022-11-18 VITALS — BODY MASS INDEX: 32.53 KG/M2 | HEIGHT: 66 IN | WEIGHT: 202.4 LBS

## 2022-11-18 DIAGNOSIS — R63.5 ABNORMAL WEIGHT GAIN: Primary | ICD-10-CM

## 2022-11-18 NOTE — TELEPHONE ENCOUNTER
Voicemail from [facility] stating they are awaiting additional information from us for Omega 3  They need records and documentation that the patient has tried alternate medications first   She said they faxed a form to us for completion      Fax [phone]  Phone [phone] x [phone]

## 2022-11-25 ENCOUNTER — PREP FOR PROCEDURE (OUTPATIENT)
Dept: GASTROENTEROLOGY | Facility: CLINIC | Age: 67
End: 2022-11-25

## 2022-11-25 DIAGNOSIS — K21.00 GASTROESOPHAGEAL REFLUX DISEASE WITH ESOPHAGITIS WITHOUT HEMORRHAGE: Primary | ICD-10-CM

## 2022-11-30 DIAGNOSIS — E78.5 HYPERLIPIDEMIA, UNSPECIFIED HYPERLIPIDEMIA TYPE: ICD-10-CM

## 2022-11-30 DIAGNOSIS — I10 ESSENTIAL (PRIMARY) HYPERTENSION: Primary | ICD-10-CM

## 2022-12-01 RX ORDER — HYDROCHLOROTHIAZIDE 12.5 MG/1
12.5 TABLET ORAL DAILY
Qty: 90 TABLET | Refills: 1 | Status: SHIPPED | OUTPATIENT
Start: 2022-12-01

## 2022-12-01 RX ORDER — SIMVASTATIN 10 MG
10 TABLET ORAL
Qty: 90 TABLET | Refills: 1 | Status: SHIPPED | OUTPATIENT
Start: 2022-12-01

## 2022-12-02 ENCOUNTER — TELEPHONE (OUTPATIENT)
Dept: GASTROENTEROLOGY | Facility: CLINIC | Age: 67
End: 2022-12-02

## 2022-12-02 NOTE — TELEPHONE ENCOUNTER
Scheduled date of repeat 12 w EGD (as of today) 2/24/2023  Physician performing: Dr Lupe Duff  Location of procedure: [place]  Instructions given to patient:  NPO after midnight  Clearances: n/a    Spoke to patient and scheduled 12 w repeat EGD as requested by provider

## 2022-12-19 ENCOUNTER — CONSULT (OUTPATIENT)
Dept: ENDOCRINOLOGY | Facility: CLINIC | Age: 67
End: 2022-12-19

## 2022-12-19 VITALS
BODY MASS INDEX: 33.27 KG/M2 | DIASTOLIC BLOOD PRESSURE: 60 MMHG | HEART RATE: 64 BPM | WEIGHT: 207 LBS | HEIGHT: 66 IN | SYSTOLIC BLOOD PRESSURE: 96 MMHG

## 2022-12-19 DIAGNOSIS — E21.3 HYPERPARATHYROIDISM, UNSPECIFIED (HCC): ICD-10-CM

## 2022-12-19 DIAGNOSIS — E83.52 HYPERCALCEMIA: Primary | ICD-10-CM

## 2022-12-19 DIAGNOSIS — E03.9 ACQUIRED HYPOTHYROIDISM: ICD-10-CM

## 2022-12-19 NOTE — PROGRESS NOTES
Assessment/Plan:    Hypercalcemia  This may be related to primary hyperparathyroidism or hydrochlorothiazide  I have asked her to stop the hydrochlorothiazide and then check CMP, phosphorus and intact PTH  If the results show parathyroid mediated hypercalcemia, I will plan to do a 24-hour urine for calcium  Hyperparathyroidism, unspecified ([place] [address] )  Reassess after hydrochlorothiazide has been discontinued  Hypothyroidism  Continue levothyroxine  Diagnoses and all orders for this visit:    Hypercalcemia  -     Ambulatory Referral to Endocrinology  -     Comprehensive metabolic panel Lab Collect; Future  -     Phosphorus Lab Collect; Future  -     PTH, intact Lab Collect Lab Collect; Future    Hyperparathyroidism, unspecified ([facility] 75 )    Acquired hypothyroidism          Subjective:      Patient ID: Bridgett Bella is a 69 y o  female  68-year-old woman presents for consultation related to hypercalcemia  She has had high normal calcium levels in 2018  Subsequently, she developed hypercalcemia with most recent calcium close to 11  She does admit to worsening esophageal reflux but denies any other symptoms of hypercalcemia including polyuria, polydipsia, nocturia, abdominal pain and memory changes  She denies any fatigue  The following portions of the patient's history were reviewed and updated as appropriate: allergies, current medications, past family history, past medical history, past social history, past surgical history and problem list     Review of Systems   Constitutional: Negative for chills, fatigue and fever  Respiratory: Negative for shortness of breath  Cardiovascular: Negative for chest pain  Gastrointestinal: Negative for constipation, diarrhea, nausea and vomiting  Endocrine: Negative for polydipsia and polyuria  All other systems reviewed and are negative          Objective:      BP 96/60 (BP Location: Left arm, Patient Position: Sitting, Cuff Size: Standard)   Pulse 64   Ht 5' 5 5" (1 664 m)   Wt 93 9 kg (207 lb)   BMI 33 92 kg/m²          Physical Exam  Vitals reviewed  Constitutional:       General: She is not in acute distress  Appearance: She is well-developed  She is not diaphoretic  HENT:      Head: Normocephalic and atraumatic  Mouth/Throat:      Pharynx: No oropharyngeal exudate  Eyes:      General: Lids are normal  No scleral icterus  Right eye: No discharge  Left eye: No discharge  Conjunctiva/sclera: Conjunctivae normal    Neck:      Thyroid: No thyromegaly  Cardiovascular:      Rate and Rhythm: Normal rate and regular rhythm  Heart sounds: Normal heart sounds  No murmur heard  No friction rub  No gallop  Pulmonary:      Effort: Pulmonary effort is normal  No respiratory distress  Breath sounds: Normal breath sounds  No wheezing  Abdominal:      General: Bowel sounds are normal  There is no distension  Palpations: Abdomen is soft  Tenderness: There is no abdominal tenderness  Musculoskeletal:         General: No tenderness or deformity  Normal range of motion  Cervical back: Neck supple  Lymphadenopathy:      Head:      Right side of head: No occipital adenopathy  Left side of head: No occipital adenopathy  Upper Body:      Right upper body: No supraclavicular adenopathy  Left upper body: No supraclavicular adenopathy  Skin:     General: Skin is warm  Findings: No erythema or rash  Neurological:      Mental Status: She is alert and oriented to person, place, and time  Cranial Nerves: No cranial nerve deficit     Psychiatric:         Mood and Affect: Mood normal          Behavior: Behavior normal

## 2022-12-29 NOTE — PROGRESS NOTES
Name was verified by patient stating name? Yes   verified by patient stating? Yes  Verified the patient is alone? Yes  Offered patient a live visit but are now consenting to this telephone visit? Yes  Verified with the patient this visit will go towards their pre paid charges? Yes   Verified with the patient they are in the state of [place]? Yes    Weight Management Medical Nutrition Assessment  Alessandra Joe is here for bundle   Self reported current weight: 203 lbs, she has maintained her weight x 6 wks with overall loss of 8 3  Lbs since 2021  Feels she managed the holidays well overall  She finds she has been skipping breakfast often  Not eating until ~12:30 p m  at times  Discussed importance of not going too long between meals for better blood sugar control and hunger level  Also stressed importance of having enough protein to preserve muscle mass  If not having this meal she is more likely to have inadequate protein intake  She intends on resuming the gym next week  F/u in 1 month       Dislikes beans      Patient seen by Medical Provider in past 6 months:  no  Requested to schedule appointment with Medical Provider: No    Anthropometric Measurements  Start Weight (#): 211 3 lb 21  Current wt: 203 lbs self reported   Ideal Body Weight (#): 151 9 lbs BMI 25 (65 5" 127 5 lbs)  Goal Weight (#): lose 50 lbs  Highest: 225 lbs (teen years 240 lbs)  Lowest: 160 lbs     Weight Loss History  Previous weight loss attempts: Exercise  Self Created Diets (Portion Control, Healthy Food Choices, etc )  [employer]    Food and Nutrition Related History  Wake up: 7:00-7:30   Bed Time: 11:30    Food Recall   Breakfast: 8:30-10:00:  skip    Snack: skip     Lunch: 12:30: sometimes oikos pro OR usually leftovers OR 1/2 s/w with 10 chips (may not finish)  Snack: peanuts OR power crunch bar  Dinner: 6:30-7:00: fish/tuna/chicken, portioned carb, veggies   Snack: popcorn OR skip    Beverages: water and coffee/tea, coke zero, social alcohol   Volume of beverage intake: at least 50 oz water, 1 1/2 cup coffee w/h/h, sweet cream, truvia, 1 coke zero, iced tea sweetened     Weekends: Same  Cravings: pizza, chips  Trouble area of day: night    Frequency of Eating out: 1x/wk  Food restrictions: n/a  Cooking: self   Food Shopping: self    Physical Activity Intake  Activity: treadmill at gym  Frequency:45 minutes, 2x/wk>>>none currently will resume 1/9  Physical limitations/barriers to exercise: knee pain    Estimated Needs  Energy  Bear Barb Energy Needs: BMR :9832   1-1 5# loss weekly sedentary: 155-5918             1-2# loss weekly lightly active: 9767-5599  Maintenance calories for sedentary activity level:  1742  Protein: 70-87 gm      (1 2-1 5g/kg IBW)  Fluid: 68 oz   (35mL/kg IBW)    Nutrition Diagnosis  Yes; Overweight/obesity  related to Excess energy intake as evidenced by  BMI more than normative standard for age and sex (obesity-grade I 26-30  9)       Nutrition Intervention    Nutrition Prescription  Calories: 3468-0585  Protein:  gm pro    Meal Plan (Jayy/Pro)  Breakfast: 150-225, 22-30  Snack: 100-150, 5-10  Lunch: 300-400, 20-28  Snack: 100-150, 5-10  Dinner: 300-400, 21-28  Snack: 100-150, 5-10    Nutrition Education:    Healthy Core Manual  Calorie controlled menu  Lean protein food choices  Healthy snack options  Food journaling tips      Nutrition Counseling:  Strategies: meal planning, portion sizes, healthy snack choices, hydration, fiber intake, protein intake, exercise, food journal      Monitoring and Evaluation:  Evaluation criteria:  Energy Intake  Meet protein needs  Maintain adequate hydration  Monitor weekly weight  Meal planning/preparation  Food journal   Decreased portions at mealtimes and snacks  Physical activity     Barriers to learning:none  Readiness to change: Action:  (Changing behavior)  Comprehension: good  Expected Compliance: good

## 2022-12-30 ENCOUNTER — TELEPHONE (OUTPATIENT)
Dept: FAMILY MEDICINE CLINIC | Facility: CLINIC | Age: 67
End: 2022-12-30

## 2022-12-30 NOTE — TELEPHONE ENCOUNTER
LOV: 11/04/22  NOV: 05/05/23    Pt called requesting a refill of her levothyroxine - she would like a 90 day refill

## 2023-01-03 DIAGNOSIS — E03.9 ACQUIRED HYPOTHYROIDISM: Primary | ICD-10-CM

## 2023-01-03 RX ORDER — LEVOTHYROXINE SODIUM 0.05 MG/1
50 TABLET ORAL DAILY
Qty: 90 TABLET | Refills: 1 | Status: SHIPPED | OUTPATIENT
Start: 2023-01-03 | End: 2023-06-24 | Stop reason: SDUPTHER

## 2023-01-06 ENCOUNTER — OFFICE VISIT (OUTPATIENT)
Dept: BARIATRICS | Facility: CLINIC | Age: 68
End: 2023-01-06

## 2023-01-06 VITALS — WEIGHT: 203 LBS | HEIGHT: 66 IN | BODY MASS INDEX: 32.62 KG/M2

## 2023-01-06 DIAGNOSIS — R63.5 ABNORMAL WEIGHT GAIN: Primary | ICD-10-CM

## 2023-01-26 DIAGNOSIS — K21.9 GERD (GASTROESOPHAGEAL REFLUX DISEASE): ICD-10-CM

## 2023-01-27 RX ORDER — FAMOTIDINE 20 MG/1
TABLET, FILM COATED ORAL
Qty: 90 TABLET | Refills: 0 | Status: SHIPPED | OUTPATIENT
Start: 2023-01-27

## 2023-02-10 ENCOUNTER — LAB (OUTPATIENT)
Dept: LAB | Facility: CLINIC | Age: 68
End: 2023-02-10

## 2023-02-10 DIAGNOSIS — E21.3 HYPERPARATHYROIDISM, UNSPECIFIED (HCC): ICD-10-CM

## 2023-02-10 DIAGNOSIS — E83.52 HYPERCALCEMIA: ICD-10-CM

## 2023-02-10 DIAGNOSIS — E83.52 HYPERCALCEMIA: Primary | ICD-10-CM

## 2023-02-10 LAB
ALBUMIN SERPL BCP-MCNC: 3.9 G/DL (ref 3.5–5)
ALP SERPL-CCNC: 64 U/L (ref 46–116)
ALT SERPL W P-5'-P-CCNC: 26 U/L (ref 12–78)
ANION GAP SERPL CALCULATED.3IONS-SCNC: 3 MMOL/L (ref 4–13)
AST SERPL W P-5'-P-CCNC: 17 U/L (ref 5–45)
BILIRUB SERPL-MCNC: 0.32 MG/DL (ref 0.2–1)
BUN SERPL-MCNC: 21 MG/DL (ref 5–25)
CALCIUM SERPL-MCNC: 10.7 MG/DL (ref 8.3–10.1)
CHLORIDE SERPL-SCNC: 105 MMOL/L (ref 96–108)
CO2 SERPL-SCNC: 27 MMOL/L (ref 21–32)
CREAT SERPL-MCNC: 0.76 MG/DL (ref 0.6–1.3)
GFR SERPL CREATININE-BSD FRML MDRD: 80 ML/MIN/1.73SQ M
GLUCOSE P FAST SERPL-MCNC: 91 MG/DL (ref 65–99)
PHOSPHATE SERPL-MCNC: 3 MG/DL (ref 2.3–4.1)
POTASSIUM SERPL-SCNC: 4.1 MMOL/L (ref 3.5–5.3)
PROT SERPL-MCNC: 7.2 G/DL (ref 6.4–8.4)
PTH-INTACT SERPL-MCNC: 96.6 PG/ML (ref 18.4–80.1)
SODIUM SERPL-SCNC: 135 MMOL/L (ref 135–147)

## 2023-02-14 ENCOUNTER — HOSPITAL ENCOUNTER (OUTPATIENT)
Dept: NON INVASIVE DIAGNOSTICS | Facility: HOSPITAL | Age: 68
Discharge: HOME | End: 2023-02-14

## 2023-02-14 ENCOUNTER — OFFICE VISIT (OUTPATIENT)
Dept: ENDOCRINOLOGY | Facility: CLINIC | Age: 68
End: 2023-02-14

## 2023-02-14 VITALS
HEIGHT: 66 IN | HEART RATE: 70 BPM | BODY MASS INDEX: 32.62 KG/M2 | DIASTOLIC BLOOD PRESSURE: 60 MMHG | SYSTOLIC BLOOD PRESSURE: 96 MMHG | WEIGHT: 203 LBS

## 2023-02-14 VITALS
BODY MASS INDEX: 32.82 KG/M2 | HEIGHT: 66 IN | DIASTOLIC BLOOD PRESSURE: 72 MMHG | SYSTOLIC BLOOD PRESSURE: 128 MMHG | WEIGHT: 204.2 LBS

## 2023-02-14 DIAGNOSIS — E83.52 HYPERCALCEMIA: Primary | ICD-10-CM

## 2023-02-14 DIAGNOSIS — I10 ESSENTIAL (PRIMARY) HYPERTENSION: ICD-10-CM

## 2023-02-14 DIAGNOSIS — I34.0 NONRHEUMATIC MITRAL (VALVE) INSUFFICIENCY: ICD-10-CM

## 2023-02-14 DIAGNOSIS — E03.9 ACQUIRED HYPOTHYROIDISM: ICD-10-CM

## 2023-02-14 DIAGNOSIS — E21.3 HYPERPARATHYROIDISM, UNSPECIFIED (HCC): ICD-10-CM

## 2023-02-14 LAB
AORTIC ROOT: 2.6 CM
APICAL FOUR CHAMBER EJECTION FRACTION: 68 %
E WAVE DECELERATION TIME: 237 MS
FRACTIONAL SHORTENING: 39 % (ref 28–44)
INTERVENTRICULAR SEPTUM IN DIASTOLE (PARASTERNAL SHORT AXIS VIEW): 1.2 CM
INTERVENTRICULAR SEPTUM: 1.2 CM (ref 0.6–1.1)
LAAS-AP2: 9.9 CM2
LAAS-AP4: 16.6 CM2
LEFT ATRIUM AREA SYSTOLE SINGLE PLANE A4C: 13.4 CM2
LEFT ATRIUM SIZE: 2.6 CM
LEFT INTERNAL DIMENSION IN SYSTOLE: 2 CM (ref 2.1–4)
LEFT VENTRICULAR INTERNAL DIMENSION IN DIASTOLE: 3.3 CM (ref 3.5–6)
LEFT VENTRICULAR POSTERIOR WALL IN END DIASTOLE: 1.2 CM
LEFT VENTRICULAR STROKE VOLUME: 32 ML
LVSV (TEICH): 32 ML
MV E'TISSUE VEL-SEP: 9 CM/S
MV PEAK A VEL: 0.88 M/S
MV PEAK E VEL: 64 CM/S
MV STENOSIS PRESSURE HALF TIME: 69 MS
MV VALVE AREA P 1/2 METHOD: 3.19 CM2
RIGHT ATRIUM AREA SYSTOLE A4C: 9.5 CM2
RIGHT VENTRICLE ID DIMENSION: 3.5 CM
SL CV LEFT ATRIUM LENGTH A2C: 3.8 CM
SL CV PED ECHO LEFT VENTRICLE DIASTOLIC VOLUME (MOD BIPLANE) 2D: 45 ML
SL CV PED ECHO LEFT VENTRICLE SYSTOLIC VOLUME (MOD BIPLANE) 2D: 13 ML
TRICUSPID ANNULAR PLANE SYSTOLIC EXCURSION: 1.7 CM

## 2023-02-14 RX ORDER — LISINOPRIL 20 MG/1
30 TABLET ORAL DAILY
Qty: 90 TABLET | Refills: 1
Start: 2023-02-14

## 2023-02-14 NOTE — PROGRESS NOTES
Assessment/Plan:    Hyperparathyroidism, unspecified ([facility] 75 )  She most likely has primary hyperparathyroidism although we have not been able to complete the work-up  She resumed her hydrochlorothiazide and was on it when she had the calcium and PTH levels done  Therefore, these levels are not easily interpretable  She is agreeable to stopping the HCTZ and increasing lisinopril to 30 mg in order to control her blood pressure while she is off HCTZ  Repeat albumin, calcium and intact PTH in 2 weeks  If the PTH and calcium are elevated, we will do a 24-hour urine  Hypercalcemia  This is most likely due to primary hyperparathyroidism  Hypothyroidism  Continue current thyroid hormone supplementation  Essential (primary) hypertension  Increase lisinopril to 30 mg/day  Discontinue hydrochlorothiazide  Check BMP with next blood draw  Diagnoses and all orders for this visit:    Hypercalcemia  -     Calcium Lab Collect; Future  -     Albumin Lab Collect; Future  -     PTH, intact Lab Collect Lab Collect; Future    Essential (primary) hypertension  -     lisinopril (ZESTRIL) 20 mg tablet; Take 1 5 tablets (30 mg total) by mouth daily  -     Basic metabolic panel Lab Collect; Future    Hyperparathyroidism, unspecified ([facility] 75 )    Acquired hypothyroidism          Subjective:      Patient ID: Ange Correa is a 69 y o  female  68-year-old woman with hypercalcemia most likely due to primary hyperparathyroidism presents for follow-up  She states that she resumed her hydrochlorothiazide when she saw her blood pressure was elevated  The laboratory testing that was done recently was while she was taking hydrochlorothiazide  She has noticed some brain fog        The following portions of the patient's history were reviewed and updated as appropriate: allergies, current medications, past family history, past medical history, past social history, past surgical history and problem list     Review of Systems Constitutional: Negative for chills and fever  Respiratory: Negative for shortness of breath  Cardiovascular: Negative for chest pain  Gastrointestinal: Negative for constipation, diarrhea, nausea and vomiting  All other systems reviewed and are negative  Objective:      /72 (BP Location: Left arm, Patient Position: Sitting, Cuff Size: Standard)   Ht 5' 5 5" (1 664 m)   Wt 92 6 kg (204 lb 3 2 oz)   BMI 33 46 kg/m²          Physical Exam  Vitals reviewed  Constitutional:       General: She is not in acute distress  Appearance: She is well-developed  She is not diaphoretic  HENT:      Head: Normocephalic and atraumatic  Mouth/Throat:      Pharynx: No oropharyngeal exudate  Eyes:      General: Lids are normal  No scleral icterus  Right eye: No discharge  Left eye: No discharge  Conjunctiva/sclera: Conjunctivae normal    Neck:      Thyroid: No thyromegaly  Cardiovascular:      Rate and Rhythm: Normal rate and regular rhythm  Heart sounds: Normal heart sounds  No murmur heard  No friction rub  No gallop  Pulmonary:      Effort: Pulmonary effort is normal  No respiratory distress  Breath sounds: Normal breath sounds  No wheezing  Abdominal:      General: Bowel sounds are normal  There is no distension  Palpations: Abdomen is soft  Tenderness: There is no abdominal tenderness  Musculoskeletal:         General: No tenderness or deformity  Normal range of motion  Cervical back: Neck supple  Lymphadenopathy:      Head:      Right side of head: No occipital adenopathy  Left side of head: No occipital adenopathy  Upper Body:      Right upper body: No supraclavicular adenopathy  Left upper body: No supraclavicular adenopathy  Skin:     General: Skin is warm  Findings: No erythema or rash  Neurological:      Mental Status: She is alert and oriented to person, place, and time        Cranial Nerves: No cranial nerve deficit     Psychiatric:         Mood and Affect: Mood normal          Behavior: Behavior normal

## 2023-02-14 NOTE — ASSESSMENT & PLAN NOTE
She most likely has primary hyperparathyroidism although we have not been able to complete the work-up  She resumed her hydrochlorothiazide and was on it when she had the calcium and PTH levels done  Therefore, these levels are not easily interpretable  She is agreeable to stopping the HCTZ and increasing lisinopril to 30 mg in order to control her blood pressure while she is off HCTZ  Repeat albumin, calcium and intact PTH in 2 weeks  If the PTH and calcium are elevated, we will do a 24-hour urine  0 = understands/communicates without difficulty

## 2023-02-14 NOTE — PROGRESS NOTES
Name was verified by patient stating name? Yes   verified by patient stating? Yes  Verified the patient is alone? Yes  Offered patient a live visit but are now consenting to this telephone visit? Yes  Verified with the patient this visit will go towards their pre paid charges? Yes   Verified with the patient they are in the state of [place]? Yes    Weight Management Medical Nutrition Assessment  Miya Odonnell is here for bundle   Self reported current weight:   lbs, she has   x 1 month with overall loss of    Lbs since 2021  Feels she managed the holidays well overall  She finds she has been skipping breakfast often  Not eating until ~12:30 p m  at times  Discussed importance of not going too long between meals for better blood sugar control and hunger level  Also stressed importance of having enough protein to preserve muscle mass  If not having this meal she is more likely to have inadequate protein intake  She intends on resuming the gym next week  F/u in 1 month       Dislikes beans      Patient seen by Medical Provider in past 6 months:  no  Requested to schedule appointment with Medical Provider: No    Anthropometric Measurements  Start Weight (#): 211 3 lb 21  Current wt: 203 lbs self reported   Ideal Body Weight (#): 151 9 lbs BMI 25 (65 5" 127 5 lbs)  Goal Weight (#): lose 50 lbs  Highest: 225 lbs (teen years 240 lbs)  Lowest: 160 lbs     Weight Loss History  Previous weight loss attempts: Exercise  Self Created Diets (Portion Control, Healthy Food Choices, etc )  [facility]    Food and Nutrition Related History  Wake up: 7:00-7:30   Bed Time: 11:30    Food Recall   Breakfast: 8:30-10:00:  skip    Snack: skip     Lunch: 12:30: sometimes oikos pro OR usually leftovers OR 1/2 s/w with 10 chips (may not finish)  Snack: peanuts OR power crunch bar  Dinner: 6:30-7:00: fish/tuna/chicken, portioned carb, veggies   Snack: popcorn OR skip    Beverages: water and coffee/tea, coke zero, social alcohol   Volume of beverage intake: at least 50 oz water, 1 1/2 cup coffee w/h/h, sweet cream, truvia, 1 coke zero, iced tea sweetened     Weekends: Same  Cravings: pizza, chips  Trouble area of day: night    Frequency of Eating out: 1x/wk  Food restrictions: n/a  Cooking: self   Food Shopping: self    Physical Activity Intake  Activity: treadmill at gym  Frequency:45 minutes, 2x/wk>>>none currently will resume 1/9  Physical limitations/barriers to exercise: knee pain    Estimated Needs  Energy  Bear Barb Energy Needs: BMR :4648   1-1 5# loss weekly sedentary: 209-9298             1-2# loss weekly lightly active: 1217-4246  Maintenance calories for sedentary activity level:  1742  Protein: 70-87 gm      (1 2-1 5g/kg IBW)  Fluid: 68 oz   (35mL/kg IBW)    Nutrition Diagnosis  Yes; Overweight/obesity  related to Excess energy intake as evidenced by  BMI more than normative standard for age and sex (obesity-grade I 26-30  9)       Nutrition Intervention    Nutrition Prescription  Calories: 1119-6460  Protein:  gm pro    Meal Plan (Jayy/Pro)  Breakfast: 150-225, 22-30  Snack: 100-150, 5-10  Lunch: 300-400, 20-28  Snack: 100-150, 5-10  Dinner: 300-400, 21-28  Snack: 100-150, 5-10    Nutrition Education:    Healthy Core Manual  Calorie controlled menu  Lean protein food choices  Healthy snack options  Food journaling tips      Nutrition Counseling:  Strategies: meal planning, portion sizes, healthy snack choices, hydration, fiber intake, protein intake, exercise, food journal      Monitoring and Evaluation:  Evaluation criteria:  Energy Intake  Meet protein needs  Maintain adequate hydration  Monitor weekly weight  Meal planning/preparation  Food journal   Decreased portions at mealtimes and snacks  Physical activity     Barriers to learning:none  Readiness to change: Action:  (Changing behavior)  Comprehension: good  Expected Compliance: good

## 2023-02-15 ENCOUNTER — TELEPHONE (OUTPATIENT)
Dept: ENDOCRINOLOGY | Facility: CLINIC | Age: 68
End: 2023-02-15

## 2023-02-15 NOTE — TELEPHONE ENCOUNTER
----- Message from Marcela Huntley MD sent at 2/10/2023  6:51 PM EST -----  Calcium remains elevated with high parathyroid level  Recommend 24-hour urine calcium

## 2023-02-24 ENCOUNTER — OFFICE VISIT (OUTPATIENT)
Dept: BARIATRICS | Facility: CLINIC | Age: 68
End: 2023-02-24

## 2023-02-24 DIAGNOSIS — R63.5 ABNORMAL WEIGHT GAIN: Primary | ICD-10-CM

## 2023-02-24 NOTE — PROGRESS NOTES
Name was verified by patient stating name? Yes   verified by patient stating? Yes  Verified the patient is alone? Yes  Offered patient a live visit but are now consenting to this virtual visit? Yes  Verified with the patient this visit will go towards their pre paid charges? Yes   Verified with the patient they are in the state of [place]? Yes    Weight Management Medical Nutrition Assessment  Charlie Kidd is here for bundle   Self reported current weight: 202-204   Lbs which is stable  Went away for her birthday but felt she maintained  Still mindful when eating out  She is consistently taking spin class 2x/wk  Discussed adding a day of strength training and she does want to do this  Her goal is to start strength training once per week next week  Despite weight not changing much she is noticing positive changes to her body  Overall her current weight is lower than she has been consistently as both an adult and teen  No longer skipping breakfast and water intake has been good  She has no concerns at this time and will f/u in 2 months        Dislikes beans      Patient seen by Medical Provider in past 6 months:  no  Requested to schedule appointment with Medical Provider: No    Anthropometric Measurements  Start Weight (#): 211 3 lb 21  Current wt: -  Ideal Body Weight (#): 151 9 lbs BMI 25 (65 5" 127 5 lbs)  Goal Weight (#): lose 50 lbs  Highest: 225 lbs (teen years 240 lbs)  Lowest: 160 lbs     Weight Loss History  Previous weight loss attempts: Exercise  Self Created Diets (Portion Control, Healthy Food Choices, etc )  [facility]    Food and Nutrition Related History  Wake up: 7:00-7:30   Bed Time: 11:30    Food Recall   Breakfast: 10:30: greek yogurt, 1/2 owyn , grapes OR oatmeal    Snack: skip     Lunch: 12:30: salad, salmon  Snack: peanuts OR power crunch bar  Dinner: 6:30-7:00: fish/tuna/chicken, 1/2 cup carb, veggies   Snack: popcorn OR skip    Beverages: water and coffee/tea, coke zero, social alcohol Volume of beverage intake: at least 50 oz water, 1 1/2 cup coffee w/h/h, sweet cream, truvia, 1 coke zero, iced tea sweetened     Weekends: Same  Cravings: pizza, chips  Trouble area of day: night    Frequency of Eating out: 1x/wk  Food restrictions: n/a  Cooking: self   Food Shopping: self    Physical Activity Intake  Activity: spin class  Frequency:2x/wk    Physical limitations/barriers to exercise: knee pain    Estimated Needs  Energy  Bear Barb Energy Needs: BMR :0810   1-1 5# loss weekly sedentary: 152-0411             1-2# loss weekly lightly active: 7068-1431  Maintenance calories for sedentary activity level:  1742  Protein: 70-87 gm      (1 2-1 5g/kg IBW)  Fluid: 68 oz   (35mL/kg IBW)    Nutrition Diagnosis  Yes; Overweight/obesity  related to Excess energy intake as evidenced by  BMI more than normative standard for age and sex (obesity-grade I 26-30  9)       Nutrition Intervention    Nutrition Prescription  Calories: [phone]  Protein:  gm pro    Meal Plan (Jayy/Pro)  Breakfast: 150-225, 22-30  Snack: 100-150, 5-10  Lunch: 300-400, 20-28  Snack: 100-150, 5-10  Dinner: 300-400, 21-28  Snack: 100-150, 5-10    Nutrition Education:    Healthy Core Manual  Calorie controlled menu  Lean protein food choices  Healthy snack options  Food journaling tips      Nutrition Counseling:  Strategies: meal planning, portion sizes, healthy snack choices, hydration, fiber intake, protein intake, exercise, food journal      Monitoring and Evaluation:  Evaluation criteria:  Energy Intake  Meet protein needs  Maintain adequate hydration  Monitor weekly weight  Meal planning/preparation  Food journal   Decreased portions at mealtimes and snacks  Physical activity     Barriers to learning:none  Readiness to change: Action:  (Changing behavior)  Comprehension: good  Expected Compliance: good

## 2023-03-02 DIAGNOSIS — I34.0 NONRHEUMATIC MITRAL (VALVE) INSUFFICIENCY: ICD-10-CM

## 2023-03-02 DIAGNOSIS — I51.89 DIASTOLIC DYSFUNCTION: Primary | ICD-10-CM

## 2023-04-07 ENCOUNTER — APPOINTMENT (OUTPATIENT)
Dept: LAB | Facility: CLINIC | Age: 68
End: 2023-04-07

## 2023-04-07 DIAGNOSIS — E78.2 MIXED HYPERLIPIDEMIA: ICD-10-CM

## 2023-04-07 DIAGNOSIS — E83.52 HYPERCALCEMIA: ICD-10-CM

## 2023-04-07 DIAGNOSIS — I10 ESSENTIAL (PRIMARY) HYPERTENSION: ICD-10-CM

## 2023-04-07 LAB
ALBUMIN SERPL BCP-MCNC: 3.6 G/DL (ref 3.5–5)
ANION GAP SERPL CALCULATED.3IONS-SCNC: 1 MMOL/L (ref 4–13)
BUN SERPL-MCNC: 16 MG/DL (ref 5–25)
CALCIUM SERPL-MCNC: 9.9 MG/DL (ref 8.3–10.1)
CHLORIDE SERPL-SCNC: 109 MMOL/L (ref 96–108)
CHOLEST SERPL-MCNC: 177 MG/DL
CO2 SERPL-SCNC: 25 MMOL/L (ref 21–32)
CREAT SERPL-MCNC: 0.74 MG/DL (ref 0.6–1.3)
GFR SERPL CREATININE-BSD FRML MDRD: 82 ML/MIN/1.73SQ M
GLUCOSE P FAST SERPL-MCNC: 94 MG/DL (ref 65–99)
HDLC SERPL-MCNC: 49 MG/DL
LDLC SERPL CALC-MCNC: 85 MG/DL (ref 0–100)
NONHDLC SERPL-MCNC: 128 MG/DL
POTASSIUM SERPL-SCNC: 4.3 MMOL/L (ref 3.5–5.3)
PTH-INTACT SERPL-MCNC: 85.5 PG/ML (ref 18.4–80.1)
SODIUM SERPL-SCNC: 135 MMOL/L (ref 135–147)
TRIGL SERPL-MCNC: 216 MG/DL

## 2023-04-10 NOTE — RESULT ENCOUNTER NOTE
Corrected calcium and parathyroid levels are elevated  Recommend 24-hour urine calcium and creatinine

## 2023-04-13 DIAGNOSIS — J01.90 ACUTE SINUSITIS, RECURRENCE NOT SPECIFIED, UNSPECIFIED LOCATION: Primary | ICD-10-CM

## 2023-04-13 RX ORDER — METHYLPREDNISOLONE 4 MG/1
TABLET ORAL
Qty: 21 EACH | Refills: 0 | Status: SHIPPED | OUTPATIENT
Start: 2023-04-13 | End: 2023-05-05 | Stop reason: ALTCHOICE

## 2023-04-13 RX ORDER — AMOXICILLIN AND CLAVULANATE POTASSIUM 875; 125 MG/1; MG/1
1 TABLET, FILM COATED ORAL EVERY 12 HOURS SCHEDULED
Qty: 14 TABLET | Refills: 0 | Status: SHIPPED | OUTPATIENT
Start: 2023-04-13 | End: 2023-04-20

## 2023-04-23 NOTE — PROGRESS NOTES
"  Cardiology Office Note  MD Laverne Tobar MD Juleen Crochet, DO, MD Isael Munoz DO, Jasmyn Estrada DO, [facility] [place]  ----------------------------------------------------------------  [address] [place] [id number]    Richie Garcia 77 y o  female MRN: [id number]  Unit/Bed#:  Encounter: [id number]      History of Present Illness: It was a pleasure to see Richie Garcia in the office today for initial CV evaluation  She has a past medical history of hypertension, hypothyroid, dyslipidemia, obesity and tobacco use  She established care with us in April 2023  She was seen in the office due to findings of diastolic dysfunction on echocardiogram that was performed in February 2023  The echocardiogram was performed due to a history of \"murmur\" that was heard in the past   The echocardiogram demonstrated normal left ventricular systolic function without significant valvular disease aside from the diastolic dysfunction  Overall, the patient did admit to a longstanding history of hypertension, but her blood pressures were well controlled on a combination of lisinopril and hydrochlorothiazide  Due to hypercalcemia, her hydrochlorothiazide was discontinued and she was maintained on lisinopril alone for her blood pressure control  She denies any chest pain, pressure, tightness or squeezing  Denies lightheadedness, dizziness or palpitations  Denies lower extremity swelling orthopnea or paroxysmal nocturnal dyspnea  Review of Systems:  Review of Systems   Constitutional: Negative for decreased appetite, fever, weight gain and weight loss  HENT: Negative for congestion and sore throat  Eyes: Negative for visual disturbance  Cardiovascular: Negative for chest pain, dyspnea on exertion, leg swelling, near-syncope and palpitations  Respiratory: Negative for cough and shortness of breath      Hematologic/Lymphatic: Negative " for bleeding problem  Skin: Negative for rash  Musculoskeletal: Negative for myalgias and neck pain  Gastrointestinal: Negative for abdominal pain and nausea  Neurological: Negative for light-headedness and weakness  Psychiatric/Behavioral: Negative for depression         Past Medical History:   Diagnosis Date   • Allergic rhinitis    • Disease of thyroid gland    • GERD (gastroesophageal reflux disease)    • Glaucoma    • Hypercholesteremia    • Hypertension    • Hypertension        Past Surgical History:   Procedure Laterality Date   • HYSTERECTOMY     • OOPHORECTOMY     • TONSILLECTOMY  childhood       Social History     Socioeconomic History   • Marital status: Single     Spouse name: Not on file   • Number of children: Not on file   • Years of education: Not on file   • Highest education level: Not on file   Occupational History   • Not on file   Tobacco Use   • Smoking status: Former     Packs/day: 0 50     Years: 15 00     Pack years: 7 50     Types: Cigarettes     Start date:      Quit date: 2014     Years since quittin 3   • Smokeless tobacco: Never   Vaping Use   • Vaping Use: Never used   Substance and Sexual Activity   • Alcohol use: Not Currently     Comment: social    • Drug use: Never   • Sexual activity: Not Currently     Partners: Male   Other Topics Concern   • Not on file   Social History Narrative   • Not on file     Social Determinants of Health     Financial Resource Strain: Not on file   Food Insecurity: Not on file   Transportation Needs: Not on file   Physical Activity: Not on file   Stress: Not on file   Social Connections: Not on file   Intimate Partner Violence: Not on file   Housing Stability: Not on file       Family History   Problem Relation Age of Onset   • Cancer Father    • Colon cancer Father    • Skin cancer Father    • Diabetes Mother    • Heart attack Mother    • Heart disease Mother    • Hypertension Mother    • Diabetes type II Mother    • Hypothyroidism Mother    • Hypertension Sister    • Hypothyroidism Sister    • Prostate cancer Brother        Allergies   Allergen Reactions   • Doxycycline GI Intolerance   • Erythromycin GI Intolerance     Sensitivity to medication    sensitivity  Sensitivity to medication  sensitivity     • Norvasc [Amlodipine] Swelling         Current Outpatient Medications:   •  ALPRAZolam (XANAX) 0 25 mg tablet, Take 1 tablet by mouth as needed, Disp: , Rfl:   •  ASPIRIN 81 PO, Take by mouth daily, Disp: , Rfl:   •  Cholecalciferol (Vitamin D) 50 MCG (2000 UT) CAPS, 2,000 Units daily, Disp: , Rfl:   •  famotidine (PEPCID) 20 mg tablet, Start taking 20mg daily  If still having symptoms after 2 weeks, increase dose to 20mg bid , Disp: 90 tablet, Rfl: 3  •  fluorouracil (EFUDEX) 5 % cream, Apply 1 application topically daily as needed, Disp: , Rfl:   •  Latanoprostene Bunod (Vyzulta) 0 024 % SOLN, Administer 1 drop to both eyes in the morning, Disp: , Rfl:   •  levocetirizine (XYZAL) 5 MG tablet, Take 1 tablet (5 mg total) by mouth every evening, Disp: 90 tablet, Rfl: 3  •  levothyroxine 50 mcg tablet, Take 1 tablet (50 mcg total) by mouth in the morning, Disp: 90 tablet, Rfl: 1  •  lisinopril (ZESTRIL) 20 mg tablet, Take 1 5 tablets (30 mg total) by mouth daily, Disp: 90 tablet, Rfl: 1  •  montelukast (SINGULAIR) 10 mg tablet, Take 1 tablet (10 mg total) by mouth daily at bedtime, Disp: 90 tablet, Rfl: 3  •  simvastatin (ZOCOR) 10 mg tablet, TAKE ONE TABLET BY MOUTH EVERY DAY AT BEDTIME, Disp: 90 tablet, Rfl: 3  •  ketoconazole (NIZORAL) 2 % cream, Apply topically daily Athletes foot (Patient not taking: Reported on 2/14/2023), Disp: , Rfl:   •  methylPREDNISolone 4 MG tablet therapy pack, Use as directed on package, Disp: 21 each, Rfl: 0    Vitals:    04/24/23 1531   BP: 116/70   BP Location: Right arm   Patient Position: Sitting   Cuff Size: Large   Pulse: 58   Weight: 91 5 kg (201 lb 12 8 oz)     Body mass index is 33 07 kg/m²      PHYSICAL EXAMINATION:  Gen: Awake, Alert, NAD   Head/eyes: AT/NC, pupils equal and round, Anicteric  ENT: mmm  Neck: Supple, No elevated JVP, trachea midline  Resp: CTA bilaterally no w/r/r  CV: RRR +S1, S2, No m/r/g  Abd: Soft, obese, NT/ND + BS  Ext: no LE edema bilaterally  Neuro: Follows commands, moves all extermities  Psych: Appropriate affect, normal mood, pleasant attitude, non-combative  Skin: warm; no rash, erythema or venous stasis changes on exposed skin    --------------------------------------------------------------------------------  TREADMILL STRESS  No results found for this or any previous visit      --------------------------------------------------------------------------------  NUCLEAR STRESS TEST: No results found for this or any previous visit  No results found for this or any previous visit       --------------------------------------------------------------------------------  CATH:  No results found for this or any previous visit     --------------------------------------------------------------------------------  ECHO:   No results found for this or any previous visit  No results found for this or any previous visit     --------------------------------------------------------------------------------  HOLTER  No results found for this or any previous visit      No results found for this or any previous visit     --------------------------------------------------------------------------------  CAROTIDS  No results found for this or any previous visit      --------------------------------------------------------------------------------  ECGs:  Results for orders placed or performed in visit on 04/24/23   POCT ECG    Impression    Sinus bradycardia 58 bpm, otherwise normal ECG        Lab Results   Component Value Date    WBC 5 79 11/03/2022    HGB 13 1 11/03/2022    HCT 42 4 11/03/2022    MCV 94 11/03/2022     11/03/2022      Lab Results   Component Value Date    SODIUM 135 04/07/2023    K 4 3 04/07/2023     (H) 04/07/2023    CO2 25 04/07/2023    BUN 16 04/07/2023    CREATININE 0 74 04/07/2023    GLUC 107 09/07/2022    CALCIUM 9 9 04/07/2023      No results found for: HGBA1C   No results found for: CHOL  Lab Results   Component Value Date    HDL 49 (L) 04/07/2023    HDL 45 (L) 11/03/2022    HDL 43 (L) 12/30/2021     Lab Results   Component Value Date    LDLCALC 85 04/07/2023    LDLCALC 163 (H) 11/03/2022    LDLCALC 79 12/30/2021     Lab Results   Component Value Date    TRIG 216 (H) 04/07/2023    TRIG 340 (H) 11/03/2022    TRIG 227 (H) 12/30/2021     No results found for: CHOLHDL   No results found for: INR, PROTIME     1  Diastolic dysfunction  -     Ambulatory Referral to Cardiology    2  Essential (primary) hypertension  -     POCT ECG    3  Mixed hyperlipidemia    4  Obesity, Class I, BMI 30-34 9    5  Nonrheumatic mitral (valve) insufficiency  -     Ambulatory Referral to Cardiology  -     POCT ECG        IMPRESSION:  • Diastolic dysfunction  • Hypertension  o LVEF 65%, mild LVH, grade 1 diastolic dysfunction, AV sclerosis, MV sclerosis, trace MR/TR/WY, February 2023  • Dyslipidemia  • Obesity  • Hypothyroidism  • History of tobacco use - quit 2003  • History of hypercalcemia    PLAN:  It was a pleasure to see Rinku Hearn in the office today for initial CV evaluation  She is here today due to her history of diastolic dysfunction noted on echocardiogram in February 2023  Her diastolic dysfunction is likely related to her longstanding history of hypertension and age  She has no symptoms concerning for angina and no signs or symptoms of heart failure  She examines to be euvolemic in the office today  Blood pressure and heart rate are currently stable  She has been tolerating her current medications that any reported adverse effects  She is very active at the gym and can perform greater than 4 METS on a daily basis without significant exertional symptoms  ECG was nonischemic    Based on her "clinical presentation, I have the following recommendations:    1  Recommend 30 minutes a day, 5 days a week of moderate intensity activity to build cardiovascular endurance  2  Would encourage heart healthy diet low in sodium and carbohydrate  3  Continue current antihypertensive regimen including lisinopril  4   Continue statin therapy  Goal LDL is less than 70 mg/dL  Repeat lipid panel in 3 to 6 months  5   No additional CV testing at this time  6  Should she develop any significant symptoms of exertional chest discomfort, significant shortness of breath or palpitations, recommend calling the office or seeking immediate medical attention  7  We will follow-up with her in 6 months to reassess her progress  As always, please do not hesitate to call with any questions  Portions of the record may have been created with voice recognition software  Occasional wrong word or \"sound a like\" substitutions may have occurred due to the inherent limitations of voice recognition software  Read the chart carefully and recognize, using context, where substitutions have occurred        Signed: Maru Burns DO, FACC, TERRA, FACP  "

## 2023-04-24 ENCOUNTER — CONSULT (OUTPATIENT)
Dept: CARDIOLOGY CLINIC | Facility: CLINIC | Age: 68
End: 2023-04-24

## 2023-04-24 VITALS
HEART RATE: 58 BPM | DIASTOLIC BLOOD PRESSURE: 70 MMHG | WEIGHT: 201.8 LBS | BODY MASS INDEX: 33.07 KG/M2 | SYSTOLIC BLOOD PRESSURE: 116 MMHG

## 2023-04-24 DIAGNOSIS — E66.9 OBESITY, CLASS I, BMI 30-34.9: ICD-10-CM

## 2023-04-24 DIAGNOSIS — E78.2 MIXED HYPERLIPIDEMIA: ICD-10-CM

## 2023-04-24 DIAGNOSIS — I34.0 NONRHEUMATIC MITRAL (VALVE) INSUFFICIENCY: ICD-10-CM

## 2023-04-24 DIAGNOSIS — I10 ESSENTIAL (PRIMARY) HYPERTENSION: ICD-10-CM

## 2023-04-24 DIAGNOSIS — I51.89 DIASTOLIC DYSFUNCTION: Primary | ICD-10-CM

## 2023-04-27 DIAGNOSIS — I10 ESSENTIAL (PRIMARY) HYPERTENSION: ICD-10-CM

## 2023-04-27 RX ORDER — LISINOPRIL 20 MG/1
30 TABLET ORAL DAILY
Qty: 90 TABLET | Refills: 0 | Status: SHIPPED | OUTPATIENT
Start: 2023-04-27

## 2023-04-28 ENCOUNTER — RA CDI HCC (OUTPATIENT)
Dept: OTHER | Facility: HOSPITAL | Age: 68
End: 2023-04-28

## 2023-04-28 NOTE — PROGRESS NOTES
[facility]  coding opportunities       Chart reviewed, no opportunity found: CHART REVIEWED, NO OPPORTUNITY FOUND      This is a reminder to address ALL HCC (risk adjustment) codes as found on active problem list for 2023 as patient scores reset to zero ALPHONSE      Patients Insurance        Commercial Insurance: [address] [place] [address]

## 2023-05-05 ENCOUNTER — OFFICE VISIT (OUTPATIENT)
Dept: FAMILY MEDICINE CLINIC | Facility: CLINIC | Age: 68
End: 2023-05-05

## 2023-05-05 VITALS
OXYGEN SATURATION: 99 % | HEART RATE: 61 BPM | BODY MASS INDEX: 33.17 KG/M2 | HEIGHT: 66 IN | TEMPERATURE: 97.8 F | WEIGHT: 206.4 LBS | DIASTOLIC BLOOD PRESSURE: 78 MMHG | SYSTOLIC BLOOD PRESSURE: 118 MMHG

## 2023-05-05 DIAGNOSIS — E21.3 HYPERPARATHYROIDISM, UNSPECIFIED (HCC): ICD-10-CM

## 2023-05-05 DIAGNOSIS — Z87.891 HISTORY OF TOBACCO ABUSE: ICD-10-CM

## 2023-05-05 DIAGNOSIS — R09.82 PND (POST-NASAL DRIP): ICD-10-CM

## 2023-05-05 DIAGNOSIS — J30.1 ALLERGIC RHINITIS DUE TO POLLEN, UNSPECIFIED SEASONALITY: ICD-10-CM

## 2023-05-05 DIAGNOSIS — I51.89 DIASTOLIC DYSFUNCTION: ICD-10-CM

## 2023-05-05 DIAGNOSIS — E78.5 HYPERLIPIDEMIA, UNSPECIFIED HYPERLIPIDEMIA TYPE: ICD-10-CM

## 2023-05-05 DIAGNOSIS — E03.9 ACQUIRED HYPOTHYROIDISM: ICD-10-CM

## 2023-05-05 DIAGNOSIS — I10 ESSENTIAL (PRIMARY) HYPERTENSION: Primary | ICD-10-CM

## 2023-05-05 RX ORDER — LEVOCETIRIZINE DIHYDROCHLORIDE 5 MG/1
5 TABLET, FILM COATED ORAL EVERY EVENING
Qty: 90 TABLET | Refills: 3 | Status: SHIPPED | OUTPATIENT
Start: 2023-05-05

## 2023-05-05 NOTE — ASSESSMENT & PLAN NOTE
Managed by Endo Xeljanz Counseling: I discussed with the patient the risks of Xeljanz therapy including increased risk of infection, liver issues, headache, diarrhea, or cold symptoms. Live vaccines should be avoided. They were instructed to call if they have any problems.

## 2023-05-05 NOTE — PROGRESS NOTES
[facility]    ASSESSMENT AND PLAN     1  Essential (primary) hypertension  Assessment & Plan:  Stable  Doing well since med change  Currently taking Lisinopril 10 am/20 pm  BP today: 118/78  Asymptomatic  BMP stable  Advise to continue periodic home pressure checks  F/U 4 month  Sooner with any concerns    Orders:  -     CBC and differential; Future  -     Comprehensive metabolic panel; Future    2  Acquired hypothyroidism  Assessment & Plan:  Due for TSH - order in  Appears stable on current Levothyroxine dose: 50    Orders:  -     TSH, 3rd generation with Free T4 reflex; Future    3  Hyperlipidemia, unspecified hyperlipidemia type  Assessment & Plan:  Significant improvement with simvastatin 10  Lipids today: 177/216/49/85  No myalgias  Recheck one year  4  Allergic rhinitis due to pollen, unspecified seasonality  -     levocetirizine (XYZAL) 5 MG tablet; Take 1 tablet (5 mg total) by mouth every evening    5  PND (post-nasal drip)  -     levocetirizine (XYZAL) 5 MG tablet; Take 1 tablet (5 mg total) by mouth every evening    6  Diastolic dysfunction  Assessment & Plan:  Following with cardiology      7  History of tobacco abuse  Assessment & Plan:  Pt remains resistant to CT screen  Continue to encourage compliance with imaging  20 5 pack per year times 40 years  Quit 8 years ago  Admittedly vaped for short time after quitting  Nothing in years        Note Abdomenal/pelvis CT 2019:  Incidentally noted multiple subcentimeter pulmonary nodules in visualized lungs measuring up to 6 mm in average axial dimension   Based on current Fleischner Society 2017 Guidelines on incidental pulmonary nodule, followup non-contrast CT is   recommended at 6-12 months from the initial examination and, if stable at that time, an additional followup is recommended for 18-24 months from the initial examination   Recommend obtaining baseline chest CT and further follow-up per described guideline      8  "Hyperparathyroidism, unspecified ([facility] )  Assessment & Plan:  Managed by Endo           SUBJECTIVE       Patient ID: Dallas Velez is a 77 y o  female  Chief Complaint   Patient presents with    Follow-up     6 month- no concerns today       HISTORY OF PRESENT ILLNESS    Routine check up   No new concerns    Specialists:  Endo; cardiology        The following portions of the patient's history were reviewed and updated as appropriate: allergies, current medications, past family history, past medical history, past social history, past surgical history, and problem list     REVIEW OF SYSTEMS  Review of Systems   Constitutional: Negative  HENT: Negative  Eyes: Negative  Respiratory: Negative  Cardiovascular: Negative  Gastrointestinal: Negative  Genitourinary: Negative  Musculoskeletal: Negative  Skin: Negative  Neurological: Negative  Psychiatric/Behavioral: Negative  OBJECTIVE      VITAL SIGNS  /78 (BP Location: Right arm, Patient Position: Sitting, Cuff Size: Adult)   Pulse 61   Temp 97 8 °F (36 6 °C)   Ht 5' 5 5\" (1 664 m)   Wt 93 6 kg (206 lb 6 4 oz)   SpO2 99%   BMI 33 82 kg/m²     CURRENT MEDICATIONS    Current Outpatient Medications:     ALPRAZolam (XANAX) 0 25 mg tablet, Take 1 tablet by mouth as needed, Disp: , Rfl:     ASPIRIN 81 PO, Take by mouth daily, Disp: , Rfl:     famotidine (PEPCID) 20 mg tablet, Start taking 20mg daily   If still having symptoms after 2 weeks, increase dose to 20mg bid , Disp: 90 tablet, Rfl: 3    fluorouracil (EFUDEX) 5 % cream, Apply 1 application topically daily as needed, Disp: , Rfl:     Latanoprostene Bunod (Vyzulta) 0 024 % SOLN, Administer 1 drop to both eyes in the morning, Disp: , Rfl:     levocetirizine (XYZAL) 5 MG tablet, Take 1 tablet (5 mg total) by mouth every evening, Disp: 90 tablet, Rfl: 3    levothyroxine 50 mcg tablet, Take 1 tablet (50 mcg total) by mouth in the morning, Disp: 90 tablet, Rfl: 1    " lisinopril (ZESTRIL) 20 mg tablet, Take 1 5 tablets (30 mg total) by mouth daily, Disp: 90 tablet, Rfl: 0    montelukast (SINGULAIR) 10 mg tablet, Take 1 tablet (10 mg total) by mouth daily at bedtime, Disp: 90 tablet, Rfl: 3    simvastatin (ZOCOR) 10 mg tablet, TAKE ONE TABLET BY MOUTH EVERY DAY AT BEDTIME, Disp: 90 tablet, Rfl: 3    Cholecalciferol (Vitamin D) 50 MCG (2000 UT) CAPS, 2,000 Units daily (Patient not taking: Reported on 5/5/2023), Disp: , Rfl:     ketoconazole (NIZORAL) 2 % cream, Apply topically daily Athletes foot (Patient not taking: Reported on 2/14/2023), Disp: , Rfl:       PHYSICAL EXAMINATION   Physical Exam  Vitals and nursing note reviewed  Constitutional:       General: She is not in acute distress  Appearance: Normal appearance  She is well-developed and well-groomed  She is not ill-appearing  HENT:      Head: Normocephalic and atraumatic  Right Ear: Tympanic membrane, ear canal and external ear normal       Left Ear: Tympanic membrane, ear canal and external ear normal       Mouth/Throat:      Mouth: Mucous membranes are moist    Eyes:      Pupils: Pupils are equal, round, and reactive to light  Neck:      Vascular: No carotid bruit  Cardiovascular:      Rate and Rhythm: Normal rate and regular rhythm  Pulses:           Posterior tibial pulses are 2+ on the right side and 2+ on the left side  Heart sounds: Normal heart sounds  Pulmonary:      Effort: Pulmonary effort is normal  No respiratory distress  Breath sounds: Normal breath sounds and air entry  Musculoskeletal:      Right lower leg: No edema  Left lower leg: No edema  Lymphadenopathy:      Cervical: No cervical adenopathy  Skin:     General: Skin is warm and dry  Neurological:      General: No focal deficit present  Mental Status: She is alert and oriented to person, place, and time     Psychiatric:         Attention and Perception: Attention normal          Mood and Affect: Mood normal          Behavior: Behavior normal          Thought Content:  Thought content normal          Judgment: Judgment normal

## 2023-05-05 NOTE — ASSESSMENT & PLAN NOTE
Significant improvement with simvastatin 10  Lipids today: 177/216/49/85  No myalgias  Recheck one year

## 2023-05-05 NOTE — ASSESSMENT & PLAN NOTE
Stable  Doing well since med change  Currently taking Lisinopril 10 am/20 pm  BP today: 118/78  Asymptomatic  BMP stable  Advise to continue periodic home pressure checks  F/U 4 month   Sooner with any concerns

## 2023-05-05 NOTE — ASSESSMENT & PLAN NOTE
Pt remains resistant to CT screen  Continue to encourage compliance with imaging  20 5 pack per year times 40 years  Quit 8 years ago  Admittedly vaped for short time after quitting    Nothing in years        Note Abdomenal/pelvis CT 2019:  Incidentally noted multiple subcentimeter pulmonary nodules in visualized lungs measuring up to 6 mm in average axial dimension   Based on current Fleischner Society 2017 Guidelines on incidental pulmonary nodule, followup non-contrast CT is   recommended at 6-12 months from the initial examination and, if stable at that time, an additional followup is recommended for 18-24 months from the initial examination   Recommend obtaining baseline chest CT and further follow-up per described guideline

## 2023-05-09 ENCOUNTER — LAB (OUTPATIENT)
Dept: LAB | Facility: CLINIC | Age: 68
End: 2023-05-09

## 2023-05-09 DIAGNOSIS — E83.52 HYPERCALCEMIA: ICD-10-CM

## 2023-05-09 LAB
CALCIUM 24H UR-MCNC: 147.15 MG/24 HRS (ref 42–353)
SPECIMEN VOL UR: 1350 ML

## 2023-05-23 DIAGNOSIS — K21.9 GERD (GASTROESOPHAGEAL REFLUX DISEASE): ICD-10-CM

## 2023-05-23 DIAGNOSIS — J01.90 ACUTE SINUSITIS, RECURRENCE NOT SPECIFIED, UNSPECIFIED LOCATION: Primary | ICD-10-CM

## 2023-05-23 RX ORDER — METHYLPREDNISOLONE 4 MG/1
TABLET ORAL
Qty: 21 EACH | Refills: 0 | Status: SHIPPED | OUTPATIENT
Start: 2023-05-23

## 2023-05-23 RX ORDER — FAMOTIDINE 20 MG/1
TABLET, FILM COATED ORAL
Qty: 30 TABLET | Refills: 0 | Status: SHIPPED | OUTPATIENT
Start: 2023-05-23 | End: 2023-06-14 | Stop reason: SDUPTHER

## 2023-05-23 RX ORDER — AMOXICILLIN AND CLAVULANATE POTASSIUM 875; 125 MG/1; MG/1
1 TABLET, FILM COATED ORAL EVERY 12 HOURS SCHEDULED
Qty: 14 TABLET | Refills: 0 | Status: SHIPPED | OUTPATIENT
Start: 2023-05-23 | End: 2023-05-30

## 2023-06-09 ENCOUNTER — OFFICE VISIT (OUTPATIENT)
Dept: GASTROENTEROLOGY | Facility: CLINIC | Age: 68
End: 2023-06-09
Payer: COMMERCIAL

## 2023-06-09 VITALS
HEART RATE: 79 BPM | HEIGHT: 66 IN | TEMPERATURE: 97.8 F | WEIGHT: 205 LBS | DIASTOLIC BLOOD PRESSURE: 79 MMHG | OXYGEN SATURATION: 99 % | BODY MASS INDEX: 32.95 KG/M2 | SYSTOLIC BLOOD PRESSURE: 119 MMHG

## 2023-06-09 DIAGNOSIS — Z80.0 FAMILY HISTORY OF COLON CANCER: ICD-10-CM

## 2023-06-09 DIAGNOSIS — K21.00 GASTROESOPHAGEAL REFLUX DISEASE WITH ESOPHAGITIS WITHOUT HEMORRHAGE: Primary | ICD-10-CM

## 2023-06-09 PROCEDURE — 99214 OFFICE O/P EST MOD 30 MIN: CPT | Performed by: INTERNAL MEDICINE

## 2023-06-09 RX ORDER — AMOXICILLIN 500 MG/1
CAPSULE ORAL
COMMUNITY

## 2023-06-09 NOTE — PROGRESS NOTES
[facility] - Outpatient Follow-up Note  Piedad Coello 77 y o  female MRN: [id number]  Encounter: [id number]          ASSESSMENT AND PLAN:    Piedad Coello is a 77 y o  female hiatal hernia and GERD  Her last EGD showed esophagitis and she has been taking regular famotidine since  No symptoms currently  She also has FH of colon cancer and is due for colonoscopy in 2024     1  Continue famotidine 20 mg 1 or 2 times per day  2  Schedule EGD and colonoscopy in early 2024     1  Gastroesophageal reflux disease with esophagitis without hemorrhage    2  Family history of colon cancer        Orders Placed This Encounter   Procedures   • Colonoscopy   • EGD     ______________________________________________________________________    SUBJECTIVE:    Piedad Coello is a 77 y o  female with history of GERD who presents for follow-up  I previously saw her in September 2022 when she was experiencing epigastric pain  At that time we scheduled right upper quadrant ultrasound and EGD  Her ultrasound showed a tiny echogenic focus of 4 x 3 mm in the gallbladder, either small polyp or sludge ball  Her EGD showed a 2 cm sliding hiatal hernia and grade C esophagitis  Stomach biopsies were negative for H  pylori  I instructed her to start taking omeprazole following the EGD and we planned to repeat EGD  However, she says that PPIs did not work for her in the past   Instead, she has been taking famotidine 20 mg 1 or 2 times per day  She is now taking famotidine regularly at least once per day  She has no GERD, epigastric pain, or dysphagia  She needs to have her repeat EGD after November due to very high deductible with her current insurance  Her father had colon cancer  Last colonoscopy was in 2019  Next colonoscopy should be in 2024  REVIEW OF SYSTEMS IS OTHERWISE NEGATIVE        Historical Information   Past Medical History:   Diagnosis Date   • Allergic rhinitis • Disease of thyroid gland    • GERD (gastroesophageal reflux disease)    • Glaucoma    • Hypercholesteremia    • Hypertension    • Hypertension      Past Surgical History:   Procedure Laterality Date   • HYSTERECTOMY     • OOPHORECTOMY     • TONSILLECTOMY  childhood     Social History   Social History     Substance and Sexual Activity   Alcohol Use Not Currently    Comment: social      Social History     Substance and Sexual Activity   Drug Use Never     Social History     Tobacco Use   Smoking Status Former   • Packs/day: 0 50   • Years: 15 00   • Total pack years: 7 50   • Types: Cigarettes   • Start date: 78   • Quit date: 2014   • Years since quittin 4   Smokeless Tobacco Never     Family History   Problem Relation Age of Onset   • Cancer Father    • Colon cancer Father    • Skin cancer Father    • Diabetes Mother    • Heart attack Mother    • Heart disease Mother    • Hypertension Mother    • Diabetes type II Mother    • Hypothyroidism Mother    • Hypertension Sister    • Hypothyroidism Sister    • Prostate cancer Brother        Meds/Allergies       Current Outpatient Medications:   •  ALPRAZolam (XANAX) 0 25 mg tablet  •  ASPIRIN 81 PO  •  Cholecalciferol (Vitamin D) 50 MCG ( UT) CAPS  •  famotidine (PEPCID) 20 mg tablet  •  fluorouracil (EFUDEX) 5 % cream  •  ketoconazole (NIZORAL) 2 % cream  •  Latanoprostene Bunod (Vyzulta) 0 024 % SOLN  •  levocetirizine (XYZAL) 5 MG tablet  •  levothyroxine 50 mcg tablet  •  lisinopril (ZESTRIL) 20 mg tablet  •  montelukast (SINGULAIR) 10 mg tablet  •  simvastatin (ZOCOR) 10 mg tablet  •  amoxicillin (AMOXIL) 500 mg capsule  •  methylPREDNISolone 4 MG tablet therapy pack    Allergies   Allergen Reactions   • Doxycycline GI Intolerance   • Erythromycin GI Intolerance     Sensitivity to medication    sensitivity  Sensitivity to medication  sensitivity     • Norvasc [Amlodipine] Swelling           Objective     Blood pressure 119/79, pulse 79, temperature "97 8 °F (36 6 °C), temperature source Tympanic, height 5' 6\" (1 676 m), weight 93 kg (205 lb), SpO2 99 %  Body mass index is 33 09 kg/m²  PHYSICAL EXAM:      General Appearance:   Alert, cooperative, no distress   HEENT:   Normocephalic, atraumatic, anicteric  Neck:  Supple, symmetrical, trachea midline   Lungs:   Clear to auscultation bilaterally; no rales, rhonchi or wheezing; respirations unlabored    Heart[de-identified]   Regular rate and rhythm; no murmur, rub, or gallop  Abdomen:   Soft, non-tender, non-distended; normal bowel sounds; no masses, no organomegaly    Genitalia:   Deferred    Rectal:   Deferred    Extremities:  No cyanosis, clubbing or edema    Pulses:  2+ and symmetric    Skin:  No jaundice, rashes, or lesions    Lymph nodes:  No palpable cervical lymphadenopathy        Lab Results:   No visits with results within 1 Day(s) from this visit  Latest known visit with results is:   Lab on 05/09/2023   Component Date Value   • 24H Urine Volume 05/09/2023 1,350    • Calcium, 24H Urine 05/09/2023 147 15        Lab Results   Component Value Date    HCT 42 4 11/03/2022    HGB 13 1 11/03/2022    MCV 94 11/03/2022     11/03/2022    WBC 5 79 11/03/2022       Lab Results   Component Value Date    AGAP 1 (L) 04/07/2023    ALKPHOS 64 02/10/2023    ALT 26 02/10/2023    AST 17 02/10/2023    BUN 16 04/07/2023    CALCIUM 9 9 04/07/2023     (H) 04/07/2023    CO2 25 04/07/2023    CREATININE 0 74 04/07/2023    EGFR 82 04/07/2023    GLUC 107 09/07/2022    GLUF 94 04/07/2023    K 4 3 04/07/2023    SODIUM 135 04/07/2023    TBILI 0 32 02/10/2023    TP 7 2 02/10/2023       No results found for: \"CRP\"    Lab Results   Component Value Date    WJV1FBGRWBMH 1 900 04/18/2022       No results found for: \"FERRITIN\", \"IRON\", \"TIBC\"    Radiology Results:   No results found    "

## 2023-06-24 DIAGNOSIS — I10 ESSENTIAL (PRIMARY) HYPERTENSION: ICD-10-CM

## 2023-06-26 RX ORDER — LISINOPRIL 20 MG/1
30 TABLET ORAL DAILY
Qty: 90 TABLET | Refills: 0 | Status: SHIPPED | OUTPATIENT
Start: 2023-06-26

## 2023-08-28 ENCOUNTER — TELEPHONE (OUTPATIENT)
Age: 68
End: 2023-08-28

## 2023-08-28 DIAGNOSIS — I10 ESSENTIAL (PRIMARY) HYPERTENSION: ICD-10-CM

## 2023-08-28 NOTE — TELEPHONE ENCOUNTER
OA Questions for EGD  Date: 08/28/2023      Pre-Screening: BMI 33.09kg    Past EGD? If yes - Date: 11/2022  [facility]  Reason: [facility]OUTINE     SCHEDULING STAFF: If the patient is over 76years old, please schedule an office visit. ·      Does the patient want to see a gastroenterologist prior to their procedure to discuss any GI symptoms? NO    ·      Has the patient been hospitalized or had abdominal surgery in the past 6 months? NO    ·      Does the patient use supplemental oxygen? NO    ·      Does the patient take [Coumadin], [Lovenox], [Plavix], [Eliquis], [Xarelto], or other blood thinning medication? NO    ·      Has the patient had a stroke, cardiac event, or stent placed in the past year? NO     SCHEDULING STAFF: If patient answers NO to the above questions, then schedule the procedure. If patient answers YES to any of the above questions, then schedule an office appointment. ·       If a repeat EGD is belated and patient declines procedure à notify provider. PASSED OA    08/28/23  Screened by: Deepti Cotto  Has patient been referred for a routine screening Colonoscopy? yes  Is the patient between 43-73 years old? yes      Previous Colonoscopy yes   If yes:    Date: 2018    Facility: [facility]    Reason: ROUTINE      SCHEDULING STAFF: If the patient is between 45yrs-49yrs, please advise patient to confirm benefits/coverage with their insurance company for a routine screening colonoscopy, some insurance carriers will only cover at [facility] or older. If the patient is over 66years old, please schedule an office visit.      Does the patient want to see a Gastroenterologist prior to their procedure OR are they having any GI symptoms? no    Has the patient been hospitalized or had abdominal surgery in the past 6 months? no    Does the patient use supplemental oxygen? no    Does the patient take Coumadin, Lovenox, Plavix, Elliquis, Xarelto, or other blood thinning medication? no    Has the patient had a stroke, cardiac event, or stent placed in the past year? no    SCHEDULING STAFF: If patient answers NO to above questions, then schedule procedure. If patient answers YES to above questions, then schedule office appointment. If patient is between 45yrs - 49yrs, please advise patient that we will have to confirm benefits & coverage with their insurance company for a routine screening colonoscopy.       PASSED OA

## 2023-08-29 RX ORDER — LISINOPRIL 20 MG/1
30 TABLET ORAL DAILY
Qty: 90 TABLET | Refills: 0 | Status: SHIPPED | OUTPATIENT
Start: 2023-08-29

## 2023-08-31 ENCOUNTER — RA CDI HCC (OUTPATIENT)
Dept: OTHER | Facility: HOSPITAL | Age: 68
End: 2023-08-31

## 2023-08-31 NOTE — PROGRESS NOTES
[address] [facility] coding opportunities       Chart reviewed, no opportunity found: CHART REVIEWED, NO OPPORTUNITY FOUND        Patients Insurance        Commercial Insurance: Jey Sommer

## 2023-10-03 ENCOUNTER — APPOINTMENT (OUTPATIENT)
Dept: LAB | Facility: MEDICAL CENTER | Age: 68
End: 2023-10-03
Payer: COMMERCIAL

## 2023-10-03 DIAGNOSIS — I10 ESSENTIAL (PRIMARY) HYPERTENSION: ICD-10-CM

## 2023-10-03 DIAGNOSIS — E03.9 ACQUIRED HYPOTHYROIDISM: ICD-10-CM

## 2023-10-03 LAB
ALBUMIN SERPL BCP-MCNC: 4.3 G/DL (ref 3.5–5)
ALP SERPL-CCNC: 62 U/L (ref 34–104)
ALT SERPL W P-5'-P-CCNC: 16 U/L (ref 7–52)
ANION GAP SERPL CALCULATED.3IONS-SCNC: 8 MMOL/L
AST SERPL W P-5'-P-CCNC: 16 U/L (ref 13–39)
BASOPHILS # BLD AUTO: 0.06 THOUSANDS/ÂΜL (ref 0–0.1)
BASOPHILS NFR BLD AUTO: 1 % (ref 0–1)
BILIRUB SERPL-MCNC: 0.28 MG/DL (ref 0.2–1)
BUN SERPL-MCNC: 18 MG/DL (ref 5–25)
CALCIUM SERPL-MCNC: 10.6 MG/DL (ref 8.4–10.2)
CHLORIDE SERPL-SCNC: 103 MMOL/L (ref 96–108)
CO2 SERPL-SCNC: 26 MMOL/L (ref 21–32)
CREAT SERPL-MCNC: 0.7 MG/DL (ref 0.6–1.3)
EOSINOPHIL # BLD AUTO: 0.43 THOUSAND/ÂΜL (ref 0–0.61)
EOSINOPHIL NFR BLD AUTO: 5 % (ref 0–6)
ERYTHROCYTE [DISTWIDTH] IN BLOOD BY AUTOMATED COUNT: 12.3 % (ref 11.6–15.1)
GFR SERPL CREATININE-BSD FRML MDRD: 88 ML/MIN/1.73SQ M
GLUCOSE SERPL-MCNC: 85 MG/DL (ref 65–140)
HCT VFR BLD AUTO: 44.3 % (ref 34.8–46.1)
HGB BLD-MCNC: 14.2 G/DL (ref 11.5–15.4)
IMM GRANULOCYTES # BLD AUTO: 0.02 THOUSAND/UL (ref 0–0.2)
IMM GRANULOCYTES NFR BLD AUTO: 0 % (ref 0–2)
LYMPHOCYTES # BLD AUTO: 2.78 THOUSANDS/ÂΜL (ref 0.6–4.47)
LYMPHOCYTES NFR BLD AUTO: 34 % (ref 14–44)
MCH RBC QN AUTO: 30.9 PG (ref 26.8–34.3)
MCHC RBC AUTO-ENTMCNC: 32.1 G/DL (ref 31.4–37.4)
MCV RBC AUTO: 97 FL (ref 82–98)
MONOCYTES # BLD AUTO: 0.9 THOUSAND/ÂΜL (ref 0.17–1.22)
MONOCYTES NFR BLD AUTO: 11 % (ref 4–12)
NEUTROPHILS # BLD AUTO: 4.11 THOUSANDS/ÂΜL (ref 1.85–7.62)
NEUTS SEG NFR BLD AUTO: 49 % (ref 43–75)
NRBC BLD AUTO-RTO: 0 /100 WBCS
PLATELET # BLD AUTO: 288 THOUSANDS/UL (ref 149–390)
PMV BLD AUTO: 11 FL (ref 8.9–12.7)
POTASSIUM SERPL-SCNC: 4.2 MMOL/L (ref 3.5–5.3)
PROT SERPL-MCNC: 6.8 G/DL (ref 6.4–8.4)
RBC # BLD AUTO: 4.59 MILLION/UL (ref 3.81–5.12)
SODIUM SERPL-SCNC: 137 MMOL/L (ref 135–147)
TSH SERPL DL<=0.05 MIU/L-ACNC: 2.29 UIU/ML (ref 0.45–4.5)
WBC # BLD AUTO: 8.3 THOUSAND/UL (ref 4.31–10.16)

## 2023-10-03 PROCEDURE — 85025 COMPLETE CBC W/AUTO DIFF WBC: CPT

## 2023-10-03 PROCEDURE — 84443 ASSAY THYROID STIM HORMONE: CPT

## 2023-10-03 PROCEDURE — 36415 COLL VENOUS BLD VENIPUNCTURE: CPT

## 2023-10-03 PROCEDURE — 80053 COMPREHEN METABOLIC PANEL: CPT

## 2023-10-06 ENCOUNTER — OFFICE VISIT (OUTPATIENT)
Dept: FAMILY MEDICINE CLINIC | Facility: CLINIC | Age: 68
End: 2023-10-06
Payer: COMMERCIAL

## 2023-10-06 ENCOUNTER — IMMUNIZATIONS (OUTPATIENT)
Dept: FAMILY MEDICINE CLINIC | Facility: CLINIC | Age: 68
End: 2023-10-06
Payer: COMMERCIAL

## 2023-10-06 VITALS
HEART RATE: 72 BPM | DIASTOLIC BLOOD PRESSURE: 76 MMHG | WEIGHT: 203.6 LBS | SYSTOLIC BLOOD PRESSURE: 116 MMHG | HEIGHT: 65 IN | OXYGEN SATURATION: 97 % | BODY MASS INDEX: 33.92 KG/M2 | TEMPERATURE: 98.2 F

## 2023-10-06 DIAGNOSIS — E78.5 HYPERLIPIDEMIA, UNSPECIFIED HYPERLIPIDEMIA TYPE: Primary | ICD-10-CM

## 2023-10-06 DIAGNOSIS — E83.52 HYPERCALCEMIA: ICD-10-CM

## 2023-10-06 DIAGNOSIS — E03.9 ACQUIRED HYPOTHYROIDISM: ICD-10-CM

## 2023-10-06 DIAGNOSIS — E21.3 HYPERPARATHYROIDISM, UNSPECIFIED (HCC): ICD-10-CM

## 2023-10-06 DIAGNOSIS — I51.89 DIASTOLIC DYSFUNCTION: ICD-10-CM

## 2023-10-06 DIAGNOSIS — I10 ESSENTIAL (PRIMARY) HYPERTENSION: ICD-10-CM

## 2023-10-06 DIAGNOSIS — Z23 ENCOUNTER FOR IMMUNIZATION: Primary | ICD-10-CM

## 2023-10-06 DIAGNOSIS — R09.81 NASAL CONGESTION: ICD-10-CM
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
## 2023-10-06 DIAGNOSIS — Z87.891 HISTORY OF TOBACCO ABUSE: ICD-10-CM
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
## 2023-10-06 PROCEDURE — 90471 IMMUNIZATION ADMIN: CPT

## 2023-10-06 PROCEDURE — 99214 OFFICE O/P EST MOD 30 MIN: CPT | Performed by: NURSE PRACTITIONER

## 2023-10-06 PROCEDURE — 90662 IIV NO PRSV INCREASED AG IM: CPT

## 2023-10-06 RX ORDER — LISINOPRIL 20 MG/1
20 TABLET ORAL DAILY
Qty: 90 TABLET | Refills: 3 | Status: SHIPPED | OUTPATIENT
Start: 2023-10-06

## 2023-10-06 RX ORDER — LISINOPRIL 5 MG/1
5 TABLET ORAL DAILY
Qty: 90 TABLET | Refills: 3 | Status: SHIPPED | OUTPATIENT
Start: 2023-10-06

## 2023-10-06 RX ORDER — MONTELUKAST SODIUM 10 MG/1
10 TABLET ORAL
Qty: 90 TABLET | Refills: 3 | Status: SHIPPED | OUTPATIENT
Start: 2023-10-06

## 2023-10-06 RX ORDER — LEVOTHYROXINE SODIUM 0.05 MG/1
50 TABLET ORAL DAILY
Qty: 90 TABLET | Refills: 3 | Status: SHIPPED | OUTPATIENT
Start: 2023-10-06

## 2023-10-06 RX ORDER — SIMVASTATIN 10 MG
10 TABLET ORAL
Qty: 90 TABLET | Refills: 3 | Status: SHIPPED | OUTPATIENT
Start: 2023-10-06

## 2023-10-06 NOTE — ASSESSMENT & PLAN NOTE
Fall 2 months ago. Tripped over a throw rug. Was seen at [facility]. Head CT negative.   Has had no concerns since

## 2023-10-06 NOTE — ASSESSMENT & PLAN NOTE
Patient reports recent history of low blood pressure in the a.m. Systolic averaging 27G.  1 episode of intermittent dizziness-short-lived and resolved spontaneously. Self adjusted lisinopril to 5 mg in the a.m./with continued 20 mg p.m. Reports home pressures have been averaging 1 teens over 70s-80s since. Feels well. Has had no further dizziness. Note BP for me today: 116/76. We will continue her treatment the same as of now: 5 AM/20 p.m. Advised continued periodic home pressure checks. Follow-up with any problems and/or concerns. Immediate follow-up with any further dizziness episodes. Discussed we would consider Holter monitor at that time. Currently following with cardiology as well.   Next follow-up is now due

## 2023-10-06 NOTE — PROGRESS NOTES
[facility]    ASSESSMENT AND PLAN     1. Hyperlipidemia, unspecified hyperlipidemia type  Assessment & Plan:  Ports compliance with simvastatin 10. Latest lipid in April: 177/216/49/85. No myalgias. Recheck at one year. Fasting orders for April 2024 placed    Orders:  -     simvastatin (ZOCOR) 10 mg tablet; Take 1 tablet (10 mg total) by mouth daily at bedtime    2. Nasal congestion  -     montelukast (SINGULAIR) 10 mg tablet; Take 1 tablet (10 mg total) by mouth daily at bedtime    3. Essential (primary) hypertension  Assessment & Plan:  Patient reports recent history of low blood pressure in the a.m. Systolic averaging 05C.  1 episode of intermittent dizziness-short-lived and resolved spontaneously. Self adjusted lisinopril to 5 mg in the a.m./with continued 20 mg p.m. Reports home pressures have been averaging 1 teens over 70s-80s since. Feels well. Has had no further dizziness. Note BP for me today: 116/76. We will continue her treatment the same as of now: 5 AM/20 p.m. Advised continued periodic home pressure checks. Follow-up with any problems and/or concerns. Immediate follow-up with any further dizziness episodes. Discussed we would consider Holter monitor at that time. Currently following with cardiology as well. Next follow-up is now due    Orders:  -     lisinopril (ZESTRIL) 20 mg tablet; Take 1 tablet (20 mg total) by mouth daily    4. Acquired hypothyroidism  Assessment & Plan:  TSH remains stable: 2.2  Continue current treatment: Levothyroxine 50  Recheck TSH 6 months    Orders:  -     levothyroxine 50 mcg tablet; Take 1 tablet (50 mcg total) by mouth in the morning  -     lisinopril (ZESTRIL) 5 mg tablet; Take 1 tablet (5 mg total) by mouth daily    5. Hypercalcemia  Assessment & Plan:  Monitoring/managed by endocrinology      6. Diastolic dysfunction  Assessment & Plan:  Following with cardiology      7.  Hyperparathyroidism, unspecified ([address])  Assessment & Plan:  Managed by endocrinology      8. Fall, subsequent encounter  Assessment & Plan:  Fall 2 months ago. Tripped over a throw rug. Was seen at [facility]. Head CT negative. Has had no concerns since      9. History of tobacco abuse  Assessment & Plan:  Screening lung CT still pending in system     Tobacco history: 20.5 pack per year times 40 years.  Quit 8 years ago.  Admittedly vaped for short time after quitting.  Nothing in years.       Note Abdomenal/pelvis CT 2019:  Incidentally noted multiple subcentimeter pulmonary nodules in visualized lungs measuring up to 6 mm in average axial dimension.  Based on current Fleischner Society 2017 Guidelines on incidental pulmonary nodule, followup non-contrast CT is   recommended at 6-12 months from the initial examination and, if stable at that time, an additional followup is recommended for 18-24 months from the initial examination.  Recommend obtaining baseline chest CT and further follow-up per described guideline       Falls Plan of Care: One-time fall, due to tripping over a rug. No other fall risks identified today    Urinary Incontinence Plan of Care: counseling topics discussed: practice Kegel (pelvic floor strengthening) exercises and preventing constipation. Lung Cancer Screening Shared Decision Making: I discussed with her that she is a candidate for lung cancer CT screening. The following Shared Decision-Making points were covered:  1. Benefits of screening were discussed, including the rates of reduction in death from lung cancer and other causes. Harms of screening were reviewed, including false positive tests, radiation exposure levels, risks of invasive procedures, risks of complications of screening, and risk of overdiagnosis. 2. I counseled on the importance of adherence to annual lung cancer LDCT screening, impact of co-morbidities, and ability or willingness to undergo diagnosis and treatment.   3. I counseled on the importance of maintaining abstinence as a former smoker or was counseled on the importance of smoking cessation if a current smoker    Review of Eligibility Criteria: She meets all of the criteria for Lung Cancer Screening.   - She is 77 y.o.   - She has 20 pack year tobacco history and is a current smoker or has quit within the past 15 years  - She presents no signs or symptoms of lung cancer    After discussion, the patient decided to elect lung cancer screening. Note: [facility] health evaluation form completed for providers over 70 within the network. Fax to number provided. Copy scanned into record    SUBJECTIVE       Patient ID: Marco Antonio Ramirez is a 77 y.o. female. Chief Complaint   Patient presents with   • Follow-up       HISTORY OF PRESENT ILLNESS    Routine follow-up        The following portions of the patient's history were reviewed and updated as appropriate: allergies, current medications, past family history, past medical history, past social history, past surgical history, and problem list.    REVIEW OF SYSTEMS  Review of Systems   Constitutional: Negative. Negative for activity change, appetite change, fatigue and unexpected weight change. HENT: Negative. Eyes: Negative. Respiratory: Negative. Cardiovascular: Negative. Negative for chest pain and palpitations. Gastrointestinal: Negative. Genitourinary: Negative. Musculoskeletal: Negative. Skin: Negative. Neurological: Negative. Negative for headaches. Dizziness: One-time episode. Psychiatric/Behavioral: Negative.         OBJECTIVE      VITAL SIGNS  /76 (BP Location: Right arm, Patient Position: Sitting, Cuff Size: Adult)   Pulse 72   Temp 98.2 °F (36.8 °C)   Ht 5' 5" (1.651 m)   Wt 92.4 kg (203 lb 9.6 oz)   SpO2 97%   BMI 33.88 kg/m²     CURRENT MEDICATIONS    Current Outpatient Medications:   •  ASPIRIN 81 PO, Take by mouth daily, Disp: , Rfl:   •  Cholecalciferol (Vitamin D) 50 MCG (2000 UT) CAPS, 2,000 Units daily, Disp: , Rfl:   •  famotidine (PEPCID) 20 mg tablet, Start taking 20mg daily. If still having symptoms after 2 weeks, increase dose to 20mg bid., Disp: 90 tablet, Rfl: 3  •  fluorouracil (EFUDEX) 5 % cream, Apply 1 application topically daily as needed, Disp: , Rfl:   •  ketoconazole (NIZORAL) 2 % cream, Apply topically daily Athletes foot, Disp: , Rfl:   •  Latanoprostene Bunod (Vyzulta) 0.024 % SOLN, Administer 1 drop to both eyes in the morning, Disp: , Rfl:   •  levothyroxine 50 mcg tablet, Take 1 tablet (50 mcg total) by mouth in the morning, Disp: 90 tablet, Rfl: 3  •  lisinopril (ZESTRIL) 20 mg tablet, Take 1 tablet (20 mg total) by mouth daily, Disp: 90 tablet, Rfl: 3  •  lisinopril (ZESTRIL) 5 mg tablet, Take 1 tablet (5 mg total) by mouth daily, Disp: 90 tablet, Rfl: 3  •  montelukast (SINGULAIR) 10 mg tablet, Take 1 tablet (10 mg total) by mouth daily at bedtime, Disp: 90 tablet, Rfl: 3  •  simvastatin (ZOCOR) 10 mg tablet, Take 1 tablet (10 mg total) by mouth daily at bedtime, Disp: 90 tablet, Rfl: 3  •  ALPRAZolam (XANAX) 0.25 mg tablet, Take 1 tablet by mouth as needed (Patient not taking: Reported on 10/6/2023), Disp: , Rfl:       PHYSICAL EXAMINATION   Physical Exam  Vitals and nursing note reviewed. Constitutional:       General: She is not in acute distress. Appearance: Normal appearance. She is well-developed and well-groomed. She is not ill-appearing. HENT:      Head: Normocephalic and atraumatic. Right Ear: Tympanic membrane, ear canal and external ear normal.      Left Ear: Tympanic membrane, ear canal and external ear normal.      Mouth/Throat:      Mouth: Mucous membranes are moist.   Eyes:      Pupils: Pupils are equal, round, and reactive to light. Neck:      Vascular: No carotid bruit. Cardiovascular:      Rate and Rhythm: Normal rate and regular rhythm. Pulses:           Posterior tibial pulses are 2+ on the right side and 2+ on the left side.       Heart sounds: Normal heart sounds. Pulmonary:      Effort: Pulmonary effort is normal. No respiratory distress. Breath sounds: Normal breath sounds and air entry. Musculoskeletal:      Right lower leg: No edema. Left lower leg: No edema. Lymphadenopathy:      Cervical: No cervical adenopathy. Skin:     General: Skin is warm and dry. Neurological:      General: No focal deficit present. Mental Status: She is alert and oriented to person, place, and time. Psychiatric:         Attention and Perception: Attention normal.         Mood and Affect: Mood normal.         Behavior: Behavior normal.         Thought Content:  Thought content normal.         Judgment: Judgment normal.

## 2023-10-06 NOTE — ASSESSMENT & PLAN NOTE
Ports compliance with simvastatin 10. Latest lipid in April: 177/216/49/85. No myalgias. Recheck at one year.    Fasting orders for April 2024 placed

## 2023-10-06 NOTE — ASSESSMENT & PLAN NOTE
Screening lung CT still pending in system     Tobacco history: 20.5 pack per year times 40 years.  Quit 8 years ago.  Admittedly vaped for short time after quitting.  Nothing in years.       Note Abdomenal/pelvis CT 2019:  Incidentally noted multiple subcentimeter pulmonary nodules in visualized lungs measuring up to 6 mm in average axial dimension.  Based on current Fleischner Society 2017 Guidelines on incidental pulmonary nodule, followup non-contrast CT is   recommended at 6-12 months from the initial examination and, if stable at that time, an additional followup is recommended for 18-24 months from the initial examination.  Recommend obtaining baseline chest CT and further follow-up per described guideline

## 2023-11-06 NOTE — ASSESSMENT & PLAN NOTE
Continue levothyroxine 
Reassess after hydrochlorothiazide has been discontinued 
This may be related to primary hyperparathyroidism or hydrochlorothiazide  I have asked her to stop the hydrochlorothiazide and then check CMP, phosphorus and intact PTH  If the results show parathyroid mediated hypercalcemia, I will plan to do a 24-hour urine for calcium 
H

## 2023-11-21 ENCOUNTER — TELEMEDICINE (OUTPATIENT)
Dept: FAMILY MEDICINE CLINIC | Facility: CLINIC | Age: 68
End: 2023-11-21
Payer: COMMERCIAL

## 2023-11-21 ENCOUNTER — HOSPITAL ENCOUNTER (OUTPATIENT)
Dept: CT IMAGING | Facility: HOSPITAL | Age: 68
Discharge: HOME | End: 2023-11-21
Payer: COMMERCIAL

## 2023-11-21 DIAGNOSIS — R14.0 ABDOMINAL BLOATING: ICD-10-CM

## 2023-11-21 DIAGNOSIS — R10.32 LEFT LOWER QUADRANT ABDOMINAL PAIN: Primary | ICD-10-CM

## 2023-11-21 DIAGNOSIS — R19.5 CHANGE IN STOOL: ICD-10-CM

## 2023-11-21 DIAGNOSIS — R10.32 LEFT LOWER QUADRANT ABDOMINAL PAIN: ICD-10-CM

## 2023-11-21 PROCEDURE — 74177 CT ABD & PELVIS W/CONTRAST: CPT

## 2023-11-21 PROCEDURE — 99213 OFFICE O/P EST LOW 20 MIN: CPT | Performed by: NURSE PRACTITIONER

## 2023-11-21 PROCEDURE — G1004 CDSM NDSC: HCPCS

## 2023-11-21 RX ADMIN — IOHEXOL 100 ML: 350 INJECTION, SOLUTION INTRAVENOUS at 19:52

## 2023-11-21 NOTE — PROGRESS NOTES
Virtual Regular Visit    Verification of patient location:    Patient is located at Home in the following state in which I hold an active license [place]      Assessment/Plan:    Problem List Items Addressed This Visit          Other    Left lower quadrant abdominal pain - Primary     Symptoms reviewed at length  Differentials: Diverticulitis versus constipation  We will check CT today  She does have her next colonoscopy scheduled for January, but will refer to GI sooner with any problems or concerns noted on scan today  Order for CBC  Recent CMP completed in October: Kidney function stable         Relevant Orders    CT abdomen pelvis w contrast     Other Visit Diagnoses       Abdominal bloating        Relevant Orders    CT abdomen pelvis w contrast    Change in stool        Relevant Orders    CT abdomen pelvis w contrast              Urinary Incontinence Plan of Care: counseling topics discussed: practice Kegel (pelvic floor strengthening) exercises, use restroom every 2 hours and preventing constipation. Denies any urine incontinency, but does report increased frequency lately. Reports having suprapubic pressure. Is attributing it to her abdominal pain/pressure/bloating. .         Reason for visit is   Chief Complaint   Patient presents with    Virtual Regular Visit          Encounter provider MILLY Diamond    Provider located at [facility]  [address]  [address]  [phone]      Recent Visits  No visits were found meeting these conditions. Showing recent visits within past 7 days and meeting all other requirements  Today's Visits  Date Type Provider Dept   11/21/23 Telemedicine MILLY Diamond [facility]   Showing today's visits and meeting all other requirements  Future Appointments  No visits were found meeting these conditions.   Showing future appointments within next 150 days and meeting all other requirements The patient was identified by name and date of birth. Helen Jimenez was informed that this is a telemedicine visit and that the visit is being conducted through Telephone. My office door was closed. No one else was in the room. She acknowledged consent and understanding of privacy and security of the video platform. The patient has agreed to participate and understands they can discontinue the visit at any time. Patient is aware this is a billable service. Subjective  Helen Jimenez is a 77 y.o. female  . Acute visit   Pt presents today for virtual visit. Reports new onset constipation and abdominal "fullness". Her bowel pattern is not normal.  She normally has a bowel movement daily, but reports she has been unable to have a normal bowel movement in several days. Has been having occasional stooling, but reports her stools have been hard and "pebble" like. No blood or mucus that she has visualized. Reports constant feeling of needing to evacuate her bowels yet she strains, and will only pass a small round ball. Reports increased urination, due to pressure on her bladder. Her urine is clear and without smell. Reports pain in her left lower quadrant, traveling up her left side of her abdomen. Reports pain/bloating feeling, but reports she is passing gas. Denies eating anything out of the ordinary over the last several days. No new medications. Reports she has been drinking plenty of fluids. Has had intermittent chills over the last several days, but no fever that she is aware of. Generalized fatigue  Last colonoscopy 2019: Colonic polyps and diverticula throughout. She is due for repeat colonoscopy, which she does have scheduled for January.          Past Medical History:   Diagnosis Date    Allergic rhinitis     Disease of thyroid gland     GERD (gastroesophageal reflux disease)     Glaucoma     Hypercholesteremia     Hypertension     Hypertension        Past Surgical History:   Procedure Laterality Date    HYSTERECTOMY      OOPHORECTOMY      TONSILLECTOMY  childhood       Current Outpatient Medications   Medication Sig Dispense Refill    ALPRAZolam (XANAX) 0.25 mg tablet Take 1 tablet by mouth as needed (Patient not taking: Reported on 10/6/2023)      ASPIRIN 81 PO Take by mouth daily      Cholecalciferol (Vitamin D) 50 MCG (2000 UT) CAPS 2,000 Units daily      famotidine (PEPCID) 20 mg tablet Start taking 20mg daily. If still having symptoms after 2 weeks, increase dose to 20mg bid. 90 tablet 3    fluorouracil (EFUDEX) 5 % cream Apply 1 application topically daily as needed      ketoconazole (NIZORAL) 2 % cream Apply topically daily Athletes foot      Latanoprostene Bunod (Vyzulta) 0.024 % SOLN Administer 1 drop to both eyes in the morning      levothyroxine 50 mcg tablet Take 1 tablet (50 mcg total) by mouth in the morning 90 tablet 3    lisinopril (ZESTRIL) 20 mg tablet Take 1 tablet (20 mg total) by mouth daily 90 tablet 3    lisinopril (ZESTRIL) 5 mg tablet Take 1 tablet (5 mg total) by mouth daily 90 tablet 3    montelukast (SINGULAIR) 10 mg tablet Take 1 tablet (10 mg total) by mouth daily at bedtime 90 tablet 3    simvastatin (ZOCOR) 10 mg tablet Take 1 tablet (10 mg total) by mouth daily at bedtime 90 tablet 3     No current facility-administered medications for this visit. Allergies   Allergen Reactions    Doxycycline GI Intolerance    Erythromycin GI Intolerance     Sensitivity to medication    sensitivity  Sensitivity to medication  sensitivity      Norvasc [Amlodipine] Swelling       Review of Systems   Constitutional:  Positive for chills and fatigue. Negative for activity change and appetite change. Fever: uncertain. Gastrointestinal:  Positive for abdominal distention, abdominal pain and constipation. Negative for nausea and vomiting. Video Exam    There were no vitals filed for this visit.     Physical exam able to be completed today, as this was a virtual phone visit    Visit Time  Total Visit Duration: 15

## 2023-11-21 NOTE — ASSESSMENT & PLAN NOTE
Symptoms reviewed at length  Differentials: Diverticulitis versus constipation  We will check CT today  She does have her next colonoscopy scheduled for January, but will refer to GI sooner with any problems or concerns noted on scan today  Order for CBC  Recent CMP completed in October: Kidney function stable

## 2023-11-22 DIAGNOSIS — Q61.3 POLYCYSTIC KIDNEY, UNSPECIFIED: Primary | ICD-10-CM

## 2023-11-22 DIAGNOSIS — R35.0 URINARY FREQUENCY: ICD-10-CM

## 2023-11-22 DIAGNOSIS — N28.9 KIDNEY LESION, NATIVE, RIGHT: ICD-10-CM

## 2023-11-26 DIAGNOSIS — R39.9 UTI SYMPTOMS: Primary | ICD-10-CM

## 2023-11-26 RX ORDER — AMOXICILLIN AND CLAVULANATE POTASSIUM 875; 125 MG/1; MG/1
1 TABLET, FILM COATED ORAL EVERY 12 HOURS SCHEDULED
Qty: 10 TABLET | Refills: 0 | Status: SHIPPED | OUTPATIENT
Start: 2023-11-26 | End: 2023-12-01

## 2023-11-27 ENCOUNTER — TELEPHONE (OUTPATIENT)
Age: 68
End: 2023-11-27

## 2023-11-27 NOTE — TELEPHONE ENCOUNTER
Asc  ASC Screening    ASC Screening  BMI > than 45: No  Are you currently pregnant?: No  Do you rely on a wheelchair for mobility?: No  Do you need oxygen during the day?: No  Have you ever been informed by anesthesia that you have a difficult airway?: No  Have you been diagnosed with End Stage Renal Disease (ESRD)?: No  Are you actively on dialysis?: No  Have you been diagnosed with Pulmonary Hypertension?: No  Do you have a pacemaker or an Automatic Implantable Cardioverter Defibrillator (AICD)?: No  Have you ever had an organ transplant?: No  Have you had a stroke, heart attack, myocardial infarction (MI) within the last 6 months?: No  Have you ever been diagnosed with Aortic Stenosis?: No  Have you ever been diagnosed  with Congestive Heart Failure?: No  Have you ever been diagnosed with a heart valve disease?: No  Are you Diabetic?: No  If you are Diabetic, has your A1C been greater than 12 within the last six months?: N/A

## 2023-11-27 NOTE — TELEPHONE ENCOUNTER
Scheduled date of colonoscopy (as of today): 3-  Physician performing colonoscopy: Dr. Chandan Givens  Location of colonoscopy: [facility]   Bowel prep reviewed with patient: miralax dulcolax reviewed and sent via [facility]   Instructions reviewed with patient by: JACOB   Clearances: N/A

## 2024-02-21 ENCOUNTER — TELEPHONE (OUTPATIENT)
Age: 69
End: 2024-02-21

## 2024-02-21 NOTE — TELEPHONE ENCOUNTER
Scheduled date of colonoscopy (as of today):  05.14.2024    Physician performing colonoscopy:  Dr. Montgomery    Location of colonoscopy:  [facility]    Bowel prep reviewed with patient:  Miralax/Dulcolax

## 2024-02-21 NOTE — TELEPHONE ENCOUNTER
COLONOSCOPY  MIRALAX/Dulcolax Bowel Preparation Instructions    The OR/GI Lab will contact you the evening prior to your procedure with your exact arrival time.    Our practice requires a 1 week notice for any cancellations or rescheduling. We kindly ask that you immediately notify us of any changes including any new medications that are prescribed. Thank you for your cooperation.     WEEK BEFORE YOUR PROCEDURE:  Stop taking Iron tablets.  5 days prior, AVOID vegetables and fruits with skins or seeds, nuts, corn, popcorn and whole grain breads.   Purchase: One (1) 238-gram container of Miralax (polyethylene glycol 3350), four (4) 5 mg Dulcolax (bisacodyl) tablets, and one (1) 64-ounce bottle of Gatorade (sports drink) - no red, orange, or purple. These may be purchased at any pharmacy without a prescription. Generic products are permissible.   Arrange responsible transportation for day of the procedure.     DAY BEFORE THE PROCEDURE:   CLEAR liquids only for entire day prior. Nothing red, orange or purple.    You MAY have:                                                               Soda  Water  Broth Gatorade  Jello  Popsicles Coffee/tea without milk/creamer     YOU MAY NOT HAVE:  Solid foods   Milk and milk products    Juice with pulp    BOWEL PREPARATION:  Includes: One (1) 238-gram container of Miralax (polyethylene glycol 3350), four (4) 5 mg Dulcolax (bisacodyl) tablets, and one (1) 64-ounce bottle of Gatorade (sports drink).  Preparation may be refrigerated.  Entire bowel prep should be completed.     Afternoon before the procedure (2:00 pm - 5:00 pm):    Take two (2) 5 mg Dulcolax laxative tablets.     Evening before the procedure (6:00 pm):  Mix entire container of Miralax with one (1) 64-ounce bottle of Gatorade and shake until all medication is dissolved.   Begin drinking solution. Drink an eight (8) ounce cup every 10-15 minutes until you have consumed half (32 ounces) of the solution.  Refrigerate  remaining solution.    Night before the procedure (8:00 pm):  Take two (2) 5 mg Dulcolax laxative tablets.     Beginning 5 hours before your procedure:  Drink the remaining amount of prepared solution (32 ounces).  Drink an eight (8) ounce cup every 10-15 minutes until you have consumed the remaining solution.     Bowel prep should be completed 4 hours prior to procedure time.    NOTHING TO EAT OR DRINK AFTER MIDNIGHT- EXCEPT FOR YOUR PREP    DAY OF THE PROCEDURE:  You may brush your teeth.  Leave all jewelry at home.  Please arrive for your procedure as indicated by the OR / GI Lab / Endoscopy Unit. The hospital will contact you the day before with your exact arrival time.   Make sure you have arranged ahead of time for a responsible adult (18 or older) to accompany and drive you home after the procedure.  Please discuss any transportation concerns with our staff prior to your procedure.    The effects of the anesthesia can persist for 24 hours.  After receiving the sedation, you must exercise caution before engaging in any activity that could harm yourself and others (such as driving a car).  Do not make any important decisions or do not drink any alcoholic beverages during this time period.  After your procedure, you may have anything you'd like to eat or drink.  You will probably want to start with something light.  Please include plenty of fluids.  Avoid items that cause gas such as sodas and salads.    SPECIAL INSTRUCTIONS:    For patients currently taking blood thinners and/or antiplatelet therapy our office will contact the prescribing provider.  Our office will contact you with any required changes to your medication regimen.     Blood thinner (i.e. - Coumadin, Pradaxa, Lovenox, Xarelto, Eliquis)  ?  Continue (Do Not Stop)  ? Stop______________for_____________days prior to the procedure.    Antiplatelet (i.e. - Plavix, Aggrenox, Effient, Brilinta)  ?  Continue (Do Not  Stop)  ? Stop______________for_____________days prior to the procedure.       Diabetes:   If you are Diabetic, please see separate Diabetic Instruction Sheet.          Prescribed medications:  Do not stop your aspirin, or any of your other medications (unless instructed otherwise).    Take the rest of your prescribed medications with small sips of water at least 2 hours prior to your procedure.      For any questions or concerns related to your bowel preparation or pre-procedure instructions, please contact our office at [phone].  Thank you for choosing [facility]!

## 2024-03-08 ENCOUNTER — OFFICE VISIT (OUTPATIENT)
Dept: CARDIOLOGY CLINIC | Facility: CLINIC | Age: 69
End: 2024-03-08
Payer: COMMERCIAL

## 2024-03-08 VITALS
HEART RATE: 63 BPM | WEIGHT: 212 LBS | SYSTOLIC BLOOD PRESSURE: 138 MMHG | BODY MASS INDEX: 35.28 KG/M2 | DIASTOLIC BLOOD PRESSURE: 88 MMHG

## 2024-03-08 DIAGNOSIS — I10 ESSENTIAL (PRIMARY) HYPERTENSION: ICD-10-CM

## 2024-03-08 DIAGNOSIS — E78.2 MIXED HYPERLIPIDEMIA: ICD-10-CM

## 2024-03-08 DIAGNOSIS — E66.9 OBESITY, CLASS I, BMI 30-34.9: ICD-10-CM

## 2024-03-08 DIAGNOSIS — I34.0 NONRHEUMATIC MITRAL (VALVE) INSUFFICIENCY: ICD-10-CM

## 2024-03-08 DIAGNOSIS — I51.89 DIASTOLIC DYSFUNCTION: Primary | ICD-10-CM

## 2024-03-08 PROCEDURE — 93000 ELECTROCARDIOGRAM COMPLETE: CPT | Performed by: INTERNAL MEDICINE

## 2024-03-08 PROCEDURE — 99214 OFFICE O/P EST MOD 30 MIN: CPT | Performed by: INTERNAL MEDICINE

## 2024-03-08 NOTE — PROGRESS NOTES
"  Cardiology Office Note  MD Daniel Marsh MD Jason Kaplan, DO, [facility]  MD Benito Edmond DO, [facility]  Elias Stewart DO, [facility]  ----------------------------------------------------------------  [address] [address]    Mariel Hines 69 y.o. female MRN: [id number]  Unit/Bed#:  Encounter: [id number]      History of Present Illness:  It was a pleasure to see Mariel Hines in the office today for follow-up CV evaluation. She has a past medical history of hypertension, hypothyroid, dyslipidemia, obesity and tobacco use.  She established care with us in April 2023.  She was seen in the office due to findings of diastolic dysfunction on echocardiogram that was performed in February 2023.  The echocardiogram was performed due to a history of \"murmur\" that was heard in the past.  The echocardiogram demonstrated normal left ventricular systolic function without significant valvular disease aside from the diastolic dysfunction.  Overall, the patient did admit to a longstanding history of hypertension, but her blood pressures were well controlled on a combination of lisinopril and hydrochlorothiazide.  Due to hypercalcemia, her hydrochlorothiazide was discontinued and she was maintained on lisinopril alone for her blood pressure control.  Overall, she continues to feel in her usual state of health.  Denies any significant medical changes.  She denies any chest pain, pressure, tightness or squeezing.  Denies lightheadedness, dizziness or palpitations.  Denies lower extremity swelling orthopnea or paroxysmal nocturnal dyspnea.  Admits to occasional heartburn, but improved from prior.    Review of Systems:  Review of Systems   Constitutional: Negative for decreased appetite, fever, weight gain and weight loss.   HENT:  Negative for congestion and sore throat.    Eyes:  Negative for visual disturbance.   Cardiovascular:  Negative for chest " pain, dyspnea on exertion, leg swelling, near-syncope and palpitations.   Respiratory:  Negative for cough and shortness of breath.    Hematologic/Lymphatic: Negative for bleeding problem.   Skin:  Negative for rash.   Musculoskeletal:  Negative for myalgias and neck pain.   Gastrointestinal:  Negative for abdominal pain and nausea.   Neurological:  Negative for light-headedness and weakness.   Psychiatric/Behavioral:  Negative for depression.        Past Medical History:   Diagnosis Date    Allergic rhinitis     Disease of thyroid gland     GERD (gastroesophageal reflux disease)     Glaucoma     Hypercholesteremia     Hypertension     Hypertension        Past Surgical History:   Procedure Laterality Date    HYSTERECTOMY      OOPHORECTOMY      TONSILLECTOMY  childhood       Social History     Socioeconomic History    Marital status: Single     Spouse name: Not on file    Number of children: Not on file    Years of education: Not on file    Highest education level: Not on file   Occupational History    Not on file   Tobacco Use    Smoking status: Former     Current packs/day: 0.00     Average packs/day: 0.5 packs/day for 41.0 years (20.5 ttl pk-yrs)     Types: Cigarettes     Start date: 1973     Quit date: 1/1/2014     Years since quitting: 10.1    Smokeless tobacco: Never   Vaping Use    Vaping status: Never Used   Substance and Sexual Activity    Alcohol use: Not Currently     Comment: social     Drug use: Never    Sexual activity: Not Currently     Partners: Male   Other Topics Concern    Not on file   Social History Narrative    Not on file     Social Determinants of Health     Financial Resource Strain: Not on file   Food Insecurity: Not on file   Transportation Needs: Not on file   Physical Activity: Not on file   Stress: Not on file   Social Connections: Not on file   Intimate Partner Violence: Not on file   Housing Stability: Not on file       Family History   Problem Relation Age of Onset    Cancer Father      Colon cancer Father     Skin cancer Father     Diabetes Mother     Heart attack Mother     Heart disease Mother     Hypertension Mother     Diabetes type II Mother     Hypothyroidism Mother     Hypertension Sister     Hypothyroidism Sister     Prostate cancer Brother        Allergies   Allergen Reactions    Doxycycline GI Intolerance    Erythromycin GI Intolerance     Sensitivity to medication    sensitivity  Sensitivity to medication  sensitivity      Norvasc [Amlodipine] Swelling         Current Outpatient Medications:     ASPIRIN 81 PO, Take by mouth daily, Disp: , Rfl:     famotidine (PEPCID) 20 mg tablet, Start taking 20mg daily. If still having symptoms after 2 weeks, increase dose to 20mg bid., Disp: 90 tablet, Rfl: 3    ketoconazole (NIZORAL) 2 % cream, Apply topically daily Athletes foot, Disp: , Rfl:     Latanoprostene Bunod (Vyzulta) 0.024 % SOLN, Administer 1 drop to both eyes in the morning, Disp: , Rfl:     levothyroxine 50 mcg tablet, Take 1 tablet (50 mcg total) by mouth in the morning, Disp: 90 tablet, Rfl: 3    lisinopril (ZESTRIL) 20 mg tablet, Take 1 tablet (20 mg total) by mouth daily, Disp: 90 tablet, Rfl: 3    lisinopril (ZESTRIL) 5 mg tablet, Take 1 tablet (5 mg total) by mouth daily, Disp: 90 tablet, Rfl: 3    montelukast (SINGULAIR) 10 mg tablet, Take 1 tablet (10 mg total) by mouth daily at bedtime, Disp: 90 tablet, Rfl: 3    simvastatin (ZOCOR) 10 mg tablet, Take 1 tablet (10 mg total) by mouth daily at bedtime, Disp: 90 tablet, Rfl: 3    ALPRAZolam (XANAX) 0.25 mg tablet, Take 1 tablet by mouth as needed (Patient not taking: Reported on 10/6/2023), Disp: , Rfl:     Cholecalciferol (Vitamin D) 50 MCG (2000 UT) CAPS, 2,000 Units daily (Patient not taking: Reported on 3/8/2024), Disp: , Rfl:     fluorouracil (EFUDEX) 5 % cream, Apply 1 application topically daily as needed (Patient not taking: Reported on 3/8/2024), Disp: , Rfl:     Vitals:    03/08/24 1057   BP: 138/88   BP Location:  Left arm   Patient Position: Sitting   Cuff Size: Adult   Pulse: 63   Weight: 96.2 kg (212 lb)       Body mass index is 35.28 kg/m².    PHYSICAL EXAMINATION:  Gen: Awake, Alert, NAD   Head/eyes: AT/NC, pupils equal and round, Anicteric  ENT: mmm  Neck: Supple, No elevated JVP, trachea midline  Resp: CTA bilaterally no w/r/r  CV: RRR +S1, S2, No m/r/g  Abd: Soft, obese, NT/ND + BS  Ext: no LE edema bilaterally  Neuro: Follows commands, moves all extermities  Psych: Appropriate affect, happy mood, pleasant attitude, non-combative  Skin: warm; no rash, erythema or venous stasis changes on exposed skin    --------------------------------------------------------------------------------  TREADMILL STRESS  No results found for this or any previous visit.     --------------------------------------------------------------------------------  NUCLEAR STRESS TEST: No results found for this or any previous visit.    No results found for this or any previous visit.      --------------------------------------------------------------------------------  CATH:  No results found for this or any previous visit.    --------------------------------------------------------------------------------  ECHO:   No results found for this or any previous visit.    No results found for this or any previous visit.    --------------------------------------------------------------------------------  HOLTER  No results found for this or any previous visit.    No results found for this or any previous visit.    --------------------------------------------------------------------------------  CAROTIDS  No results found for this or any previous visit.     --------------------------------------------------------------------------------  ECGs:  Results for orders placed or performed in visit on 03/08/24   POCT ECG    Impression    Sinus rhythm 63 bpm, normal ECG          Lab Results   Component Value Date    WBC 8.30 10/03/2023    HGB 14.2 10/03/2023     "HCT 44.3 10/03/2023    MCV 97 10/03/2023     10/03/2023      Lab Results   Component Value Date    SODIUM 137 10/03/2023    K 4.2 10/03/2023     10/03/2023    CO2 26 10/03/2023    BUN 18 10/03/2023    CREATININE 0.70 10/03/2023    GLUC 85 10/03/2023    CALCIUM 10.6 (H) 10/03/2023      No results found for: \"HGBA1C\"   No results found for: \"CHOL\"  Lab Results   Component Value Date    HDL 49 (L) 04/07/2023    HDL 45 (L) 11/03/2022    HDL 43 (L) 12/30/2021     Lab Results   Component Value Date    LDLCALC 85 04/07/2023    LDLCALC 163 (H) 11/03/2022    LDLCALC 79 12/30/2021     Lab Results   Component Value Date    TRIG 216 (H) 04/07/2023    TRIG 340 (H) 11/03/2022    TRIG 227 (H) 12/30/2021     No results found for: \"CHOLHDL\"   No results found for: \"INR\", \"PROTIME\"     1. Diastolic dysfunction    2. Essential (primary) hypertension  -     POCT ECG    3. Mixed hyperlipidemia    4. Obesity, Class I, BMI 30-34.9    5. Nonrheumatic mitral (valve) insufficiency          IMPRESSION:  Diastolic dysfunction  Hypertension  LVEF 65%, mild LVH, grade 1 diastolic dysfunction, AV sclerosis, MV sclerosis, trace MR/TR/UT, February 2023  Dyslipidemia  Obesity  Hypothyroidism  History of tobacco use - quit 2003  History of hypercalcemia  Family history of CAD    PLAN:  It was a pleasure to see Mac in the office today for follow-up CV evaluation.  She is here today for routine CV follow-up.  Since her last encounter, she continues to feel well overall.  She has no chest pain concerning for angina and no signs or symptoms of heart failure.  Clinically she examines to be euvolemic.  Blood pressure and heart rate are currently stable.  She is tolerating her current medications without any reported adverse effects.  The patient can perform greater than 4 METS on a daily basis without significant exertional symptoms.  ECG is nonischemic.  Based on her clinical presentation, I have the following recommendations:    1.  Due " "to her family history of coronary disease and personal history of dyslipidemia, we have elected to try a CT coronary calcium score to assess her burden of calcified atherosclerotic cardiovascular disease.  2.  Recommend 30 minutes a day, 5 days a week of moderate intensity activity to build cardiovascular endurance.  3.  Would encourage a heart healthy diet low in sodium and carbohydrate.  We have discussed modifying carbohydrate intake.  4.  Continue current antihypertensive regimen including lisinopril.  Blood pressure is borderline today.  Will have her continue to monitor her blood pressure readings and if her systolic pressure sustains greater than 140 or diastolic pressure sustains greater than 90, recommend calling the office for further titration of blood pressure medication.  5.  Continue current dose of statin for now pending the results of calcium score.  6.  She is otherwise up-to-date with CV testing.  7.  Follow-up with primary care for management of thyroid  8.  We will follow-up with her after calcium score to review the results.    As always, please do not hesitate to call with any questions.    Portions of the record may have been created with voice recognition software. Occasional wrong word or \"sound a like\" substitutions may have occurred due to the inherent limitations of voice recognition software. Read the chart carefully and recognize, using context, where substitutions have occurred.      Signed: Abraham Roberts DO, FACC, TERRA, FACP  "

## 2024-04-01 ENCOUNTER — TELEPHONE (OUTPATIENT)
Age: 69
End: 2024-04-01

## 2024-04-01 ENCOUNTER — PREP FOR PROCEDURE (OUTPATIENT)
Age: 69
End: 2024-04-01

## 2024-04-01 DIAGNOSIS — Z80.0 FH: COLON CANCER: Primary | ICD-10-CM

## 2024-04-01 DIAGNOSIS — K21.00 GASTROESOPHAGEAL REFLUX DISEASE WITH ESOPHAGITIS, UNSPECIFIED WHETHER HEMORRHAGE: ICD-10-CM

## 2024-04-01 NOTE — TELEPHONE ENCOUNTER
04/01/24  Screened by: Maryan Womack MA    Referring Provider : DAJA TAMAYO.    Pre- Screening:     There is no height or weight on file to calculate BMI.  Has patient been referred for a routine screening Colonoscopy? yes  Is the patient between 45-75 years old? yes      Previous Colonoscopy yes   If yes:    Date: 05/10/2019    Facility: [facility]    Reason: HXPOLYPS, FAMILY HX COLON CA          Does the patient want to see a Gastroenterologist prior to their procedure OR are they having any GI symptoms? no    Has the patient been hospitalized or had abdominal surgery in the past 6 months? no    Does the patient use supplemental oxygen? no    Does the patient take Coumadin, Lovenox, Plavix, Elliquis, Xarelto, or other blood thinning medication? no    Has the patient had a stroke, cardiac event, or stent placed in the past year? no      If patient is between 45yrs - 49yrs, please advise patient that we will have to confirm benefits & coverage with their insurance company for a routine screening colonoscopy.

## 2024-04-01 NOTE — TELEPHONE ENCOUNTER
Rescheduled date of colonoscopy (as of today):6/27/2024  Physician performing colonoscopy: DR YANG  Location of colonoscopy: [facility]  Bowel prep reviewed with patient: ROME/MICHAEL  Instructions reviewed with patient by: Maryan via telephone. Procedure directions sent via [employer].  Clearances:  n/a

## 2024-04-19 ENCOUNTER — HOSPITAL ENCOUNTER (OUTPATIENT)
Dept: ULTRASOUND IMAGING | Facility: MEDICAL CENTER | Age: 69
Discharge: HOME | End: 2024-04-19
Payer: COMMERCIAL

## 2024-04-19 DIAGNOSIS — Q61.3 POLYCYSTIC KIDNEY, UNSPECIFIED: ICD-10-CM

## 2024-04-19 DIAGNOSIS — N28.9 KIDNEY LESION, NATIVE, RIGHT: ICD-10-CM

## 2024-04-19 PROCEDURE — 76775 US EXAM ABDO BACK WALL LIM: CPT

## 2024-04-20 ENCOUNTER — HOSPITAL ENCOUNTER (OUTPATIENT)
Dept: CT IMAGING | Facility: HOSPITAL | Age: 69
Discharge: HOME | End: 2024-04-20
Attending: INTERNAL MEDICINE
Payer: COMMERCIAL

## 2024-04-20 DIAGNOSIS — E78.2 MIXED HYPERLIPIDEMIA: ICD-10-CM

## 2024-04-20 PROCEDURE — 75571 CT HRT W/O DYE W/CA TEST: CPT

## 2024-04-24 DIAGNOSIS — I10 ESSENTIAL (PRIMARY) HYPERTENSION: Primary | ICD-10-CM

## 2024-04-26 ENCOUNTER — HOSPITAL ENCOUNTER (OUTPATIENT)
Dept: MAMMOGRAPHY | Facility: MEDICAL CENTER | Age: 69
Discharge: HOME | End: 2024-04-26
Payer: COMMERCIAL

## 2024-04-26 VITALS — WEIGHT: 212 LBS | HEIGHT: 65 IN | BODY MASS INDEX: 35.32 KG/M2

## 2024-04-26 DIAGNOSIS — Z12.31 ENCOUNTER FOR SCREENING MAMMOGRAM FOR MALIGNANT NEOPLASM OF BREAST: ICD-10-CM

## 2024-04-26 PROCEDURE — 77067 SCR MAMMO BI INCL CAD: CPT

## 2024-04-26 PROCEDURE — 77063 BREAST TOMOSYNTHESIS BI: CPT

## 2024-05-17 ENCOUNTER — APPOINTMENT (OUTPATIENT)
Dept: LAB | Facility: CLINIC | Age: 69
End: 2024-05-17
Payer: COMMERCIAL

## 2024-05-17 ENCOUNTER — OFFICE VISIT (OUTPATIENT)
Dept: FAMILY MEDICINE CLINIC | Facility: CLINIC | Age: 69
End: 2024-05-17
Payer: COMMERCIAL

## 2024-05-17 VITALS
HEIGHT: 65 IN | HEART RATE: 74 BPM | TEMPERATURE: 97.8 F | OXYGEN SATURATION: 98 % | BODY MASS INDEX: 35.19 KG/M2 | WEIGHT: 211.2 LBS | DIASTOLIC BLOOD PRESSURE: 80 MMHG | SYSTOLIC BLOOD PRESSURE: 118 MMHG

## 2024-05-17 DIAGNOSIS — E83.52 HYPERCALCEMIA: ICD-10-CM

## 2024-05-17 DIAGNOSIS — Z11.59 NEED FOR HEPATITIS C SCREENING TEST: ICD-10-CM

## 2024-05-17 DIAGNOSIS — I10 ESSENTIAL (PRIMARY) HYPERTENSION: ICD-10-CM

## 2024-05-17 DIAGNOSIS — E55.9 VITAMIN D DEFICIENCY: ICD-10-CM

## 2024-05-17 DIAGNOSIS — Z87.891 HISTORY OF TOBACCO ABUSE: ICD-10-CM

## 2024-05-17 DIAGNOSIS — E78.2 MIXED HYPERLIPIDEMIA: ICD-10-CM

## 2024-05-17 DIAGNOSIS — I51.89 DIASTOLIC DYSFUNCTION: ICD-10-CM

## 2024-05-17 DIAGNOSIS — E03.9 ACQUIRED HYPOTHYROIDISM: ICD-10-CM

## 2024-05-17 DIAGNOSIS — E21.3 HYPERPARATHYROIDISM, UNSPECIFIED (HCC): ICD-10-CM

## 2024-05-17 DIAGNOSIS — Z00.00 ANNUAL PHYSICAL EXAM: Primary | ICD-10-CM

## 2024-05-17 DIAGNOSIS — N28.89 NODULE OF KIDNEY: ICD-10-CM

## 2024-05-17 LAB
ALBUMIN SERPL BCP-MCNC: 4.5 G/DL (ref 3.5–5)
ALP SERPL-CCNC: 61 U/L (ref 34–104)
ALT SERPL W P-5'-P-CCNC: 17 U/L (ref 7–52)
ANION GAP SERPL CALCULATED.3IONS-SCNC: 5 MMOL/L (ref 4–13)
AST SERPL W P-5'-P-CCNC: 16 U/L (ref 13–39)
BILIRUB SERPL-MCNC: 0.42 MG/DL (ref 0.2–1)
BUN SERPL-MCNC: 19 MG/DL (ref 5–25)
CALCIUM SERPL-MCNC: 11 MG/DL (ref 8.4–10.2)
CHLORIDE SERPL-SCNC: 106 MMOL/L (ref 96–108)
CO2 SERPL-SCNC: 28 MMOL/L (ref 21–32)
CREAT SERPL-MCNC: 0.74 MG/DL (ref 0.6–1.3)
GFR SERPL CREATININE-BSD FRML MDRD: 81 ML/MIN/1.73SQ M
GLUCOSE P FAST SERPL-MCNC: 102 MG/DL (ref 65–99)
POTASSIUM SERPL-SCNC: 4.3 MMOL/L (ref 3.5–5.3)
PROT SERPL-MCNC: 7.4 G/DL (ref 6.4–8.4)
SODIUM SERPL-SCNC: 139 MMOL/L (ref 135–147)

## 2024-05-17 PROCEDURE — 99397 PER PM REEVAL EST PAT 65+ YR: CPT | Performed by: NURSE PRACTITIONER

## 2024-05-17 PROCEDURE — 80053 COMPREHEN METABOLIC PANEL: CPT

## 2024-05-17 PROCEDURE — 36415 COLL VENOUS BLD VENIPUNCTURE: CPT

## 2024-05-17 NOTE — ASSESSMENT & PLAN NOTE
Stable: 118/80 in office  Advised periodic home pressure checks  Continue current treatment: Lisinopril 25  Follow-up 3 months

## 2024-05-17 NOTE — ASSESSMENT & PLAN NOTE
Due for lipid panel  Fasting orders placed  Continue simvastatin 10 at this time  Tolerating well without side effect

## 2024-05-17 NOTE — ASSESSMENT & PLAN NOTE
Screening lung CT still pending in system  Still encourage completing     Tobacco history: 20.5 pack per year times 40 years.  Quit 8 years ago.  Admittedly vaped for short time after quitting.  Nothing in years.       Note Abdomenal/pelvis CT 2019:  Incidentally noted multiple subcentimeter pulmonary nodules in visualized lungs measuring up to 6 mm in average axial dimension.  Based on current Fleischner Society 2017 Guidelines on incidental pulmonary nodule, followup non-contrast CT is   recommended at 6-12 months from the initial examination and, if stable at that time, an additional followup is recommended for 18-24 months from the initial examination.  Recommend obtaining baseline chest CT and further follow-up per described guideline

## 2024-05-17 NOTE — ASSESSMENT & PLAN NOTE
Noted on CT scan-dating back to 2019  Repeat CT scan 2023-recommended ultrasound  Ultrasound completed last month:  Indeterminate echogenic nodule in the mid right kidney measuring 2.1 x 1.4 x 1.5 cm. Further characterization with contrast MR or CT with renal protocol recommended to exclude a renal neoplasm.  Declines MRI, but agreeable to renal protocol CT  Order placed today

## 2024-05-17 NOTE — PROGRESS NOTES
Adult Annual Physical  Name: Mariel Hines      : 1955      MRN: [id number]  Encounter Provider: MILLY Piper  Encounter Date: 2024   Encounter department: [facility]    Assessment & Plan   1. Annual physical exam  2. Need for hepatitis C screening test  -     Hepatitis C Antibody; Future  3. Essential (primary) hypertension  Assessment & Plan:  Stable: 118/80 in office  Advised periodic home pressure checks  Continue current treatment: Lisinopril 25  Follow-up 3 months  Orders:  -     CBC and differential; Future  4. Acquired hypothyroidism  Assessment & Plan:  Recheck TSH-order written  Continue levothyroxine 50 at this time  Orders:  -     TSH, 3rd generation with Free T4 reflex; Future  5. Mixed hyperlipidemia  Assessment & Plan:  Due for lipid panel  Fasting orders placed  Continue simvastatin 10 at this time  Tolerating well without side effect  Orders:  -     Lipid panel; Future  6. Vitamin D deficiency  -     Vitamin D 25 hydroxy; Future  7. Nodule of kidney  Assessment & Plan:  Noted on CT scan-dating back to   Repeat CT scan -recommended ultrasound  Ultrasound completed last month:  Indeterminate echogenic nodule in the mid right kidney measuring 2.1 x 1.4 x 1.5 cm. Further characterization with contrast MR or CT with renal protocol recommended to exclude a renal neoplasm.  Declines MRI, but agreeable to renal protocol CT  Order placed today     Orders:  -     CT renal protocol; Future; Expected date: 2024  8. Hyperparathyroidism, unspecified (HCC)  Assessment & Plan:  Managed by endocrinology  Calcium 11  9. History of tobacco abuse  Assessment & Plan:  Screening lung CT still pending in system  Still encourage completing     Tobacco history: 20.5 pack per year times 40 years.  Quit 8 years ago.  Admittedly vaped for short time after quitting.  Nothing in years.       Note Abdomenal/pelvis CT 2019:  Incidentally noted multiple  subcentimeter pulmonary nodules in visualized lungs measuring up to 6 mm in average axial dimension.  Based on current Fleischner Society 2017 Guidelines on incidental pulmonary nodule, followup non-contrast CT is   recommended at 6-12 months from the initial examination and, if stable at that time, an additional followup is recommended for 18-24 months from the initial examination.  Recommend obtaining baseline chest CT and further follow-up per described guideline  10. Diastolic dysfunction  Assessment & Plan:  Following with cardiology  11. Hypercalcemia  Assessment & Plan:  Monitoring/managed by endocrinology    Latest Calcium 11    Immunizations and preventive care screenings were discussed with patient today. Appropriate education was printed on patient's after visit summary.    Endo scheduled 6/7  Colonoscopy scheduled: 6/27  Cardiology scheduled 7/12  ENT scheduled: 11/15      Counseling:  Alcohol/drug use: discussed moderation in alcohol intake, the recommendations for healthy alcohol use, and avoidance of illicit drug use.  Dental Health: discussed importance of regular tooth brushing, flossing, and dental visits.  Injury prevention: discussed safety/seat belts, safety helmets, smoke detectors, carbon dioxide detectors, and smoking near bedding or upholstery.  Sexual health: discussed sexually transmitted diseases, partner selection, use of condoms, avoidance of unintended pregnancy, and contraceptive alternatives.  Exercise: the importance of regular exercise/physical activity was discussed. Recommend exercise 3-5 times per week for at least 30 minutes.       Depression Screening and Follow-up Plan: Patient was screened for depression during today's encounter. They screened negative with a PHQ-2 score of 0.    Falls Plan of Care: No recent falls  No fall risks identified    Urinary Incontinence Plan of Care: counseling topics discussed: practice Kegel (pelvic floor strengthening) exercises, preventing  constipation and wearing XAVIER stockings for edema control. Denies.     Lung Cancer Screening Shared Decision Making: I discussed with her that she is a candidate for lung cancer CT screening.     The following Shared Decision-Making points were covered:  Benefits of screening were discussed, including the rates of reduction in death from lung cancer and other causes.  Harms of screening were reviewed, including false positive tests, radiation exposure levels, risks of invasive procedures, risks of complications of screening, and risk of overdiagnosis.  I counseled on the importance of adherence to annual lung cancer LDCT screening, impact of co-morbidities, and ability or willingness to undergo diagnosis and treatment.  I counseled on the importance of maintaining abstinence as a former smoker or was counseled on the importance of smoking cessation if a current smoker    Review of Eligibility Criteria: She meets all of the criteria for Lung Cancer Screening.   - She is 69 y.o.   - She has 20 pack year tobacco history and is a current smoker or has quit within the past 15 years  - She presents no signs or symptoms of lung cancer    After discussion, the patient decided to elect lung cancer screening.        History of Present Illness     Adult Annual Physical:  Patient presents for annual physical.     Diet and Physical Activity:  - Diet/Nutrition: well balanced diet.  - Exercise: walking.    Depression Screening:  - PHQ-2 Score: 0    General Health:  - Sleep: sleeps well.  - Hearing: normal hearing bilateral ears.  - Vision: no vision problems, goes for regular eye exams and wears glasses.  - Dental: regular dental visits. Every 3 month    /GYN Health:  - Follows with GYN: no.   - Menopause: postmenopausal.   - History of STDs: no    Advanced Care Planning:  - Has an advanced directive?: no    - Has a durable medical POA?: no    - ACP document given to patient?: no      Review of Systems   Constitutional: Negative.  "   HENT: Negative.     Eyes: Negative.    Respiratory: Negative.     Cardiovascular: Negative.    Gastrointestinal: Negative.    Genitourinary: Negative.    Musculoskeletal: Negative.    Skin: Negative.    Neurological: Negative.    Psychiatric/Behavioral: Negative.       Pertinent Medical History   As above      Medical History Reviewed by provider this encounter:       Current Outpatient Medications on File Prior to Visit   Medication Sig Dispense Refill    ASPIRIN 81 PO Take by mouth daily      Latanoprostene Bunod (Vyzulta) 0.024 % SOLN Administer 1 drop to both eyes in the morning      levothyroxine 50 mcg tablet Take 1 tablet (50 mcg total) by mouth in the morning 90 tablet 3    lisinopril (ZESTRIL) 20 mg tablet Take 1 tablet (20 mg total) by mouth daily 90 tablet 3    lisinopril (ZESTRIL) 5 mg tablet Take 1 tablet (5 mg total) by mouth daily 90 tablet 3    montelukast (SINGULAIR) 10 mg tablet Take 1 tablet (10 mg total) by mouth daily at bedtime 90 tablet 3    simvastatin (ZOCOR) 10 mg tablet Take 1 tablet (10 mg total) by mouth daily at bedtime 90 tablet 3    ALPRAZolam (XANAX) 0.25 mg tablet Take 1 tablet by mouth as needed (Patient not taking: Reported on 10/6/2023)      Cholecalciferol (Vitamin D) 50 MCG (2000 UT) CAPS 2,000 Units daily (Patient not taking: Reported on 3/8/2024)      famotidine (PEPCID) 20 mg tablet Start taking 20mg daily. If still having symptoms after 2 weeks, increase dose to 20mg bid. (Patient not taking: Reported on 5/17/2024) 90 tablet 3    fluorouracil (EFUDEX) 5 % cream Apply 1 application topically daily as needed (Patient not taking: Reported on 3/8/2024)      ketoconazole (NIZORAL) 2 % cream Apply topically daily Athletes foot       No current facility-administered medications on file prior to visit.        Objective     /80 (BP Location: Left arm, Patient Position: Sitting, Cuff Size: Large)   Pulse 74   Temp 97.8 °F (36.6 °C) (Temporal)   Ht 5' 5\" (1.651 m)   Wt " 95.8 kg (211 lb 3.2 oz)   SpO2 98%   BMI 35.15 kg/m²     Physical Exam  Vitals and nursing note reviewed.   Constitutional:       General: She is not in acute distress.     Appearance: Normal appearance. She is well-developed and well-groomed. She is not ill-appearing.   HENT:      Head: Normocephalic.      Right Ear: Tympanic membrane, ear canal and external ear normal.      Left Ear: Tympanic membrane, ear canal and external ear normal.      Nose: Nose normal.      Mouth/Throat:      Mouth: Mucous membranes are moist.      Pharynx: Oropharynx is clear.   Eyes:      Conjunctiva/sclera: Conjunctivae normal.      Pupils: Pupils are equal, round, and reactive to light.   Neck:      Thyroid: No thyromegaly.      Vascular: No carotid bruit.   Cardiovascular:      Rate and Rhythm: Normal rate and regular rhythm.      Pulses:           Posterior tibial pulses are 2+ on the right side and 2+ on the left side.      Heart sounds: Normal heart sounds.   Pulmonary:      Effort: Pulmonary effort is normal. No respiratory distress.      Breath sounds: Normal breath sounds and air entry.   Musculoskeletal:      Right lower leg: No edema.      Left lower leg: No edema.   Lymphadenopathy:      Cervical: No cervical adenopathy.   Skin:     General: Skin is warm and dry.   Neurological:      General: No focal deficit present.      Mental Status: She is alert and oriented to person, place, and time.   Psychiatric:         Attention and Perception: Attention normal.         Mood and Affect: Mood normal.         Behavior: Behavior normal.         Thought Content: Thought content normal.         Judgment: Judgment normal.       Administrative Statements

## 2024-06-06 ENCOUNTER — TELEPHONE (OUTPATIENT)
Age: 69
End: 2024-06-06

## 2024-06-06 NOTE — TELEPHONE ENCOUNTER
06/06/24  Screened by: Susie Rodríguez    Referring Provider     Pre- Screening:     There is no height or weight on file to calculate BMI. 35.15  Has patient been referred for a routine screening Colonoscopy? yes  Is the patient between 45-75 years old? yes      Previous Colonoscopy yes   If yes:    Date: 5/10/19    Facility: [facility]    Reason:     Does the patient want to see a Gastroenterologist prior to their procedure OR are they having any GI symptoms? no    Has the patient been hospitalized or had abdominal surgery in the past 6 months? no    Does the patient use supplemental oxygen? no    Does the patient take Coumadin, Lovenox, Plavix, Elliquis, Xarelto, or other blood thinning medication? no    Has the patient had a stroke, cardiac event, or stent placed in the past year? no      If patient is between 45yrs - 49yrs, please advise patient that we will have to confirm benefits & coverage with their insurance company for a routine screening colonoscopy.

## 2024-06-06 NOTE — TELEPHONE ENCOUNTER
Patient calling to reschedule her colonoscopy and EGD. Procedures have been rescheduled for 10/30/24. Patient has prep instructions. ASC assessment was updated but will need to be updated again closer to scheduled date. OA screening updated.

## 2024-06-07 ENCOUNTER — TELEPHONE (OUTPATIENT)
Dept: HEMATOLOGY ONCOLOGY | Facility: CLINIC | Age: 69
End: 2024-06-07

## 2024-06-07 ENCOUNTER — OFFICE VISIT (OUTPATIENT)
Dept: ENDOCRINOLOGY | Facility: CLINIC | Age: 69
End: 2024-06-07
Payer: COMMERCIAL

## 2024-06-07 VITALS
HEART RATE: 62 BPM | WEIGHT: 211 LBS | DIASTOLIC BLOOD PRESSURE: 70 MMHG | HEIGHT: 65 IN | OXYGEN SATURATION: 98 % | BODY MASS INDEX: 35.16 KG/M2 | SYSTOLIC BLOOD PRESSURE: 104 MMHG

## 2024-06-07 DIAGNOSIS — E21.3 HYPERPARATHYROIDISM, UNSPECIFIED (HCC): Primary | ICD-10-CM

## 2024-06-07 DIAGNOSIS — E83.52 HYPERCALCEMIA: ICD-10-CM

## 2024-06-07 PROCEDURE — 99214 OFFICE O/P EST MOD 30 MIN: CPT | Performed by: INTERNAL MEDICINE

## 2024-06-07 NOTE — TELEPHONE ENCOUNTER
I called Mariel in response to a referral that was received for patient to establish care with Surgical Oncology.     Outreach was made to schedule a consultation.    Patient declined scheduling. The referral has been closed.

## 2024-06-07 NOTE — ASSESSMENT & PLAN NOTE
Repeat calcium, albumin and intact PTH.  I suspect she has primary hyperparathyroidism.  I have ordered a sestamibi scan.  I also referred her to Dr. Naranjo for an opinion regarding surgery.

## 2024-06-07 NOTE — PROGRESS NOTES
Ambulatory Visit  Name: Mariel Hines      : 1955      MRN: [id number]  Encounter Provider: Ayden Puente MD  Encounter Date: 2024   Encounter department: [facility] [place]    Assessment & Plan   1. Hyperparathyroidism, unspecified (HCC)  Assessment & Plan:  Repeat calcium, albumin and intact PTH.  I suspect she has primary hyperparathyroidism.  I have ordered a sestamibi scan.  I also referred her to Dr. Naranjo for an opinion regarding surgery.  Orders:  -     Calcium; Future  -     Albumin; Future  -     PTH, intact; Future  -     NM parathyroid scan w spect; Future; Expected date: 2024  -     Ambulatory referral to Surgical Oncology; Future  2. Hypercalcemia  Assessment & Plan:  This is due to primary hyperparathyroidism.      History of Present Illness     Mariel Hines is a 69 y.o. female who presents for follow-up of primary hyperparathyroidism.  Her most recent calcium has increased to 11.  Overall, she states that she feels well.    Review of Systems   Constitutional:  Negative for chills and fever.   Respiratory:  Negative for shortness of breath.    Cardiovascular:  Negative for chest pain.   Gastrointestinal:  Negative for constipation, diarrhea, nausea and vomiting.   Endocrine: Negative for polydipsia and polyuria.   All other systems reviewed and are negative.    Current Outpatient Medications on File Prior to Visit   Medication Sig Dispense Refill    ASPIRIN 81 PO Take by mouth daily      Cholecalciferol (Vitamin D) 50 MCG ( UT) CAPS 2,000 Units daily      famotidine (PEPCID) 20 mg tablet Start taking 20mg daily. If still having symptoms after 2 weeks, increase dose to 20mg bid. 90 tablet 3    fluorouracil (EFUDEX) 5 % cream Apply 1 application. topically daily as needed      Latanoprostene Bunod (Vyzulta) 0.024 % SOLN Administer 1 drop to both eyes in the morning      levothyroxine 50 mcg tablet Take 1 tablet (50 mcg  "total) by mouth in the morning 90 tablet 3    lisinopril (ZESTRIL) 20 mg tablet Take 1 tablet (20 mg total) by mouth daily 90 tablet 3    lisinopril (ZESTRIL) 5 mg tablet Take 1 tablet (5 mg total) by mouth daily 90 tablet 3    montelukast (SINGULAIR) 10 mg tablet Take 1 tablet (10 mg total) by mouth daily at bedtime (Patient taking differently: Take 10 mg by mouth daily at bedtime Take half a tablet daily) 90 tablet 3    simvastatin (ZOCOR) 10 mg tablet Take 1 tablet (10 mg total) by mouth daily at bedtime 90 tablet 3    ALPRAZolam (XANAX) 0.25 mg tablet Take 1 tablet by mouth as needed (Patient not taking: Reported on 10/6/2023)      ketoconazole (NIZORAL) 2 % cream Apply topically daily Athletes foot (Patient not taking: Reported on 6/7/2024)       No current facility-administered medications on file prior to visit.      Objective     /70   Pulse 62   Ht 5' 5\" (1.651 m)   Wt 95.7 kg (211 lb)   SpO2 98%   BMI 35.11 kg/m²     Physical Exam  Vitals and nursing note reviewed.   Constitutional:       Appearance: Normal appearance.   HENT:      Head: Normocephalic and atraumatic.   Eyes:      General: No scleral icterus.        Right eye: No discharge.         Left eye: No discharge.   Pulmonary:      Effort: Pulmonary effort is normal.   Musculoskeletal:         General: Normal range of motion.      Cervical back: Normal range of motion.   Skin:     Coloration: Skin is not jaundiced.   Neurological:      General: No focal deficit present.      Mental Status: She is alert and oriented to person, place, and time.   Psychiatric:         Mood and Affect: Mood normal.         Behavior: Behavior normal.       Administrative Statements           "

## 2024-06-12 DIAGNOSIS — I25.10 CORONARY ARTERY DISEASE INVOLVING NATIVE CORONARY ARTERY OF NATIVE HEART WITHOUT ANGINA PECTORIS: ICD-10-CM

## 2024-06-12 DIAGNOSIS — N28.89 NODULE OF KIDNEY: Primary | ICD-10-CM

## 2024-06-12 DIAGNOSIS — E78.2 MIXED HYPERLIPIDEMIA: Primary | ICD-10-CM

## 2024-06-12 RX ORDER — ROSUVASTATIN CALCIUM 40 MG/1
40 TABLET, COATED ORAL DAILY
Qty: 90 TABLET | Refills: 3 | Status: SHIPPED | OUTPATIENT
Start: 2024-06-12 | End: 2024-09-10

## 2024-06-12 NOTE — PROGRESS NOTES
Spoke with patient regarding the results of her CT coronary calcium score.  Calcium score was elevated at 661.  Given this finding, would recommend evaluation of coronary arteries for evidence of underlying myocardial ischemia.  Would perform pharmacologic nuclear stress test to assess for any evidence of underlying myocardial ischemia.  Would perform this is a pharmacologic test due to the patient's knee pain and inability to ambulate out of treadmill.  Should she develop any acute onset chest discomfort or shortness of breath, recommend seeking immediate medical attention/dial 911.  We will follow-up with her after testing in the office to review the results in July.

## 2024-06-13 ENCOUNTER — TELEPHONE (OUTPATIENT)
Dept: CARDIOLOGY CLINIC | Facility: CLINIC | Age: 69
End: 2024-06-13

## 2024-07-03 ENCOUNTER — APPOINTMENT (OUTPATIENT)
Dept: LAB | Facility: HOSPITAL | Age: 69
End: 2024-07-03
Payer: COMMERCIAL

## 2024-07-03 ENCOUNTER — HOSPITAL ENCOUNTER (OUTPATIENT)
Dept: NON INVASIVE DIAGNOSTICS | Facility: HOSPITAL | Age: 69
Discharge: HOME | End: 2024-07-03
Attending: INTERNAL MEDICINE
Payer: COMMERCIAL

## 2024-07-03 ENCOUNTER — HOSPITAL ENCOUNTER (OUTPATIENT)
Dept: NUCLEAR MEDICINE | Facility: HOSPITAL | Age: 69
Discharge: HOME | End: 2024-07-03
Attending: INTERNAL MEDICINE
Payer: COMMERCIAL

## 2024-07-03 VITALS — HEIGHT: 69 IN | WEIGHT: 208 LBS | BODY MASS INDEX: 30.81 KG/M2

## 2024-07-03 DIAGNOSIS — E55.9 VITAMIN D DEFICIENCY: ICD-10-CM

## 2024-07-03 DIAGNOSIS — I10 ESSENTIAL (PRIMARY) HYPERTENSION: ICD-10-CM

## 2024-07-03 DIAGNOSIS — I25.10 CORONARY ARTERY DISEASE INVOLVING NATIVE CORONARY ARTERY OF NATIVE HEART WITHOUT ANGINA PECTORIS: ICD-10-CM

## 2024-07-03 DIAGNOSIS — E03.9 ACQUIRED HYPOTHYROIDISM: ICD-10-CM

## 2024-07-03 DIAGNOSIS — E21.3 HYPERPARATHYROIDISM, UNSPECIFIED (HCC): ICD-10-CM

## 2024-07-03 DIAGNOSIS — Z11.59 NEED FOR HEPATITIS C SCREENING TEST: ICD-10-CM

## 2024-07-03 DIAGNOSIS — E78.2 MIXED HYPERLIPIDEMIA: ICD-10-CM

## 2024-07-03 LAB
25(OH)D3 SERPL-MCNC: 34.2 NG/ML (ref 30–100)
ALBUMIN SERPL BCG-MCNC: 4.7 G/DL (ref 3.5–5)
BASOPHILS # BLD AUTO: 0.05 THOUSANDS/ÂΜL (ref 0–0.1)
BASOPHILS NFR BLD AUTO: 1 % (ref 0–1)
CALCIUM SERPL-MCNC: 11.1 MG/DL (ref 8.4–10.2)
CHEST PAIN STATEMENT: NORMAL
CHOLEST SERPL-MCNC: 112 MG/DL
EOSINOPHIL # BLD AUTO: 0.23 THOUSAND/ÂΜL (ref 0–0.61)
EOSINOPHIL NFR BLD AUTO: 3 % (ref 0–6)
ERYTHROCYTE [DISTWIDTH] IN BLOOD BY AUTOMATED COUNT: 12.2 % (ref 11.6–15.1)
HCT VFR BLD AUTO: 43 % (ref 34.8–46.1)
HCV AB SER QL: NORMAL
HDLC SERPL-MCNC: 52 MG/DL
HGB BLD-MCNC: 13.9 G/DL (ref 11.5–15.4)
IMM GRANULOCYTES # BLD AUTO: 0.01 THOUSAND/UL (ref 0–0.2)
IMM GRANULOCYTES NFR BLD AUTO: 0 % (ref 0–2)
LDLC SERPL CALC-MCNC: 28 MG/DL (ref 0–100)
LYMPHOCYTES # BLD AUTO: 2.12 THOUSANDS/ÂΜL (ref 0.6–4.47)
LYMPHOCYTES NFR BLD AUTO: 31 % (ref 14–44)
MAX DIASTOLIC BP: 78 MMHG
MAX HR PERCENT: 64 %
MAX HR: 96 BPM
MAX PREDICTED HEART RATE: 149 BPM
MCH RBC QN AUTO: 29.8 PG (ref 26.8–34.3)
MCHC RBC AUTO-ENTMCNC: 32.3 G/DL (ref 31.4–37.4)
MCV RBC AUTO: 92 FL (ref 82–98)
MONOCYTES # BLD AUTO: 0.68 THOUSAND/ÂΜL (ref 0.17–1.22)
MONOCYTES NFR BLD AUTO: 10 % (ref 4–12)
NEUTROPHILS # BLD AUTO: 3.7 THOUSANDS/ÂΜL (ref 1.85–7.62)
NEUTS SEG NFR BLD AUTO: 55 % (ref 43–75)
NONHDLC SERPL-MCNC: 60 MG/DL
NRBC BLD AUTO-RTO: 0 /100 WBCS
NUC STRESS EJECTION FRACTION: 68 %
PLATELET # BLD AUTO: 245 THOUSANDS/UL (ref 149–390)
PMV BLD AUTO: 10.6 FL (ref 8.9–12.7)
PROTOCOL NAME: NORMAL
PTH-INTACT SERPL-MCNC: 67.1 PG/ML (ref 12–88)
RATE PRESSURE PRODUCT: NORMAL
RBC # BLD AUTO: 4.66 MILLION/UL (ref 3.81–5.12)
REASON FOR TERMINATION: NORMAL
SL CV REST NUCLEAR ISOTOPE DOSE: 10.3 MCI
SL CV STRESS NUCLEAR ISOTOPE DOSE: 29.6 MCI
SL CV STRESS RECOVERY BP: NORMAL MMHG
SL CV STRESS RECOVERY HR: 75 BPM
SL CV STRESS RECOVERY O2 SAT: 100 %
STRESS ANGINA INDEX: 0
STRESS BASELINE BP: NORMAL MMHG
STRESS BASELINE HR: 71 BPM
STRESS O2 SAT REST: 100 %
STRESS PEAK HR: 96 BPM
STRESS POST ESTIMATED WORKLOAD: 1 METS
STRESS POST EXERCISE DUR MIN: 3 MIN
STRESS POST EXERCISE DUR MIN: 3 MIN
STRESS POST EXERCISE DUR SEC: 0 SEC
STRESS POST O2 SAT PEAK: 100 %
STRESS POST PEAK BP: 157 MMHG
STRESS POST PEAK HR: 96 BPM
STRESS POST PEAK SYSTOLIC BP: 162 MMHG
STRESS/REST PERFUSION RATIO: 1.13
TARGET HR FORMULA: NORMAL
TEST INDICATION: NORMAL
TRIGL SERPL-MCNC: 159 MG/DL
TSH SERPL DL<=0.05 MIU/L-ACNC: 3.37 UIU/ML (ref 0.45–4.5)
WBC # BLD AUTO: 6.79 THOUSAND/UL (ref 4.31–10.16)

## 2024-07-03 PROCEDURE — 82040 ASSAY OF SERUM ALBUMIN: CPT

## 2024-07-03 PROCEDURE — 85025 COMPLETE CBC W/AUTO DIFF WBC: CPT

## 2024-07-03 PROCEDURE — 83970 ASSAY OF PARATHORMONE: CPT

## 2024-07-03 PROCEDURE — A9502 TC99M TETROFOSMIN: HCPCS

## 2024-07-03 PROCEDURE — 82306 VITAMIN D 25 HYDROXY: CPT

## 2024-07-03 PROCEDURE — 84443 ASSAY THYROID STIM HORMONE: CPT

## 2024-07-03 PROCEDURE — 93016 CV STRESS TEST SUPVJ ONLY: CPT

## 2024-07-03 PROCEDURE — 78452 HT MUSCLE IMAGE SPECT MULT: CPT

## 2024-07-03 PROCEDURE — 93017 CV STRESS TEST TRACING ONLY: CPT

## 2024-07-03 PROCEDURE — 80061 LIPID PANEL: CPT

## 2024-07-03 PROCEDURE — 93018 CV STRESS TEST I&R ONLY: CPT

## 2024-07-03 PROCEDURE — 36415 COLL VENOUS BLD VENIPUNCTURE: CPT

## 2024-07-03 PROCEDURE — 86803 HEPATITIS C AB TEST: CPT

## 2024-07-03 RX ORDER — REGADENOSON 0.08 MG/ML
0.4 INJECTION, SOLUTION INTRAVENOUS ONCE
Status: COMPLETED | OUTPATIENT
Start: 2024-07-03 | End: 2024-07-03

## 2024-07-03 RX ADMIN — REGADENOSON 0.4 MG: 0.08 INJECTION, SOLUTION INTRAVENOUS at 09:59

## 2024-07-08 ENCOUNTER — HOSPITAL ENCOUNTER (OUTPATIENT)
Dept: NUCLEAR MEDICINE | Facility: HOSPITAL | Age: 69
Discharge: HOME | End: 2024-07-08
Attending: INTERNAL MEDICINE
Payer: COMMERCIAL

## 2024-07-08 DIAGNOSIS — E21.3 HYPERPARATHYROIDISM, UNSPECIFIED (HCC): ICD-10-CM

## 2024-07-08 PROCEDURE — A9500 TC99M SESTAMIBI: HCPCS

## 2024-07-08 PROCEDURE — 78071 PARATHYRD PLANAR W/WO SUBTRJ: CPT

## 2024-07-12 ENCOUNTER — OFFICE VISIT (OUTPATIENT)
Dept: CARDIOLOGY CLINIC | Facility: CLINIC | Age: 69
End: 2024-07-12
Payer: COMMERCIAL

## 2024-07-12 VITALS
HEART RATE: 64 BPM | DIASTOLIC BLOOD PRESSURE: 80 MMHG | SYSTOLIC BLOOD PRESSURE: 130 MMHG | WEIGHT: 206.6 LBS | BODY MASS INDEX: 30.51 KG/M2

## 2024-07-12 DIAGNOSIS — I51.89 DIASTOLIC DYSFUNCTION: Primary | ICD-10-CM

## 2024-07-12 DIAGNOSIS — I10 ESSENTIAL (PRIMARY) HYPERTENSION: ICD-10-CM

## 2024-07-12 DIAGNOSIS — I25.10 CORONARY ARTERY DISEASE INVOLVING NATIVE CORONARY ARTERY OF NATIVE HEART WITHOUT ANGINA PECTORIS: ICD-10-CM

## 2024-07-12 DIAGNOSIS — E66.9 OBESITY, CLASS I, BMI 30-34.9: ICD-10-CM

## 2024-07-12 DIAGNOSIS — E78.2 MIXED HYPERLIPIDEMIA: ICD-10-CM

## 2024-07-12 PROCEDURE — 99214 OFFICE O/P EST MOD 30 MIN: CPT | Performed by: INTERNAL MEDICINE

## 2024-07-12 RX ORDER — ROSUVASTATIN CALCIUM 20 MG/1
20 TABLET, COATED ORAL DAILY
Qty: 90 TABLET | Refills: 3 | Status: SHIPPED | OUTPATIENT
Start: 2024-07-12 | End: 2024-10-10

## 2024-07-12 NOTE — PROGRESS NOTES
"  Cardiology Office Note  MD Daniel Marsh MD Jason Kaplan, DO, [facility]  MD Benito Edmond DO, [facility]  Elias Stewart DO, [facility]  ----------------------------------------------------------------  [address] [address]    Mariel Hines 69 y.o. female MRN: [id number]  Unit/Bed#:  Encounter: [id number]      History of Present Illness:  It was a pleasure to see Mariel Hines in the office today for follow-up CV evaluation. She has a past medical history of hypertension, hypothyroid, dyslipidemia, obesity and tobacco use.  She established care with us in April 2023.  She was seen in the office due to findings of diastolic dysfunction on echocardiogram that was performed in February 2023.  The echocardiogram was performed due to a history of \"murmur\" that was heard in the past.  The echocardiogram demonstrated normal left ventricular systolic function without significant valvular disease aside from the diastolic dysfunction.  Overall, the patient did admit to a longstanding history of hypertension, but her blood pressures were well controlled on a combination of lisinopril and hydrochlorothiazide.  Due to hypercalcemia, her hydrochlorothiazide was discontinued and she was maintained on lisinopril alone for her blood pressure control.  Due to the patient's risk factors, she was sent for CT coronary calcium score in April 2024.  Calcium score was found to be significantly elevated for which she was sent for stress test in July 2024.  She is here today to review the results.  Overall, she continues to feel in her usual state of health.  Denies any significant medical changes.  She denies any chest pain, pressure, tightness or squeezing.  Denies lightheadedness, dizziness or palpitations.  Denies lower extremity swelling orthopnea or paroxysmal nocturnal dyspnea.  Admits to occasional heartburn, but improved from prior.    Review of " Systems:  Review of Systems   Constitutional: Negative for decreased appetite, fever, weight gain and weight loss.   HENT:  Negative for congestion and sore throat.    Eyes:  Negative for visual disturbance.   Cardiovascular:  Negative for chest pain, dyspnea on exertion, leg swelling, near-syncope and palpitations.   Respiratory:  Negative for cough and shortness of breath.    Hematologic/Lymphatic: Negative for bleeding problem.   Skin:  Negative for rash.   Musculoskeletal:  Negative for myalgias and neck pain.   Gastrointestinal:  Negative for abdominal pain and nausea.   Neurological:  Negative for light-headedness and weakness.   Psychiatric/Behavioral:  Negative for depression.        Past Medical History:   Diagnosis Date    Allergic rhinitis     Disease of thyroid gland     GERD (gastroesophageal reflux disease)     Glaucoma     Hypercholesteremia     Hypertension     Hypertension        Past Surgical History:   Procedure Laterality Date    HYSTERECTOMY      OOPHORECTOMY      TONSILLECTOMY  childhood       Social History     Socioeconomic History    Marital status: Single     Spouse name: Not on file    Number of children: Not on file    Years of education: Not on file    Highest education level: Not on file   Occupational History    Not on file   Tobacco Use    Smoking status: Former     Current packs/day: 0.00     Average packs/day: 0.5 packs/day for 41.0 years (20.5 ttl pk-yrs)     Types: Cigarettes     Start date: 1973     Quit date: 1/1/2014     Years since quitting: 10.5    Smokeless tobacco: Never   Vaping Use    Vaping status: Never Used   Substance and Sexual Activity    Alcohol use: Not Currently     Comment: social     Drug use: Never    Sexual activity: Not Currently     Partners: Male   Other Topics Concern    Not on file   Social History Narrative    Not on file     Social Determinants of Health     Financial Resource Strain: Not on file   Food Insecurity: Not on file   Transportation Needs:  Not on file   Physical Activity: Not on file   Stress: Not on file   Social Connections: Not on file   Intimate Partner Violence: Not on file   Housing Stability: Not on file       Family History   Problem Relation Age of Onset    Cancer Father     Colon cancer Father     Skin cancer Father     Diabetes Mother     Heart attack Mother     Heart disease Mother     Hypertension Mother     Diabetes type II Mother     Hypothyroidism Mother     Hypertension Sister     Hypothyroidism Sister     Prostate cancer Brother        Allergies   Allergen Reactions    Doxycycline GI Intolerance    Erythromycin GI Intolerance     Sensitivity to medication    sensitivity  Sensitivity to medication  sensitivity      Norvasc [Amlodipine] Swelling         Current Outpatient Medications:     ASPIRIN 81 PO, Take by mouth daily, Disp: , Rfl:     Cholecalciferol (Vitamin D) 50 MCG (2000 UT) CAPS, 2,000 Units daily, Disp: , Rfl:     fluorouracil (EFUDEX) 5 % cream, Apply 1 application. topically daily as needed, Disp: , Rfl:     ketoconazole (NIZORAL) 2 % cream, Apply topically daily Athletes foot, Disp: , Rfl:     Latanoprostene Bunod (Vyzulta) 0.024 % SOLN, Administer 1 drop to both eyes in the morning, Disp: , Rfl:     levothyroxine 50 mcg tablet, Take 1 tablet (50 mcg total) by mouth in the morning, Disp: 90 tablet, Rfl: 3    lisinopril (ZESTRIL) 20 mg tablet, Take 1 tablet (20 mg total) by mouth daily, Disp: 90 tablet, Rfl: 3    lisinopril (ZESTRIL) 5 mg tablet, Take 1 tablet (5 mg total) by mouth daily, Disp: 90 tablet, Rfl: 3    montelukast (SINGULAIR) 10 mg tablet, Take 1 tablet (10 mg total) by mouth daily at bedtime (Patient taking differently: Take 10 mg by mouth daily at bedtime Take half a tablet daily), Disp: 90 tablet, Rfl: 3    rosuvastatin (CRESTOR) 20 MG tablet, Take 1 tablet (20 mg total) by mouth daily, Disp: 90 tablet, Rfl: 3    ALPRAZolam (XANAX) 0.25 mg tablet, Take 1 tablet by mouth as needed (Patient not taking:  Reported on 10/6/2023), Disp: , Rfl:     famotidine (PEPCID) 20 mg tablet, Start taking 20mg daily. If still having symptoms after 2 weeks, increase dose to 20mg bid. (Patient not taking: Reported on 7/12/2024), Disp: 90 tablet, Rfl: 3    Vitals:    07/12/24 0934   BP: 130/80   BP Location: Left arm   Patient Position: Sitting   Cuff Size: Large   Pulse: 64   Weight: 93.7 kg (206 lb 9.6 oz)         Body mass index is 30.51 kg/m².    PHYSICAL EXAMINATION:  Gen: Awake, Alert, NAD   Head/eyes: AT/NC, pupils equal and round, Anicteric  ENT: mmm  Neck: Supple, No elevated JVP, trachea midline  Resp: CTA bilaterally no w/r/r  CV: RRR +S1, S2, No m/r/g  Abd: Soft, obese, NT/ND + BS  Ext: no LE edema bilaterally  Neuro: Follows commands, moves all extermities  Psych: Appropriate affect, pleasant mood, pleasant attitude, non-combative  Skin: warm; no rash, erythema or venous stasis changes on exposed skin    --------------------------------------------------------------------------------  TREADMILL STRESS  No results found for this or any previous visit.     --------------------------------------------------------------------------------  NUCLEAR STRESS TEST: No results found for this or any previous visit.    No results found for this or any previous visit.      --------------------------------------------------------------------------------  CATH:  No results found for this or any previous visit.    --------------------------------------------------------------------------------  ECHO:   No results found for this or any previous visit.    No results found for this or any previous visit.    --------------------------------------------------------------------------------  HOLTER  No results found for this or any previous visit.    No results found for this or any previous visit.    --------------------------------------------------------------------------------  CAROTIDS  No results found for this or any previous visit.    "  --------------------------------------------------------------------------------  ECGs:  No results found for this visit on 07/12/24.         Lab Results   Component Value Date    WBC 6.79 07/03/2024    HGB 13.9 07/03/2024    HCT 43.0 07/03/2024    MCV 92 07/03/2024     07/03/2024      Lab Results   Component Value Date    SODIUM 139 05/17/2024    K 4.3 05/17/2024     05/17/2024    CO2 28 05/17/2024    BUN 19 05/17/2024    CREATININE 0.74 05/17/2024    GLUC 85 10/03/2023    CALCIUM 11.1 (H) 07/03/2024      No results found for: \"HGBA1C\"   No results found for: \"CHOL\"  Lab Results   Component Value Date    HDL 52 07/03/2024    HDL 49 (L) 04/07/2023    HDL 45 (L) 11/03/2022     Lab Results   Component Value Date    LDLCALC 28 07/03/2024    LDLCALC 85 04/07/2023    LDLCALC 163 (H) 11/03/2022     Lab Results   Component Value Date    TRIG 159 (H) 07/03/2024    TRIG 216 (H) 04/07/2023    TRIG 340 (H) 11/03/2022     No results found for: \"CHOLHDL\"   No results found for: \"INR\", \"PROTIME\"     1. Diastolic dysfunction  2. Essential (primary) hypertension  3. Mixed hyperlipidemia  -     rosuvastatin (CRESTOR) 20 MG tablet; Take 1 tablet (20 mg total) by mouth daily  -     Lipid Panel with Direct LDL reflex; Future; Expected date: 10/30/2024  4. Coronary artery disease involving native coronary artery of native heart without angina pectoris  5. Obesity, Class I, BMI 30-34.9          IMPRESSION:  CAD  CT coronary calcium score of 661, April 2024  Abnormal pharmacologic nuclear stress test demonstrates LAD territory defect concerning for ischemia, gated EF 68%, July 2024  Diastolic dysfunction  Hypertension  LVEF 65%, mild LVH, grade 1 diastolic dysfunction, AV sclerosis, MV sclerosis, trace MR/TR/LA, February 2023  Dyslipidemia  Obesity  Hypothyroidism  History of tobacco use - quit 2003  History of hypercalcemia  Family history of CAD    PLAN:  It was a pleasure to see Mac in the office today for follow-up " "CV evaluation.  Patient underwent CT coronary calcium score which demonstrated a score of 661.  Due to this finding, she was sent for stress test in July 2024.  The stress test demonstrated LAD territory abnormality concerning for ischemia.  She remains asymptomatic.  She has no chest pain concerning for angina and no signs or symptoms of heart failure.  Clinically she examines to be euvolemic.  Blood pressure and heart rate are currently stable.  She is tolerating her current medications without any reported adverse effects.  She can perform greater than 4 METS on a daily basis without significant exertional symptoms.  Based on her clinical presentation, I have the following recommendations:    1.  Given the patient's lack of symptoms, good overall functional capacity, but abnormal stress test, I have recommended cardiac CTA to assess the patient's coronary anatomy.  Risks and benefits discussed.  For now, the patient would like to hold off.  Will discuss further on follow-up encounter.  2.  Lifelong aspirin.  3.  Continue high intensity statin.  Goal LDL is less than 55 mg/dL.  Patient is currently to goal.  Repeat lipid panel in 4 months prior to her follow-up encounter.  4.  Continue current antihypertensive regimen including lisinopril.  5.  Recommend heart healthy diet low in sodium and carbohydrate.  6.  Would encourage physical activity as tolerated to build cardiovascular endurance.  7.  Hypothyroid management as per primary care  8.  We will follow-up with her in 4 months to reassess her progress and review the results of lipid panel.    As always, please do not hesitate to call with any questions.    Portions of the record may have been created with voice recognition software. Occasional wrong word or \"sound a like\" substitutions may have occurred due to the inherent limitations of voice recognition software. Read the chart carefully and recognize, using context, where substitutions have " occurred.      Signed: Abraham Roberts DO, FACC, TERRA, FACP

## 2024-07-15 NOTE — RESULT ENCOUNTER NOTE
There is no evidence of abnormal parathyroid gland on this study.  Would recommend doing parathyroid CT scan.  Were you able to schedule an appointment with Dr. Naranjo to discuss surgery.

## 2024-08-20 ENCOUNTER — TELEPHONE (OUTPATIENT)
Age: 69
End: 2024-08-20

## 2024-08-20 DIAGNOSIS — I10 ESSENTIAL (PRIMARY) HYPERTENSION: ICD-10-CM

## 2024-08-20 RX ORDER — LISINOPRIL 20 MG/1
20 TABLET ORAL
Qty: 90 TABLET | Refills: 0 | Status: SHIPPED | OUTPATIENT
Start: 2024-08-20

## 2024-08-20 RX ORDER — LISINOPRIL 20 MG/1
20 TABLET ORAL DAILY
Qty: 15 TABLET | Refills: 0 | Status: SHIPPED | OUTPATIENT
Start: 2024-08-20 | End: 2024-08-20 | Stop reason: SDUPTHER

## 2024-09-17 DIAGNOSIS — E03.9 ACQUIRED HYPOTHYROIDISM: ICD-10-CM

## 2024-09-18 DIAGNOSIS — E78.5 HYPERLIPIDEMIA, UNSPECIFIED HYPERLIPIDEMIA TYPE: ICD-10-CM

## 2024-09-18 RX ORDER — SIMVASTATIN 10 MG
10 TABLET ORAL
Qty: 90 TABLET | Refills: 3 | Status: SHIPPED | OUTPATIENT
Start: 2024-09-18

## 2024-09-18 RX ORDER — LEVOTHYROXINE SODIUM 50 UG/1
50 TABLET ORAL DAILY
Qty: 90 TABLET | Refills: 1 | Status: SHIPPED | OUTPATIENT
Start: 2024-09-18

## 2024-10-18 ENCOUNTER — TELEPHONE (OUTPATIENT)
Age: 69
End: 2024-10-18

## 2024-10-18 NOTE — TELEPHONE ENCOUNTER
Confirming Upcoming Procedure: colon/egd on 10/30/2024  Physician performing: Dr Montgomery  Location of procedure:  [facility]   Prep: miralax prep     Spoke with patient no question at the moment.

## 2024-10-19 ENCOUNTER — DOCUMENTATION (OUTPATIENT)
Dept: FAMILY MEDICINE CLINIC | Facility: CLINIC | Age: 69
End: 2024-10-19

## 2024-10-19 DIAGNOSIS — R09.81 SINUS CONGESTION: Primary | ICD-10-CM

## 2024-10-19 RX ORDER — METHYLPREDNISOLONE 4 MG/1
TABLET ORAL
Qty: 21 EACH | Refills: 0 | Status: SHIPPED | OUTPATIENT
Start: 2024-10-19

## 2024-10-19 RX ORDER — METHYLPREDNISOLONE 4 MG/1
TABLET ORAL
Qty: 21 EACH | Refills: 0 | Status: SHIPPED | OUTPATIENT
Start: 2024-10-19 | End: 2024-10-19 | Stop reason: SDUPTHER

## 2024-10-30 ENCOUNTER — HOSPITAL ENCOUNTER (OUTPATIENT)
Dept: GASTROENTEROLOGY | Facility: MEDICAL CENTER | Age: 69
Setting detail: OUTPATIENT SURGERY
Discharge: HOME | End: 2024-10-30
Payer: COMMERCIAL

## 2024-10-30 ENCOUNTER — ANESTHESIA (OUTPATIENT)
Dept: GASTROENTEROLOGY | Facility: MEDICAL CENTER | Age: 69
End: 2024-10-30
Payer: COMMERCIAL

## 2024-10-30 ENCOUNTER — ANESTHESIA EVENT (OUTPATIENT)
Dept: GASTROENTEROLOGY | Facility: MEDICAL CENTER | Age: 69
End: 2024-10-30
Payer: COMMERCIAL

## 2024-10-30 VITALS
OXYGEN SATURATION: 100 % | SYSTOLIC BLOOD PRESSURE: 102 MMHG | HEIGHT: 69 IN | BODY MASS INDEX: 30.51 KG/M2 | RESPIRATION RATE: 20 BRPM | HEART RATE: 66 BPM | WEIGHT: 206 LBS | TEMPERATURE: 97.4 F | DIASTOLIC BLOOD PRESSURE: 56 MMHG

## 2024-10-30 DIAGNOSIS — Z80.0 FH: COLON CANCER: ICD-10-CM

## 2024-10-30 DIAGNOSIS — K21.00 GASTROESOPHAGEAL REFLUX DISEASE WITH ESOPHAGITIS, UNSPECIFIED WHETHER HEMORRHAGE: ICD-10-CM

## 2024-10-30 PROCEDURE — 45385 COLONOSCOPY W/LESION REMOVAL: CPT | Performed by: INTERNAL MEDICINE

## 2024-10-30 PROCEDURE — 88305 TISSUE EXAM BY PATHOLOGIST: CPT | Performed by: PATHOLOGY

## 2024-10-30 PROCEDURE — 43251 EGD REMOVE LESION SNARE: CPT | Performed by: INTERNAL MEDICINE

## 2024-10-30 RX ORDER — LIDOCAINE HYDROCHLORIDE 20 MG/ML
INJECTION, SOLUTION EPIDURAL; INFILTRATION; INTRACAUDAL; PERINEURAL AS NEEDED
Status: DISCONTINUED | OUTPATIENT
Start: 2024-10-30 | End: 2024-10-30

## 2024-10-30 RX ORDER — SODIUM CHLORIDE 9 MG/ML
125 INJECTION, SOLUTION INTRAVENOUS CONTINUOUS
Status: DISCONTINUED | OUTPATIENT
Start: 2024-10-30 | End: 2024-11-03 | Stop reason: HOSPADM

## 2024-10-30 RX ORDER — ESOMEPRAZOLE MAGNESIUM 40 MG/1
40 CAPSULE, DELAYED RELEASE ORAL DAILY
Qty: 90 CAPSULE | Refills: 1 | Status: SHIPPED | OUTPATIENT
Start: 2024-10-30

## 2024-10-30 RX ORDER — PROPOFOL 10 MG/ML
INJECTION, EMULSION INTRAVENOUS AS NEEDED
Status: DISCONTINUED | OUTPATIENT
Start: 2024-10-30 | End: 2024-10-30

## 2024-10-30 RX ADMIN — PROPOFOL 50 MG: 10 INJECTION, EMULSION INTRAVENOUS at 07:35

## 2024-10-30 RX ADMIN — LIDOCAINE HYDROCHLORIDE 100 MG: 20 INJECTION, SOLUTION EPIDURAL; INFILTRATION; INTRACAUDAL at 07:30

## 2024-10-30 RX ADMIN — PROPOFOL 100 MCG/KG/MIN: 10 INJECTION, EMULSION INTRAVENOUS at 07:44

## 2024-10-30 RX ADMIN — PROPOFOL 50 MG: 10 INJECTION, EMULSION INTRAVENOUS at 07:32

## 2024-10-30 RX ADMIN — SODIUM CHLORIDE 125 ML/HR: 0.9 INJECTION, SOLUTION INTRAVENOUS at 07:23

## 2024-10-30 RX ADMIN — PROPOFOL 30 MG: 10 INJECTION, EMULSION INTRAVENOUS at 08:06

## 2024-10-30 RX ADMIN — PROPOFOL 100 MG: 10 INJECTION, EMULSION INTRAVENOUS at 07:30

## 2024-10-30 RX ADMIN — PROPOFOL 50 MG: 10 INJECTION, EMULSION INTRAVENOUS at 07:38

## 2024-10-30 NOTE — ANESTHESIA POSTPROCEDURE EVALUATION
Post-Op Assessment Note    CV Status:  Stable  Pain Score: 0    Pain management: adequate       Mental Status:  Alert and awake   Hydration Status:  Euvolemic   PONV Controlled:  Controlled   Airway Patency:  Patent     Post Op Vitals Reviewed: Yes    No anethesia notable event occurred.    Staff: CRNA   Comments: pt coughing during case, lungs diminished. pt confirms current uri upon emerging from anesthesia        Last Filed PACU Vitals:  Vitals Value Taken Time   Temp     Pulse 66    /58    Resp 18    SpO2 100 ra        Modified Antwan:  Activity: 2 (10/30/2024  7:05 AM)  Respiration: 2 (10/30/2024  7:05 AM)  Consciousness: 2 (10/30/2024  7:05 AM)  Oxygen Saturation: 2 (10/30/2024  7:05 AM)

## 2024-10-30 NOTE — H&P
"History and Physical -  [facility]  Mariel Hines 69 y.o. female MRN: [id number]                  HPI: Mariel Hines is a 69 y.o. year old female who presents for Esophagitis and FH of colon cancer.      REVIEW OF SYSTEMS: Per the HPI, and otherwise unremarkable.    Historical Information   Past Medical History:   Diagnosis Date    Allergic rhinitis     Disease of thyroid gland     GERD (gastroesophageal reflux disease)     Glaucoma     Hypercholesteremia     Hypertension     Hypertension      Past Surgical History:   Procedure Laterality Date    HYSTERECTOMY      OOPHORECTOMY      TONSILLECTOMY  childhood     Social History   Social History     Substance and Sexual Activity   Alcohol Use Not Currently    Comment: social      Social History     Substance and Sexual Activity   Drug Use Never     Social History     Tobacco Use   Smoking Status Former    Current packs/day: 0.00    Average packs/day: 0.5 packs/day for 41.0 years (20.5 ttl pk-yrs)    Types: Cigarettes    Start date: 1973    Quit date: 1/1/2014    Years since quitting: 10.8   Smokeless Tobacco Never     Family History   Problem Relation Age of Onset    Cancer Father     Colon cancer Father     Skin cancer Father     Diabetes Mother     Heart attack Mother     Heart disease Mother     Hypertension Mother     Diabetes type II Mother     Hypothyroidism Mother     Hypertension Sister     Hypothyroidism Sister     Prostate cancer Brother        Meds/Allergies     Not in a hospital admission.    Allergies   Allergen Reactions    Doxycycline GI Intolerance    Erythromycin GI Intolerance     Sensitivity to medication    sensitivity  Sensitivity to medication  sensitivity      Norvasc [Amlodipine] Swelling       Objective     Blood pressure 166/77, pulse 80, temperature (!) 97.4 °F (36.3 °C), temperature source Temporal, resp. rate 18, height 5' 9\" (1.753 m), weight 93.4 kg (206 lb), SpO2 98%.      PHYSICAL EXAMINATION:    General " Appearance:   Alert, cooperative, no distress   HEENT:  Normocephalic, atraumatic, anicteric. Neck supple, symmetrical, trachea midline.   Lungs:   Equal chest rise and unlabored breathing, normal effort, no coughing.   Cardiovascular:   No visualized JVD.   Abdomen:   No abdominal distension.   Skin:   No jaundice, rashes, or lesions.    Musculoskeletal:   Normal range of motion visualized.   Psych:  Normal affect and normal insight.   Neuro:  Alert and appropriate.           ASSESSMENT/PLAN:  This is a 69 y.o. year old female here for EGD and colonoscopy, and she is stable and optimized for her procedure.

## 2024-10-30 NOTE — ANESTHESIA POSTPROCEDURE EVALUATION
Post-Op Assessment Note    CV Status:  Stable    Pain management: adequate       Mental Status:  Alert and awake   Hydration Status:  Euvolemic   PONV Controlled:  Controlled   Airway Patency:  Patent     Post Op Vitals Reviewed: Yes    No anethesia notable event occurred.    Staff: Anesthesiologist, CRNA           Last Filed PACU Vitals:  Vitals Value Taken Time   Temp     Pulse 66 10/30/24 0827   /56 10/30/24 0827   Resp 20 10/30/24 0827   SpO2 100 % 10/30/24 0827       Modified Antwan:  Activity: 2 (10/30/2024  8:33 AM)  Respiration: 2 (10/30/2024  8:33 AM)  Circulation: 2 (10/30/2024  8:33 AM)  Consciousness: 2 (10/30/2024  8:33 AM)  Oxygen Saturation: 2 (10/30/2024  8:33 AM)  Modified Antwan Score: 10 (10/30/2024  8:33 AM)

## 2024-10-30 NOTE — ANESTHESIA PREPROCEDURE EVALUATION
Procedure:  COLONOSCOPY  EGD    Relevant Problems   ANESTHESIA (within normal limits)      CARDIO   (+) Essential (primary) hypertension   (+) Hyperlipidemia   (+) Nonrheumatic mitral (valve) insufficiency      ENDO   (+) Hyperparathyroidism, unspecified (HCC)   (+) Hypothyroidism      GI/HEPATIC (within normal limits)      /RENAL   (+) Polycystic kidney, unspecified      GYN (within normal limits)      HEMATOLOGY (within normal limits)      MUSCULOSKELETAL (within normal limits)      NEURO/PSYCH (within normal limits)      PULMONARY (within normal limits)        Physical Exam    Airway    Mallampati score: I  TM Distance: >3 FB  Neck ROM: full     Dental   No notable dental hx     Cardiovascular  Rhythm: regular, Rate: normal, Cardiovascular exam normal    Pulmonary  Pulmonary exam normal Breath sounds clear to auscultation    Other Findings  post-pubertal.      Anesthesia Plan  ASA Score- 2     Anesthesia Type- IV sedation with anesthesia with ASA Monitors.         Additional Monitors:     Airway Plan:            Plan Factors-Exercise tolerance (METS): >4 METS.    Chart reviewed. EKG reviewed.  Existing labs reviewed. Patient summary reviewed.    Patient is not a current smoker.              Induction- intravenous.    Postoperative Plan-         Informed Consent- Anesthetic plan and risks discussed with patient.          
English

## 2024-11-04 PROCEDURE — 88305 TISSUE EXAM BY PATHOLOGIST: CPT | Performed by: PATHOLOGY

## 2024-11-05 ENCOUNTER — PREP FOR PROCEDURE (OUTPATIENT)
Age: 69
End: 2024-11-05

## 2024-11-05 DIAGNOSIS — K21.00 GASTROESOPHAGEAL REFLUX DISEASE WITH ESOPHAGITIS, UNSPECIFIED WHETHER HEMORRHAGE: Primary | ICD-10-CM

## 2024-11-15 ENCOUNTER — IMMUNIZATIONS (OUTPATIENT)
Dept: FAMILY MEDICINE CLINIC | Facility: CLINIC | Age: 69
End: 2024-11-15
Payer: COMMERCIAL

## 2024-11-15 DIAGNOSIS — Z23 ENCOUNTER FOR IMMUNIZATION: Primary | ICD-10-CM

## 2024-11-15 PROCEDURE — 90662 IIV NO PRSV INCREASED AG IM: CPT

## 2024-11-15 PROCEDURE — 90471 IMMUNIZATION ADMIN: CPT

## 2024-11-18 DIAGNOSIS — I10 ESSENTIAL (PRIMARY) HYPERTENSION: Primary | ICD-10-CM

## 2024-11-18 DIAGNOSIS — K21.00 GASTROESOPHAGEAL REFLUX DISEASE WITH ESOPHAGITIS, UNSPECIFIED WHETHER HEMORRHAGE: ICD-10-CM

## 2024-11-18 RX ORDER — HYDROCHLOROTHIAZIDE 25 MG/1
25 TABLET ORAL DAILY
Qty: 90 TABLET | Refills: 1 | Status: SHIPPED | OUTPATIENT
Start: 2024-11-18

## 2024-11-22 ENCOUNTER — RESULTS FOLLOW-UP (OUTPATIENT)
Age: 69
End: 2024-11-22

## 2024-11-29 ENCOUNTER — TELEPHONE (OUTPATIENT)
Dept: CARDIOLOGY CLINIC | Facility: CLINIC | Age: 69
End: 2024-11-29

## 2024-11-29 NOTE — TELEPHONE ENCOUNTER
Due to provider schedule change 1/31 called pt to r/s she is out of town and will call back to r/s

## 2025-01-07 ENCOUNTER — APPOINTMENT (OUTPATIENT)
Age: 70
End: 2025-01-07
Payer: COMMERCIAL

## 2025-01-07 ENCOUNTER — OFFICE VISIT (OUTPATIENT)
Age: 70
End: 2025-01-07
Payer: COMMERCIAL

## 2025-01-07 VITALS — WEIGHT: 212.2 LBS | BODY MASS INDEX: 31.43 KG/M2 | HEIGHT: 69 IN

## 2025-01-07 DIAGNOSIS — M25.561 RIGHT KNEE PAIN, UNSPECIFIED CHRONICITY: ICD-10-CM

## 2025-01-07 DIAGNOSIS — M17.12 PRIMARY OSTEOARTHRITIS OF LEFT KNEE: ICD-10-CM

## 2025-01-07 DIAGNOSIS — Z01.89 ENCOUNTER FOR LOWER EXTREMITY COMPARISON IMAGING STUDY: ICD-10-CM

## 2025-01-07 DIAGNOSIS — M17.11 PRIMARY OSTEOARTHRITIS OF RIGHT KNEE: Primary | ICD-10-CM

## 2025-01-07 PROCEDURE — 99204 OFFICE O/P NEW MOD 45 MIN: CPT | Performed by: STUDENT IN AN ORGANIZED HEALTH CARE EDUCATION/TRAINING PROGRAM

## 2025-01-07 PROCEDURE — 73564 X-RAY EXAM KNEE 4 OR MORE: CPT

## 2025-01-07 PROCEDURE — 77073 BONE LENGTH STUDIES: CPT

## 2025-01-07 PROCEDURE — 73562 X-RAY EXAM OF KNEE 3: CPT

## 2025-01-07 PROCEDURE — 20610 DRAIN/INJ JOINT/BURSA W/O US: CPT | Performed by: STUDENT IN AN ORGANIZED HEALTH CARE EDUCATION/TRAINING PROGRAM

## 2025-01-07 RX ORDER — ROSUVASTATIN CALCIUM 20 MG/1
20 TABLET, COATED ORAL DAILY
COMMUNITY

## 2025-01-07 RX ADMIN — TRIAMCINOLONE ACETONIDE 40 MG: 40 INJECTION, SUSPENSION INTRA-ARTICULAR; INTRAMUSCULAR at 16:00

## 2025-01-07 RX ADMIN — LIDOCAINE HYDROCHLORIDE 4 ML: 10 INJECTION, SOLUTION INFILTRATION; PERINEURAL at 16:00

## 2025-01-07 NOTE — PROGRESS NOTES
Knee New Office Note    Assessment:     1. Primary osteoarthritis of right knee    2. Right knee pain, unspecified chronicity    3. Encounter for lower extremity comparison imaging study    4. 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 She has been managing this with non-operative modalities with great success and would like to continue non-operative interventions in the form of CSI. After risks and benefits of injection were discussed the patient opted to receive a right knee CSI and tolerated this well. She was counseled to avoid vaccines over the next two weeks and ice her knee over the next 24 hours for soreness. She can follow up in three months for repeat injection.     Large joint arthrocentesis: R knee  [place] Protocol:  procedure performed by consultantConsent: Verbal consent obtained.  Consent given by: patient  Patient understanding: patient states understanding of the procedure being performed  Patient identity confirmed: verbally with patient  Supporting Documentation  Indications: pain   Procedure Details  Location: knee - R knee  Needle size: 22 G  Ultrasound guidance: no  Medications administered: 4 mL lidocaine 1 %; 40 mg triamcinolone acetonide 40 mg/mL    Patient tolerance: patient tolerated the procedure well with no immediate complications  Dressing:  Sterile dressing applied            Subjective:     Patient ID: Mariel MAYA  Flora is a 69 y.o. female.  Chief Complaint:  HPI:  69 y.o. female Presents to the clinic today for initial evaluation of right knee pain that is atraumatic in nature. She has been seeing Dr. Persaud at [facility] for a few years and he has been giving her CSI to the right knee every three months with great success. She would like to continue this and would like to establish care with [facility] due to insurance reasons. She reports the pain is moderate and constant after the injection wears off at the three month edvin.       Allergy:  Allergies   Allergen Reactions    Doxycycline GI Intolerance    Erythromycin GI Intolerance     Sensitivity to medication    sensitivity  Sensitivity to medication  sensitivity      Norvasc [Amlodipine] Swelling     Medications:  all current active meds have been reviewed  Past Medical History:  Past Medical History:   Diagnosis Date    Allergic rhinitis     Disease of thyroid gland     GERD (gastroesophageal reflux disease)     Glaucoma     Hypercholesteremia     Hypertension     Hypertension      Past Surgical History:  Past Surgical History:   Procedure Laterality Date    HYSTERECTOMY      OOPHORECTOMY      TONSILLECTOMY  childhood     Family History:  Family History   Problem Relation Age of Onset    Cancer Father     Colon cancer Father     Skin cancer Father     Diabetes Mother     Heart attack Mother     Heart disease Mother     Hypertension Mother     Diabetes type II Mother     Hypothyroidism Mother     Hypertension Sister     Hypothyroidism Sister     Prostate cancer Brother      Social History:  Social History     Substance and Sexual Activity   Alcohol Use Not Currently    Comment: social      Social History     Substance and Sexual Activity   Drug Use Never     Social History     Tobacco Use   Smoking Status Former    Current packs/day: 0.00    Average packs/day: 0.5 packs/day for 41.0 years (20.5 ttl pk-yrs)    Types: Cigarettes    Start date: 1973    Quit date: 1/1/2014     "Years since quittin.0   Smokeless Tobacco Never           ROS:  General: Per HPI  Skin: Negative, except if noted below  HEENT: Negative  Respiratory: Negative  Cardiovascular: Negative  Gastrointestinal: Negative  Urinary: Negative  Vascular: Negative  Musculoskeletal: Positive per HPI   Neurologic: Positive per HPI  Endocrine: Negative    Objective:  BP Readings from Last 1 Encounters:   10/30/24 102/56      Wt Readings from Last 1 Encounters:   25 96.3 kg (212 lb 3.2 oz)        Respiratory:   non-labored respirations    Lymphatics:  no palpable lymph nodes    Gait:   Normal     Neurologic:   Alert and oriented times 3  Patient with normal sensation except as noted below  Deep tendon reflexes 2+ except as noted in MSK exam    Bilateral Lower Extremity:  Right Knee:      Inspection:  intact    Overall limb alignment valgus    Effusion: none    ROM full with pain    Extensor Lag: none    Palpation: Lateral Joint line tenderness to palpation    AP Stability at 90 deg stable    M/L stability in full extension stable    M/L stability in midflexion stable    Motor: 5/5 Q/HS/TA/GS/P    Pulses: 2+ DP / 2+ PT    SILT DP/SP/S/S/TN        Imaging:  My interpretation XR AP scanogram/AP bilateral knee/lateral/butler/sunrise right knee: severe joint space narrowing, subchondral sclerosis, subchondral cysts, osteophyte formation. No fracture or dislocation.     BMI:   Estimated body mass index is 31.34 kg/m² as calculated from the following:    Height as of this encounter: 5' 9\" (1.753 m).    Weight as of this encounter: 96.3 kg (212 lb 3.2 oz).  BSA:   Estimated body surface area is 2.12 meters squared as calculated from the following:    Height as of this encounter: 5' 9\" (1.753 m).    Weight as of this encounter: 96.3 kg (212 lb 3.2 oz).           Scribe Attestation      I,:   am acting as a scribe while in the presence of the attending physician.:       I,:   personally performed the services described in this " documentation    as scribed in my presence.:

## 2025-01-10 RX ORDER — TRIAMCINOLONE ACETONIDE 40 MG/ML
40 INJECTION, SUSPENSION INTRA-ARTICULAR; INTRAMUSCULAR
Status: COMPLETED | OUTPATIENT
Start: 2025-01-07 | End: 2025-01-07

## 2025-01-10 RX ORDER — LIDOCAINE HYDROCHLORIDE 10 MG/ML
4 INJECTION, SOLUTION INFILTRATION; PERINEURAL
Status: COMPLETED | OUTPATIENT
Start: 2025-01-07 | End: 2025-01-07

## 2025-02-04 DIAGNOSIS — K21.00 GASTROESOPHAGEAL REFLUX DISEASE WITH ESOPHAGITIS, UNSPECIFIED WHETHER HEMORRHAGE: ICD-10-CM

## 2025-03-25 ENCOUNTER — TELEPHONE (OUTPATIENT)
Dept: FAMILY MEDICINE CLINIC | Facility: CLINIC | Age: 70
End: 2025-03-25

## 2025-03-25 DIAGNOSIS — E03.9 ACQUIRED HYPOTHYROIDISM: ICD-10-CM

## 2025-03-26 RX ORDER — LEVOTHYROXINE SODIUM 50 UG/1
50 TABLET ORAL DAILY
Qty: 90 TABLET | Refills: 0 | Status: SHIPPED | OUTPATIENT
Start: 2025-03-26

## 2025-03-26 NOTE — TELEPHONE ENCOUNTER
Pt would like labs ordered for her to get bloodwork done prior to her appointment. Pt requested CBC with Diff, CMP, Lipid Profile, TSH, A1C

## 2025-03-27 DIAGNOSIS — E83.52 HYPERCALCEMIA: ICD-10-CM

## 2025-03-27 DIAGNOSIS — E78.2 MIXED HYPERLIPIDEMIA: ICD-10-CM

## 2025-03-27 DIAGNOSIS — E03.9 ACQUIRED HYPOTHYROIDISM: ICD-10-CM

## 2025-03-27 DIAGNOSIS — E21.3 HYPERPARATHYROIDISM, UNSPECIFIED (HCC): ICD-10-CM

## 2025-03-27 DIAGNOSIS — E03.9 HYPOTHYROIDISM, UNSPECIFIED TYPE: ICD-10-CM

## 2025-03-27 DIAGNOSIS — I10 ESSENTIAL (PRIMARY) HYPERTENSION: Primary | ICD-10-CM

## 2025-03-27 DIAGNOSIS — Z13.1 SCREENING FOR DIABETES MELLITUS: ICD-10-CM

## 2025-03-27 DIAGNOSIS — Z13.0 SCREENING, ANEMIA, DEFICIENCY, IRON: ICD-10-CM

## 2025-04-24 ENCOUNTER — APPOINTMENT (OUTPATIENT)
Dept: LAB | Facility: CLINIC | Age: 70
End: 2025-04-24
Payer: COMMERCIAL

## 2025-04-24 ENCOUNTER — RESULTS FOLLOW-UP (OUTPATIENT)
Dept: FAMILY MEDICINE CLINIC | Facility: CLINIC | Age: 70
End: 2025-04-24

## 2025-04-24 DIAGNOSIS — E83.52 HYPERCALCEMIA: ICD-10-CM

## 2025-04-24 DIAGNOSIS — I10 ESSENTIAL (PRIMARY) HYPERTENSION: ICD-10-CM

## 2025-04-24 DIAGNOSIS — E03.9 ACQUIRED HYPOTHYROIDISM: ICD-10-CM

## 2025-04-24 DIAGNOSIS — Z13.1 SCREENING FOR DIABETES MELLITUS: ICD-10-CM

## 2025-04-24 DIAGNOSIS — E78.2 MIXED HYPERLIPIDEMIA: ICD-10-CM

## 2025-04-24 DIAGNOSIS — Z13.0 SCREENING, ANEMIA, DEFICIENCY, IRON: ICD-10-CM

## 2025-04-24 DIAGNOSIS — E21.3 HYPERPARATHYROIDISM, UNSPECIFIED (HCC): ICD-10-CM

## 2025-04-24 LAB
ALBUMIN SERPL BCG-MCNC: 4.4 G/DL (ref 3.5–5)
ALP SERPL-CCNC: 68 U/L (ref 34–104)
ALT SERPL W P-5'-P-CCNC: 19 U/L (ref 7–52)
ANION GAP SERPL CALCULATED.3IONS-SCNC: 10 MMOL/L (ref 4–13)
AST SERPL W P-5'-P-CCNC: 19 U/L (ref 13–39)
BASOPHILS # BLD AUTO: 0.05 THOUSANDS/ÂΜL (ref 0–0.1)
BASOPHILS NFR BLD AUTO: 1 % (ref 0–1)
BILIRUB SERPL-MCNC: 0.41 MG/DL (ref 0.2–1)
BUN SERPL-MCNC: 20 MG/DL (ref 5–25)
CALCIUM SERPL-MCNC: 10.6 MG/DL (ref 8.4–10.2)
CHLORIDE SERPL-SCNC: 101 MMOL/L (ref 96–108)
CHOLEST SERPL-MCNC: 128 MG/DL (ref ?–200)
CO2 SERPL-SCNC: 29 MMOL/L (ref 21–32)
CREAT SERPL-MCNC: 0.75 MG/DL (ref 0.6–1.3)
EOSINOPHIL # BLD AUTO: 0.48 THOUSAND/ÂΜL (ref 0–0.61)
EOSINOPHIL NFR BLD AUTO: 8 % (ref 0–6)
ERYTHROCYTE [DISTWIDTH] IN BLOOD BY AUTOMATED COUNT: 12.2 % (ref 11.6–15.1)
EST. AVERAGE GLUCOSE BLD GHB EST-MCNC: 117 MG/DL
GFR SERPL CREATININE-BSD FRML MDRD: 79 ML/MIN/1.73SQ M
GLUCOSE P FAST SERPL-MCNC: 85 MG/DL (ref 65–99)
HBA1C MFR BLD: 5.7 %
HCT VFR BLD AUTO: 41.6 % (ref 34.8–46.1)
HDLC SERPL-MCNC: 47 MG/DL
HGB BLD-MCNC: 13.6 G/DL (ref 11.5–15.4)
IMM GRANULOCYTES # BLD AUTO: 0.02 THOUSAND/UL (ref 0–0.2)
IMM GRANULOCYTES NFR BLD AUTO: 0 % (ref 0–2)
LDLC SERPL CALC-MCNC: 46 MG/DL (ref 0–100)
LYMPHOCYTES # BLD AUTO: 1.4 THOUSANDS/ÂΜL (ref 0.6–4.47)
LYMPHOCYTES NFR BLD AUTO: 24 % (ref 14–44)
MCH RBC QN AUTO: 30 PG (ref 26.8–34.3)
MCHC RBC AUTO-ENTMCNC: 32.7 G/DL (ref 31.4–37.4)
MCV RBC AUTO: 92 FL (ref 82–98)
MONOCYTES # BLD AUTO: 0.59 THOUSAND/ÂΜL (ref 0.17–1.22)
MONOCYTES NFR BLD AUTO: 10 % (ref 4–12)
NEUTROPHILS # BLD AUTO: 3.33 THOUSANDS/ÂΜL (ref 1.85–7.62)
NEUTS SEG NFR BLD AUTO: 57 % (ref 43–75)
NONHDLC SERPL-MCNC: 81 MG/DL
NRBC BLD AUTO-RTO: 0 /100 WBCS
PLATELET # BLD AUTO: 215 THOUSANDS/UL (ref 149–390)
PMV BLD AUTO: 10.4 FL (ref 8.9–12.7)
POTASSIUM SERPL-SCNC: 4.2 MMOL/L (ref 3.5–5.3)
PROT SERPL-MCNC: 7.1 G/DL (ref 6.4–8.4)
RBC # BLD AUTO: 4.53 MILLION/UL (ref 3.81–5.12)
SODIUM SERPL-SCNC: 140 MMOL/L (ref 135–147)
TRIGL SERPL-MCNC: 176 MG/DL (ref ?–150)
TSH SERPL DL<=0.05 MIU/L-ACNC: 2.9 UIU/ML (ref 0.45–4.5)
WBC # BLD AUTO: 5.87 THOUSAND/UL (ref 4.31–10.16)

## 2025-04-24 PROCEDURE — 85025 COMPLETE CBC W/AUTO DIFF WBC: CPT

## 2025-04-24 PROCEDURE — 83036 HEMOGLOBIN GLYCOSYLATED A1C: CPT

## 2025-04-24 PROCEDURE — 84443 ASSAY THYROID STIM HORMONE: CPT

## 2025-04-24 PROCEDURE — 80061 LIPID PANEL: CPT

## 2025-04-24 PROCEDURE — 36415 COLL VENOUS BLD VENIPUNCTURE: CPT

## 2025-04-24 PROCEDURE — 80053 COMPREHEN METABOLIC PANEL: CPT

## 2025-04-25 ENCOUNTER — OFFICE VISIT (OUTPATIENT)
Dept: FAMILY MEDICINE CLINIC | Facility: CLINIC | Age: 70
End: 2025-04-25
Payer: COMMERCIAL

## 2025-04-25 VITALS
SYSTOLIC BLOOD PRESSURE: 122 MMHG | BODY MASS INDEX: 35.12 KG/M2 | DIASTOLIC BLOOD PRESSURE: 78 MMHG | TEMPERATURE: 98.2 F | OXYGEN SATURATION: 95 % | WEIGHT: 210.8 LBS | HEIGHT: 65 IN | HEART RATE: 74 BPM

## 2025-04-25 DIAGNOSIS — Z00.00 ANNUAL PHYSICAL EXAM: Primary | ICD-10-CM

## 2025-04-25 DIAGNOSIS — Q61.3 POLYCYSTIC KIDNEY, UNSPECIFIED: ICD-10-CM

## 2025-04-25 DIAGNOSIS — R09.81 NASAL CONGESTION: ICD-10-CM

## 2025-04-25 DIAGNOSIS — Z87.891 HISTORY OF TOBACCO ABUSE: ICD-10-CM

## 2025-04-25 DIAGNOSIS — E78.2 MIXED HYPERLIPIDEMIA: ICD-10-CM

## 2025-04-25 DIAGNOSIS — E03.9 ACQUIRED HYPOTHYROIDISM: ICD-10-CM

## 2025-04-25 DIAGNOSIS — I10 ESSENTIAL (PRIMARY) HYPERTENSION: ICD-10-CM

## 2025-04-25 DIAGNOSIS — K21.00 GASTROESOPHAGEAL REFLUX DISEASE WITH ESOPHAGITIS, UNSPECIFIED WHETHER HEMORRHAGE: ICD-10-CM

## 2025-04-25 DIAGNOSIS — F41.9 ANXIETY: ICD-10-CM

## 2025-04-25 PROCEDURE — 99397 PER PM REEVAL EST PAT 65+ YR: CPT | Performed by: NURSE PRACTITIONER

## 2025-04-25 PROCEDURE — 99214 OFFICE O/P EST MOD 30 MIN: CPT | Performed by: NURSE PRACTITIONER

## 2025-04-25 RX ORDER — ALPRAZOLAM 0.25 MG
0.25 TABLET ORAL AS NEEDED
Qty: 30 TABLET | Refills: 0 | Status: SHIPPED | OUTPATIENT
Start: 2025-04-25

## 2025-05-01 DIAGNOSIS — K21.00 GASTROESOPHAGEAL REFLUX DISEASE WITH ESOPHAGITIS, UNSPECIFIED WHETHER HEMORRHAGE: ICD-10-CM

## 2025-05-03 NOTE — ASSESSMENT & PLAN NOTE
BP appears well-controlled today: 122/78  Denies any cardiovascular symptoms  Advised to continue periodic home pressure checks  We will continue current treatment: Lisinopril; HCTZ  We will continue routine lab work monitoring    Orders:    Comprehensive metabolic panel; Future

## 2025-05-03 NOTE — ASSESSMENT & PLAN NOTE
Cholesterol well-controlled  Total 128;   Larisa mildly elevated at 176  Discussed healthy diet: Reducing fats continue rosuvastatin 20 daily tolerating well without side effect repeat lipid panel 1 year

## 2025-05-03 NOTE — ASSESSMENT & PLAN NOTE
Multi pack history  Continues to decline CT scan  But feels well from a respiratory standpoint-with no concerns today

## 2025-05-03 NOTE — PROGRESS NOTES
Adult Annual Physical  Name: Marile Hines      : 1955      MRN: [id number]  Encounter Provider: MILLY Piper  Encounter Date: 2025   Encounter department: [facility]    :  Assessment & Plan  Essential (primary) hypertension  BP appears well-controlled today: 122/78  Denies any cardiovascular symptoms  Advised to continue periodic home pressure checks  We will continue current treatment: Lisinopril; HCTZ  We will continue routine lab work monitoring    Orders:    Comprehensive metabolic panel; Future    Acquired hypothyroidism  TSH stable: 2.9  Continue levothyroxine 50 daily       Mixed hyperlipidemia  Cholesterol well-controlled  Total 128;   Larisa mildly elevated at 176  Discussed healthy diet: Reducing fats continue rosuvastatin 20 daily tolerating well without side effect repeat lipid panel 1 year       History of tobacco abuse  Multi pack history  Continues to decline CT scan  But feels well from a respiratory standpoint-with no concerns today       Polycystic kidney, unspecified  CT scan remains in system  Patient will consider completing       Anxiety  Situational anxiety  Very infrequent use of alprazolam  Rx refilled today-PM ED without red flags    Orders:    ALPRAZolam (XANAX) 0.25 mg tablet; Take 1 tablet (0.25 mg total) by mouth as needed for sleep    Annual physical exam         Nasal congestion  Follows with ENT  Daily Singulair       Gastroesophageal reflux disease with esophagitis, unspecified whether hemorrhage  Well-controlled  Continue healthy diet  Daily Nexium           Preventive Screenings:  - Diabetes Screening: screening up-to-date  - Cholesterol Screening: screening not indicated and has hyperlipidemia   - Hepatitis C screening: screening up-to-date   - HIV screening: screening not indicated   - Cervical cancer screening: screening not indicated   - Breast cancer screening: screening up-to-date and orders placed   - Colon  cancer screening: screening up-to-date   - Lung cancer screening: orders placed   - Osteoporosis screening: orders placed     Recommended preventative care screenings ordered  Colonoscopy due 2029  Lengthy conversation with patient today  She has significant healthcare anxiety  She would prefer to hold off on some screenings-education has been provided  She is aware of potential associated risks by not completing as recommended  I will continue to support and encourage completion of testing  But I will also respect her decision to choose     Counseling/Anticipatory Guidance:  - Alcohol: discussed moderation in alcohol intake and recommendations for healthy alcohol use.   - Drug use: discussed harms of illicit drug use and how it can negatively impact mental/physical health.   - Tobacco use: discussed harms of tobacco use and management options for quitting.   - Dental health: discussed importance of regular tooth brushing, flossing, and dental visits.   - Sexual health: discussed sexually transmitted diseases, partner selection, use of condoms, avoidance of unintended pregnancy, and contraceptive alternatives.   - Diet: discussed recommendations for a healthy/well-balanced diet.   - Exercise: the importance of regular exercise/physical activity was discussed. Recommend exercise 3-5 times per week for at least 30 minutes.   - Injury prevention: discussed safety/seat belts, safety helmets, smoke detectors, carbon monoxide detectors, and smoking near bedding or upholstery.     Immunizations reviewed       Depression Screening and Follow-up Plan: Patient was screened for depression during today's encounter. They screened negative with a PHQ-2 score of 0.      Lung Cancer Screening Shared Decision Making: I discussed with her that she is a candidate for lung cancer CT screening.     The following Shared Decision-Making points were covered:  Benefits of screening were discussed, including the rates of reduction in death  from lung cancer and other causes.  Harms of screening were reviewed, including false positive tests, radiation exposure levels, risks of invasive procedures, risks of complications of screening, and risk of overdiagnosis.  I counseled on the importance of adherence to annual lung cancer LDCT screening, impact of co-morbidities, and ability or willingness to undergo diagnosis and treatment.  I counseled on the importance of maintaining abstinence as a former smoker or was counseled on the importance of smoking cessation if a current smoker    Review of Eligibility Criteria: She meets all of the criteria for Lung Cancer Screening.   - She is 70 y.o.   - She has 20 pack year tobacco history and is a current smoker or has quit within the past 15 years  - She presents no signs or symptoms of lung cancer    After discussion, the patient decided to elect lung cancer screening.        History of Present Illness     Adult Annual Physical:  Patient presents for annual physical. Pleasant 70-year-old female presents today for her annual wellness exam and chronic conditions check  Age-appropriate preventative care/recommended screenings reviewed and ordered as appropriate  Patient is an NP in our network-specialty mental health.     Diet and Physical Activity:  - Diet/Nutrition: well balanced diet. Encourage Mediterranean style diet  - Exercise: walking. Encourage 20/30 minutes of cardiovascular and/or strengthening exercise daily    Depression Screening:  - PHQ-2 Score: 0    General Health:  - Sleep: sleeps well.  - Hearing: normal hearing bilateral ears.  - Vision: no vision problems and most recent eye exam < 1 year ago.    /GYN Health:  - Follows with GYN: no.   - Menopause: postmenopausal.   - History of STDs: no  - Contraception:. Pap not indicated; mammogram due-ordered; DEXA normal in 2022-still do encourage calcium/vitamin D daily      Advanced Care Planning:  - Has an advanced directive?: no    - Has a durable  "medical POA?: no    - ACP document given to patient?: no      Review of Systems   Constitutional: Negative.    HENT: Negative.     Eyes: Negative.    Respiratory: Negative.     Cardiovascular: Negative.    Gastrointestinal: Negative.    Genitourinary: Negative.    Musculoskeletal: Negative.    Skin: Negative.    Neurological: Negative.    Psychiatric/Behavioral: Negative.           Objective   /78 (BP Location: Left arm, Patient Position: Sitting, Cuff Size: Large)   Pulse 74   Temp 98.2 °F (36.8 °C) (Temporal)   Ht 5' 5\" (1.651 m)   Wt 95.6 kg (210 lb 12.8 oz)   SpO2 95%   BMI 35.08 kg/m²     Physical Exam  Vitals and nursing note reviewed.   Constitutional:       General: She is not in acute distress.     Appearance: Normal appearance. She is well-developed and well-groomed. She is not ill-appearing.   HENT:      Head: Normocephalic.      Right Ear: Tympanic membrane, ear canal and external ear normal.      Left Ear: Tympanic membrane, ear canal and external ear normal.      Nose: Nose normal.      Mouth/Throat:      Mouth: Mucous membranes are moist.      Pharynx: Oropharynx is clear.   Eyes:      Conjunctiva/sclera: Conjunctivae normal.      Pupils: Pupils are equal, round, and reactive to light.   Neck:      Thyroid: No thyromegaly.      Vascular: No carotid bruit.   Cardiovascular:      Rate and Rhythm: Normal rate and regular rhythm.      Pulses:           Posterior tibial pulses are 2+ on the right side and 2+ on the left side.      Heart sounds: Normal heart sounds.   Pulmonary:      Effort: Pulmonary effort is normal. No respiratory distress.      Breath sounds: Normal breath sounds and air entry.   Musculoskeletal:      Right lower leg: No edema.      Left lower leg: No edema.   Lymphadenopathy:      Cervical: No cervical adenopathy.   Skin:     General: Skin is warm and dry.   Neurological:      General: No focal deficit present.      Mental Status: She is alert and oriented to person, place, " and time.   Psychiatric:         Attention and Perception: Attention normal.         Mood and Affect: Mood normal.         Behavior: Behavior normal.         Thought Content: Thought content normal.         Judgment: Judgment normal.

## 2025-05-28 DIAGNOSIS — E03.9 ACQUIRED HYPOTHYROIDISM: ICD-10-CM

## 2025-05-28 RX ORDER — LISINOPRIL 5 MG/1
15 TABLET ORAL DAILY
Qty: 270 TABLET | Refills: 3 | Status: SHIPPED | OUTPATIENT
Start: 2025-05-28 | End: 2025-05-29 | Stop reason: SDUPTHER

## 2025-05-29 DIAGNOSIS — E03.9 ACQUIRED HYPOTHYROIDISM: ICD-10-CM

## 2025-05-29 RX ORDER — LISINOPRIL 5 MG/1
15 TABLET ORAL DAILY
Qty: 270 TABLET | Refills: 3 | Status: SHIPPED | OUTPATIENT
Start: 2025-05-29

## 2025-06-26 DIAGNOSIS — E03.9 ACQUIRED HYPOTHYROIDISM: ICD-10-CM

## 2025-06-27 RX ORDER — LEVOTHYROXINE SODIUM 50 UG/1
50 TABLET ORAL DAILY
Qty: 90 TABLET | Refills: 1 | Status: SHIPPED | OUTPATIENT
Start: 2025-06-27

## 2025-07-30 DIAGNOSIS — Z12.31 ENCOUNTER FOR SCREENING MAMMOGRAM FOR BREAST CANCER: Primary | ICD-10-CM
